# Patient Record
Sex: MALE | Race: BLACK OR AFRICAN AMERICAN | Employment: OTHER | URBAN - METROPOLITAN AREA
[De-identification: names, ages, dates, MRNs, and addresses within clinical notes are randomized per-mention and may not be internally consistent; named-entity substitution may affect disease eponyms.]

---

## 2017-01-10 ENCOUNTER — OFFICE VISIT (OUTPATIENT)
Dept: INTERNAL MEDICINE CLINIC | Age: 67
End: 2017-01-10

## 2017-01-10 VITALS
WEIGHT: 246.2 LBS | HEART RATE: 58 BPM | OXYGEN SATURATION: 98 % | BODY MASS INDEX: 33.39 KG/M2 | SYSTOLIC BLOOD PRESSURE: 112 MMHG | DIASTOLIC BLOOD PRESSURE: 86 MMHG

## 2017-01-10 DIAGNOSIS — Z23 NEED FOR DIPHTHERIA-TETANUS-PERTUSSIS (TDAP) VACCINE, ADULT/ADOLESCENT: ICD-10-CM

## 2017-01-10 DIAGNOSIS — I10 ESSENTIAL HYPERTENSION: Primary | ICD-10-CM

## 2017-01-10 DIAGNOSIS — E78.00 PURE HYPERCHOLESTEROLEMIA: ICD-10-CM

## 2017-01-10 PROCEDURE — G8484 FLU IMMUNIZE NO ADMIN: HCPCS | Performed by: INTERNAL MEDICINE

## 2017-01-10 PROCEDURE — G8427 DOCREV CUR MEDS BY ELIG CLIN: HCPCS | Performed by: INTERNAL MEDICINE

## 2017-01-10 PROCEDURE — 1036F TOBACCO NON-USER: CPT | Performed by: INTERNAL MEDICINE

## 2017-01-10 PROCEDURE — G8419 CALC BMI OUT NRM PARAM NOF/U: HCPCS | Performed by: INTERNAL MEDICINE

## 2017-01-10 PROCEDURE — 4040F PNEUMOC VAC/ADMIN/RCVD: CPT | Performed by: INTERNAL MEDICINE

## 2017-01-10 PROCEDURE — 1123F ACP DISCUSS/DSCN MKR DOCD: CPT | Performed by: INTERNAL MEDICINE

## 2017-01-10 PROCEDURE — 3017F COLORECTAL CA SCREEN DOC REV: CPT | Performed by: INTERNAL MEDICINE

## 2017-01-10 PROCEDURE — 99214 OFFICE O/P EST MOD 30 MIN: CPT | Performed by: INTERNAL MEDICINE

## 2017-01-15 ASSESSMENT — ENCOUNTER SYMPTOMS
EYES NEGATIVE: 1
GASTROINTESTINAL NEGATIVE: 1
RESPIRATORY NEGATIVE: 1

## 2017-04-19 ENCOUNTER — OFFICE VISIT (OUTPATIENT)
Dept: INTERNAL MEDICINE CLINIC | Age: 67
End: 2017-04-19

## 2017-04-19 VITALS
DIASTOLIC BLOOD PRESSURE: 82 MMHG | WEIGHT: 250 LBS | BODY MASS INDEX: 33.91 KG/M2 | HEART RATE: 56 BPM | OXYGEN SATURATION: 97 % | SYSTOLIC BLOOD PRESSURE: 130 MMHG

## 2017-04-19 DIAGNOSIS — I10 ESSENTIAL HYPERTENSION: Primary | ICD-10-CM

## 2017-04-19 DIAGNOSIS — E78.2 MIXED HYPERLIPIDEMIA: ICD-10-CM

## 2017-04-19 DIAGNOSIS — Z13.9 SCREENING: ICD-10-CM

## 2017-04-19 LAB
A/G RATIO: 1.2 (ref 1.1–2.2)
ALBUMIN SERPL-MCNC: 3.7 G/DL (ref 3.4–5)
ALP BLD-CCNC: 58 U/L (ref 40–129)
ALT SERPL-CCNC: 25 U/L (ref 10–40)
ANION GAP SERPL CALCULATED.3IONS-SCNC: 13 MMOL/L (ref 3–16)
AST SERPL-CCNC: 25 U/L (ref 15–37)
BILIRUB SERPL-MCNC: 0.4 MG/DL (ref 0–1)
BUN BLDV-MCNC: 19 MG/DL (ref 7–20)
CALCIUM SERPL-MCNC: 9 MG/DL (ref 8.3–10.6)
CHLORIDE BLD-SCNC: 100 MMOL/L (ref 99–110)
CHOLESTEROL, TOTAL: 158 MG/DL (ref 0–199)
CO2: 27 MMOL/L (ref 21–32)
CREAT SERPL-MCNC: 0.9 MG/DL (ref 0.8–1.3)
GFR AFRICAN AMERICAN: >60
GFR NON-AFRICAN AMERICAN: >60
GLOBULIN: 3.1 G/DL
GLUCOSE BLD-MCNC: 110 MG/DL (ref 70–99)
HCT VFR BLD CALC: 40.8 % (ref 40.5–52.5)
HDLC SERPL-MCNC: 67 MG/DL (ref 40–60)
HEMOGLOBIN: 13.3 G/DL (ref 13.5–17.5)
HEPATITIS C ANTIBODY INTERPRETATION: NORMAL
LDL CHOLESTEROL CALCULATED: 81 MG/DL
MCH RBC QN AUTO: 27.5 PG (ref 26–34)
MCHC RBC AUTO-ENTMCNC: 32.5 G/DL (ref 31–36)
MCV RBC AUTO: 84.9 FL (ref 80–100)
PDW BLD-RTO: 15.1 % (ref 12.4–15.4)
PLATELET # BLD: 194 K/UL (ref 135–450)
PMV BLD AUTO: 8 FL (ref 5–10.5)
POTASSIUM SERPL-SCNC: 3.6 MMOL/L (ref 3.5–5.1)
PROSTATE SPECIFIC ANTIGEN: 0.47 NG/ML (ref 0–4)
RBC # BLD: 4.81 M/UL (ref 4.2–5.9)
SODIUM BLD-SCNC: 140 MMOL/L (ref 136–145)
TOTAL PROTEIN: 6.8 G/DL (ref 6.4–8.2)
TRIGL SERPL-MCNC: 51 MG/DL (ref 0–150)
TSH REFLEX: 1.7 UIU/ML (ref 0.27–4.2)
VLDLC SERPL CALC-MCNC: 10 MG/DL
WBC # BLD: 4.4 K/UL (ref 4–11)

## 2017-04-19 PROCEDURE — 1036F TOBACCO NON-USER: CPT | Performed by: INTERNAL MEDICINE

## 2017-04-19 PROCEDURE — G8417 CALC BMI ABV UP PARAM F/U: HCPCS | Performed by: INTERNAL MEDICINE

## 2017-04-19 PROCEDURE — 99214 OFFICE O/P EST MOD 30 MIN: CPT | Performed by: INTERNAL MEDICINE

## 2017-04-19 PROCEDURE — 4040F PNEUMOC VAC/ADMIN/RCVD: CPT | Performed by: INTERNAL MEDICINE

## 2017-04-19 PROCEDURE — 3017F COLORECTAL CA SCREEN DOC REV: CPT | Performed by: INTERNAL MEDICINE

## 2017-04-19 PROCEDURE — 1123F ACP DISCUSS/DSCN MKR DOCD: CPT | Performed by: INTERNAL MEDICINE

## 2017-04-19 PROCEDURE — G8427 DOCREV CUR MEDS BY ELIG CLIN: HCPCS | Performed by: INTERNAL MEDICINE

## 2017-04-19 ASSESSMENT — PATIENT HEALTH QUESTIONNAIRE - PHQ9
2. FEELING DOWN, DEPRESSED OR HOPELESS: 0
SUM OF ALL RESPONSES TO PHQ9 QUESTIONS 1 & 2: 0
1. LITTLE INTEREST OR PLEASURE IN DOING THINGS: 0
SUM OF ALL RESPONSES TO PHQ QUESTIONS 1-9: 0

## 2017-04-20 ASSESSMENT — ENCOUNTER SYMPTOMS
RESPIRATORY NEGATIVE: 1
GASTROINTESTINAL NEGATIVE: 1
EYES NEGATIVE: 1

## 2017-05-01 RX ORDER — AMLODIPINE BESYLATE AND ATORVASTATIN CALCIUM 10; 10 MG/1; MG/1
TABLET, FILM COATED ORAL
Qty: 90 TABLET | Refills: 0 | Status: SHIPPED | OUTPATIENT
Start: 2017-05-01 | End: 2017-09-25 | Stop reason: SDUPTHER

## 2017-05-25 RX ORDER — POTASSIUM CHLORIDE 1500 MG/1
TABLET, FILM COATED, EXTENDED RELEASE ORAL
Qty: 60 TABLET | Refills: 0 | Status: SHIPPED | OUTPATIENT
Start: 2017-05-25 | End: 2017-06-27 | Stop reason: SDUPTHER

## 2017-05-25 RX ORDER — CANDESARTAN 32 MG/1
TABLET ORAL
Qty: 30 TABLET | Refills: 0 | Status: SHIPPED | OUTPATIENT
Start: 2017-05-25 | End: 2017-06-27 | Stop reason: SDUPTHER

## 2017-08-17 ENCOUNTER — OFFICE VISIT (OUTPATIENT)
Dept: INTERNAL MEDICINE CLINIC | Age: 67
End: 2017-08-17

## 2017-08-17 VITALS
HEART RATE: 58 BPM | HEIGHT: 72 IN | OXYGEN SATURATION: 97 % | DIASTOLIC BLOOD PRESSURE: 82 MMHG | SYSTOLIC BLOOD PRESSURE: 124 MMHG | WEIGHT: 248 LBS | BODY MASS INDEX: 33.59 KG/M2

## 2017-08-17 DIAGNOSIS — Z13.1 DIABETES MELLITUS SCREENING: ICD-10-CM

## 2017-08-17 DIAGNOSIS — E78.2 MIXED HYPERLIPIDEMIA: ICD-10-CM

## 2017-08-17 DIAGNOSIS — I10 ESSENTIAL HYPERTENSION: Primary | ICD-10-CM

## 2017-08-17 DIAGNOSIS — Z23 NEED FOR PROPHYLACTIC VACCINATION AGAINST STREPTOCOCCUS PNEUMONIAE (PNEUMOCOCCUS): ICD-10-CM

## 2017-08-17 PROCEDURE — 1123F ACP DISCUSS/DSCN MKR DOCD: CPT | Performed by: INTERNAL MEDICINE

## 2017-08-17 PROCEDURE — 4040F PNEUMOC VAC/ADMIN/RCVD: CPT | Performed by: INTERNAL MEDICINE

## 2017-08-17 PROCEDURE — 90670 PCV13 VACCINE IM: CPT | Performed by: INTERNAL MEDICINE

## 2017-08-17 PROCEDURE — 99214 OFFICE O/P EST MOD 30 MIN: CPT | Performed by: INTERNAL MEDICINE

## 2017-08-17 PROCEDURE — G0009 ADMIN PNEUMOCOCCAL VACCINE: HCPCS | Performed by: INTERNAL MEDICINE

## 2017-08-17 PROCEDURE — G8417 CALC BMI ABV UP PARAM F/U: HCPCS | Performed by: INTERNAL MEDICINE

## 2017-08-17 PROCEDURE — 3017F COLORECTAL CA SCREEN DOC REV: CPT | Performed by: INTERNAL MEDICINE

## 2017-08-17 PROCEDURE — G8427 DOCREV CUR MEDS BY ELIG CLIN: HCPCS | Performed by: INTERNAL MEDICINE

## 2017-08-17 PROCEDURE — 1036F TOBACCO NON-USER: CPT | Performed by: INTERNAL MEDICINE

## 2017-08-18 ASSESSMENT — ENCOUNTER SYMPTOMS
EYES NEGATIVE: 1
GASTROINTESTINAL NEGATIVE: 1
RESPIRATORY NEGATIVE: 1

## 2018-01-02 RX ORDER — POTASSIUM CHLORIDE 1500 MG/1
TABLET, FILM COATED, EXTENDED RELEASE ORAL
Qty: 60 TABLET | Refills: 0 | Status: SHIPPED | OUTPATIENT
Start: 2018-01-02 | End: 2018-02-01 | Stop reason: SDUPTHER

## 2018-01-02 RX ORDER — CANDESARTAN 32 MG/1
TABLET ORAL
Qty: 30 TABLET | Refills: 0 | Status: SHIPPED | OUTPATIENT
Start: 2018-01-02 | End: 2018-02-01 | Stop reason: SDUPTHER

## 2018-01-03 ENCOUNTER — OFFICE VISIT (OUTPATIENT)
Dept: INTERNAL MEDICINE CLINIC | Age: 68
End: 2018-01-03

## 2018-01-03 VITALS
WEIGHT: 246.8 LBS | SYSTOLIC BLOOD PRESSURE: 130 MMHG | HEIGHT: 72 IN | HEART RATE: 57 BPM | BODY MASS INDEX: 33.43 KG/M2 | OXYGEN SATURATION: 97 % | DIASTOLIC BLOOD PRESSURE: 86 MMHG

## 2018-01-03 DIAGNOSIS — I10 ESSENTIAL HYPERTENSION: ICD-10-CM

## 2018-01-03 DIAGNOSIS — M19.90 ARTHRITIS: ICD-10-CM

## 2018-01-03 DIAGNOSIS — E78.2 MIXED HYPERLIPIDEMIA: ICD-10-CM

## 2018-01-03 DIAGNOSIS — Z23 NEED FOR INFLUENZA VACCINATION: ICD-10-CM

## 2018-01-03 PROCEDURE — G8417 CALC BMI ABV UP PARAM F/U: HCPCS | Performed by: INTERNAL MEDICINE

## 2018-01-03 PROCEDURE — 1036F TOBACCO NON-USER: CPT | Performed by: INTERNAL MEDICINE

## 2018-01-03 PROCEDURE — G8484 FLU IMMUNIZE NO ADMIN: HCPCS | Performed by: INTERNAL MEDICINE

## 2018-01-03 PROCEDURE — 99214 OFFICE O/P EST MOD 30 MIN: CPT | Performed by: INTERNAL MEDICINE

## 2018-01-03 PROCEDURE — 3017F COLORECTAL CA SCREEN DOC REV: CPT | Performed by: INTERNAL MEDICINE

## 2018-01-03 PROCEDURE — G0008 ADMIN INFLUENZA VIRUS VAC: HCPCS | Performed by: INTERNAL MEDICINE

## 2018-01-03 PROCEDURE — 1123F ACP DISCUSS/DSCN MKR DOCD: CPT | Performed by: INTERNAL MEDICINE

## 2018-01-03 PROCEDURE — 4040F PNEUMOC VAC/ADMIN/RCVD: CPT | Performed by: INTERNAL MEDICINE

## 2018-01-03 PROCEDURE — G8427 DOCREV CUR MEDS BY ELIG CLIN: HCPCS | Performed by: INTERNAL MEDICINE

## 2018-01-03 PROCEDURE — 90662 IIV NO PRSV INCREASED AG IM: CPT | Performed by: INTERNAL MEDICINE

## 2018-01-03 NOTE — PROGRESS NOTES
Vaccine Information Sheet, \"Influenza - Inactivated\"  given to Omayra Webb, or parent/legal guardian of  Omayra Webb and verbalized understanding. Patient responses:    Have you ever had a reaction to a flu vaccine? No  Are you able to eat eggs without adverse effects? Yes  Do you have any current illness? No  Have you ever had [place] Syndrome? No    Flu vaccine given per order. Please see immunization tab.

## 2018-01-04 ASSESSMENT — ENCOUNTER SYMPTOMS
GASTROINTESTINAL NEGATIVE: 1
EYES NEGATIVE: 1
RESPIRATORY NEGATIVE: 1

## 2018-03-07 RX ORDER — FOSINOPRIL SODIUM 40 MG/1
TABLET ORAL
Qty: 90 TABLET | Refills: 1 | Status: SHIPPED | OUTPATIENT
Start: 2018-03-07 | End: 2018-09-06 | Stop reason: SDUPTHER

## 2018-03-07 RX ORDER — HYDROCHLOROTHIAZIDE 25 MG/1
TABLET ORAL
Qty: 90 TABLET | Refills: 1 | Status: SHIPPED | OUTPATIENT
Start: 2018-03-07 | End: 2018-09-06 | Stop reason: SDUPTHER

## 2018-03-07 RX ORDER — METOPROLOL TARTRATE 50 MG/1
TABLET, FILM COATED ORAL
Qty: 180 TABLET | Refills: 1 | Status: SHIPPED | OUTPATIENT
Start: 2018-03-07 | End: 2018-09-06 | Stop reason: SDUPTHER

## 2018-04-04 ENCOUNTER — OFFICE VISIT (OUTPATIENT)
Dept: INTERNAL MEDICINE CLINIC | Age: 68
End: 2018-04-04

## 2018-04-04 VITALS
OXYGEN SATURATION: 95 % | HEIGHT: 72 IN | DIASTOLIC BLOOD PRESSURE: 90 MMHG | HEART RATE: 55 BPM | SYSTOLIC BLOOD PRESSURE: 116 MMHG | BODY MASS INDEX: 33.26 KG/M2 | WEIGHT: 245.6 LBS

## 2018-04-04 DIAGNOSIS — M54.2 NECK PAIN: ICD-10-CM

## 2018-04-04 DIAGNOSIS — R73.9 HYPERGLYCEMIA: ICD-10-CM

## 2018-04-04 DIAGNOSIS — I10 ESSENTIAL HYPERTENSION: Primary | ICD-10-CM

## 2018-04-04 DIAGNOSIS — E78.2 MIXED HYPERLIPIDEMIA: ICD-10-CM

## 2018-04-04 LAB
ANION GAP SERPL CALCULATED.3IONS-SCNC: 11 MMOL/L (ref 3–16)
BUN BLDV-MCNC: 18 MG/DL (ref 7–20)
CALCIUM SERPL-MCNC: 9 MG/DL (ref 8.3–10.6)
CHLORIDE BLD-SCNC: 96 MMOL/L (ref 99–110)
CHOLESTEROL, TOTAL: 140 MG/DL (ref 0–199)
CO2: 31 MMOL/L (ref 21–32)
CREAT SERPL-MCNC: 1 MG/DL (ref 0.8–1.3)
CREATININE URINE: 110.9 MG/DL (ref 39–259)
GFR AFRICAN AMERICAN: >60
GFR NON-AFRICAN AMERICAN: >60
GLUCOSE BLD-MCNC: 110 MG/DL (ref 70–99)
HDLC SERPL-MCNC: 49 MG/DL (ref 40–60)
LDL CHOLESTEROL CALCULATED: 76 MG/DL
MICROALBUMIN UR-MCNC: <1.2 MG/DL
MICROALBUMIN/CREAT UR-RTO: NORMAL MG/G (ref 0–30)
POTASSIUM SERPL-SCNC: 3.7 MMOL/L (ref 3.5–5.1)
SODIUM BLD-SCNC: 138 MMOL/L (ref 136–145)
TRIGL SERPL-MCNC: 77 MG/DL (ref 0–150)
VLDLC SERPL CALC-MCNC: 15 MG/DL

## 2018-04-04 PROCEDURE — 1123F ACP DISCUSS/DSCN MKR DOCD: CPT | Performed by: INTERNAL MEDICINE

## 2018-04-04 PROCEDURE — G8417 CALC BMI ABV UP PARAM F/U: HCPCS | Performed by: INTERNAL MEDICINE

## 2018-04-04 PROCEDURE — 4040F PNEUMOC VAC/ADMIN/RCVD: CPT | Performed by: INTERNAL MEDICINE

## 2018-04-04 PROCEDURE — 3017F COLORECTAL CA SCREEN DOC REV: CPT | Performed by: INTERNAL MEDICINE

## 2018-04-04 PROCEDURE — 1036F TOBACCO NON-USER: CPT | Performed by: INTERNAL MEDICINE

## 2018-04-04 PROCEDURE — G8427 DOCREV CUR MEDS BY ELIG CLIN: HCPCS | Performed by: INTERNAL MEDICINE

## 2018-04-04 PROCEDURE — 99214 OFFICE O/P EST MOD 30 MIN: CPT | Performed by: INTERNAL MEDICINE

## 2018-04-05 LAB
ESTIMATED AVERAGE GLUCOSE: 128.4 MG/DL
HBA1C MFR BLD: 6.1 %

## 2018-04-05 ASSESSMENT — ENCOUNTER SYMPTOMS
GASTROINTESTINAL NEGATIVE: 1
RESPIRATORY NEGATIVE: 1

## 2018-08-06 RX ORDER — POTASSIUM CHLORIDE 1500 MG/1
TABLET, FILM COATED, EXTENDED RELEASE ORAL
Qty: 60 TABLET | Refills: 0 | Status: SHIPPED | OUTPATIENT
Start: 2018-08-06 | End: 2018-08-28 | Stop reason: SDUPTHER

## 2018-08-06 RX ORDER — CANDESARTAN 32 MG/1
TABLET ORAL
Qty: 30 TABLET | Refills: 0 | Status: SHIPPED | OUTPATIENT
Start: 2018-08-06 | End: 2018-09-04 | Stop reason: SDUPTHER

## 2018-08-28 RX ORDER — POTASSIUM CHLORIDE 1500 MG/1
TABLET, FILM COATED, EXTENDED RELEASE ORAL
Qty: 60 TABLET | Refills: 2 | Status: SHIPPED | OUTPATIENT
Start: 2018-08-28 | End: 2019-01-31 | Stop reason: SDUPTHER

## 2018-09-11 ENCOUNTER — OFFICE VISIT (OUTPATIENT)
Dept: INTERNAL MEDICINE CLINIC | Age: 68
End: 2018-09-11

## 2018-09-11 VITALS
WEIGHT: 250 LBS | HEART RATE: 80 BPM | DIASTOLIC BLOOD PRESSURE: 88 MMHG | BODY MASS INDEX: 33.86 KG/M2 | SYSTOLIC BLOOD PRESSURE: 136 MMHG | HEIGHT: 72 IN | OXYGEN SATURATION: 95 %

## 2018-09-11 DIAGNOSIS — I10 ESSENTIAL HYPERTENSION: ICD-10-CM

## 2018-09-11 DIAGNOSIS — Z23 NEED FOR PROPHYLACTIC VACCINATION AGAINST DIPHTHERIA-TETANUS-PERTUSSIS (DTP): ICD-10-CM

## 2018-09-11 DIAGNOSIS — K40.90 RIGHT INGUINAL HERNIA: ICD-10-CM

## 2018-09-11 DIAGNOSIS — E78.2 MIXED HYPERLIPIDEMIA: ICD-10-CM

## 2018-09-11 DIAGNOSIS — Z23 NEED FOR PROPHYLACTIC VACCINATION AND INOCULATION AGAINST VARICELLA: ICD-10-CM

## 2018-09-11 DIAGNOSIS — Z23 FLU VACCINE NEED: ICD-10-CM

## 2018-09-11 PROCEDURE — 4040F PNEUMOC VAC/ADMIN/RCVD: CPT | Performed by: INTERNAL MEDICINE

## 2018-09-11 PROCEDURE — G8427 DOCREV CUR MEDS BY ELIG CLIN: HCPCS | Performed by: INTERNAL MEDICINE

## 2018-09-11 PROCEDURE — 1101F PT FALLS ASSESS-DOCD LE1/YR: CPT | Performed by: INTERNAL MEDICINE

## 2018-09-11 PROCEDURE — 90662 IIV NO PRSV INCREASED AG IM: CPT | Performed by: INTERNAL MEDICINE

## 2018-09-11 PROCEDURE — G8417 CALC BMI ABV UP PARAM F/U: HCPCS | Performed by: INTERNAL MEDICINE

## 2018-09-11 PROCEDURE — 1123F ACP DISCUSS/DSCN MKR DOCD: CPT | Performed by: INTERNAL MEDICINE

## 2018-09-11 PROCEDURE — 3017F COLORECTAL CA SCREEN DOC REV: CPT | Performed by: INTERNAL MEDICINE

## 2018-09-11 PROCEDURE — G0008 ADMIN INFLUENZA VIRUS VAC: HCPCS | Performed by: INTERNAL MEDICINE

## 2018-09-11 PROCEDURE — 1036F TOBACCO NON-USER: CPT | Performed by: INTERNAL MEDICINE

## 2018-09-11 PROCEDURE — 99214 OFFICE O/P EST MOD 30 MIN: CPT | Performed by: INTERNAL MEDICINE

## 2018-09-11 ASSESSMENT — PATIENT HEALTH QUESTIONNAIRE - PHQ9
SUM OF ALL RESPONSES TO PHQ QUESTIONS 1-9: 0
SUM OF ALL RESPONSES TO PHQ QUESTIONS 1-9: 0
1. LITTLE INTEREST OR PLEASURE IN DOING THINGS: 0
SUM OF ALL RESPONSES TO PHQ9 QUESTIONS 1 & 2: 0
2. FEELING DOWN, DEPRESSED OR HOPELESS: 0

## 2018-09-12 ASSESSMENT — ENCOUNTER SYMPTOMS
RESPIRATORY NEGATIVE: 1
GASTROINTESTINAL NEGATIVE: 1
EYES NEGATIVE: 1

## 2018-09-24 ENCOUNTER — OFFICE VISIT (OUTPATIENT)
Dept: SURGERY | Age: 68
End: 2018-09-24
Payer: MEDICARE

## 2018-09-24 VITALS
DIASTOLIC BLOOD PRESSURE: 87 MMHG | WEIGHT: 250 LBS | SYSTOLIC BLOOD PRESSURE: 133 MMHG | HEIGHT: 72 IN | TEMPERATURE: 98.2 F | BODY MASS INDEX: 33.86 KG/M2

## 2018-09-24 DIAGNOSIS — K40.91 RECURRENT RIGHT INGUINAL HERNIA: Primary | ICD-10-CM

## 2018-09-24 PROCEDURE — 99203 OFFICE O/P NEW LOW 30 MIN: CPT | Performed by: SURGERY

## 2018-09-24 PROCEDURE — G8417 CALC BMI ABV UP PARAM F/U: HCPCS | Performed by: SURGERY

## 2018-09-24 PROCEDURE — 4040F PNEUMOC VAC/ADMIN/RCVD: CPT | Performed by: SURGERY

## 2018-09-24 PROCEDURE — 1123F ACP DISCUSS/DSCN MKR DOCD: CPT | Performed by: SURGERY

## 2018-09-24 PROCEDURE — 3017F COLORECTAL CA SCREEN DOC REV: CPT | Performed by: SURGERY

## 2018-09-24 PROCEDURE — 1036F TOBACCO NON-USER: CPT | Performed by: SURGERY

## 2018-09-24 PROCEDURE — 1101F PT FALLS ASSESS-DOCD LE1/YR: CPT | Performed by: SURGERY

## 2018-09-24 PROCEDURE — G8427 DOCREV CUR MEDS BY ELIG CLIN: HCPCS | Performed by: SURGERY

## 2018-09-24 NOTE — LETTER
[facility] - [facility] of [address] [phone]   [phone]                                                                                                      Arely Jain MD FACS                                        SURGERY ORDER   -- Time of order -- 18    5:01 PM    Facility:   [facility] # _________________                                  Scheduled by: ____________    Surgery Date & Time:                                                                              Pt arrival:      Patient Name:  Myrna Eubanks     :  1950 PCP:  Saul Webster MD    Home Ph:    [phone] (home)                                                     PROCEDURE:  Laparoscopic repair of recurrent right inguinal hernia 98871    DIAGNOSIS:      ICD-10-CM ICD-9-CM    1. Recurrent right inguinal hernia K40.91 550.91      Anesthesia: _General  Time Needed:  1 hour   Pt Position:  supine         Outpatient __x__ Admit ______  Assist._____  Pre-Op H & P to be done by: PCP      Labs Needed:   CBC ___  PT/PTT___ INR ____  CMP ___ EKG ___   Urine Hcg ___    Cardiac Clearance ___  (if Xd  to be done by) __________________    Patient to meet with Anesthesiology prior to surgery ___no__     Medications to be stopped 5 days before surgery: _________  Additional / Special Orders:     **Ancef 2 gm IV OCTOR   (If patient weight is > 120 kg, give 3 gm IV OCTOR)                     Initiate Flomax Study                                                                                         Arely Jain MD [facility]   Fax   [phone]            Date Of Procedure:    PATIENT:       Myrna Eubanks                    :  1950      No Known Allergies    No.   PHYSICIAN ORDERS   HUC/  RN      ORDERS WITH CHECK BOXES MUST BE SELECTED. ALL OTHER ORDERS WILL BE AUTOMATICALLY INITIATED.

## 2018-09-26 ENCOUNTER — TELEPHONE (OUTPATIENT)
Dept: SURGERY | Age: 68
End: 2018-09-26

## 2018-10-04 NOTE — PROGRESS NOTES
[facility] / [facility] ([facility]) [address] [facility], [address]    Acknowledgment of Informed Consent for Surgical or Medical Procedure and Sedation  I agree to allow doctor(s) KD GREEN and his/her associates or assistants, including residents and/or other qualified medical practitioner to perform the following medical treatment or procedure and to administer or direct the administration of sedation as necessary:  Procedure(s): [address] [facility]  My doctor has explained the following regarding the proposed procedure:   the explanation of the procedure   the benefits of the procedure   the potential problems that might occur during recuperation   the risks and side effects of the procedure which could include but are not limited to severe blood loss, infection, stroke or death   the benefits, risks and side effect of alternative procedures including the consequences of declining this procedure or any alternative procedures   the likelihood of achieving satisfactory results. I acknowledge no guarantee or assurance has been made to me regarding the results. I understand that during the course of this treatment/procedure, unforeseen conditions can occur which require an additional or different procedure. I agree to allow my physician or assistants to perform such extension of the original procedure as they may find necessary. I understand that sedation will often result in temporary impairment of memory and fine motor skills and that sedation can occasionally progress to a state of deep sedation or general anesthesia. I understand the risks of anesthesia for surgery include, but are not limited to, sore throat, hoarseness, injury to face, mouth, or teeth; nausea; headache; injury to blood vessels or nerves; death, brain damage, or paralysis.     I understand that if I have a Limitation of Treatment order in effect during my hospitalization, the order may or may not be in effect during this procedure. I give my doctor permission to give me blood or blood products. I understand that there are risks with receiving blood such as hepatitis, AIDS, fever, or allergic reaction. I acknowledge that the risks, benefits, and alternatives of this treatment have been explained to me and that no express or implied warranty has been given by the hospital, any blood bank, or any person or entity as to the blood or blood components transfused. At the discretion of my doctor, I agree to allow observers, equipment/product representatives and allow photographing, and/or televising of the procedure, provided my name or identity is maintained confidentially. I agree the hospital may dispose of or use for scientific or educational purposes any tissue, fluid, or body parts which may be removed.     ________________________________Date________Time______ am/pm  ([employer])  Patient or Signature of Closest Relative or Legal Guardian    ________________________________Date________Time______am/pm      Page 1 of  1  Witness

## 2018-10-08 ENCOUNTER — ANESTHESIA EVENT (OUTPATIENT)
Dept: OPERATING ROOM | Age: 68
End: 2018-10-08
Payer: MEDICARE

## 2018-10-09 ENCOUNTER — HOSPITAL ENCOUNTER (OUTPATIENT)
Age: 68
Setting detail: OUTPATIENT SURGERY
Discharge: HOME | End: 2018-10-09
Attending: SURGERY | Admitting: SURGERY
Payer: MEDICARE

## 2018-10-09 ENCOUNTER — ANESTHESIA (OUTPATIENT)
Dept: OPERATING ROOM | Age: 68
End: 2018-10-09
Payer: MEDICARE

## 2018-10-09 VITALS
RESPIRATION RATE: 16 BRPM | BODY MASS INDEX: 33.59 KG/M2 | HEIGHT: 72 IN | HEART RATE: 53 BPM | SYSTOLIC BLOOD PRESSURE: 115 MMHG | WEIGHT: 248 LBS | TEMPERATURE: 97.6 F | DIASTOLIC BLOOD PRESSURE: 66 MMHG | OXYGEN SATURATION: 92 %

## 2018-10-09 VITALS
OXYGEN SATURATION: 99 % | DIASTOLIC BLOOD PRESSURE: 79 MMHG | RESPIRATION RATE: 19 BRPM | TEMPERATURE: 96.6 F | SYSTOLIC BLOOD PRESSURE: 129 MMHG

## 2018-10-09 DIAGNOSIS — K40.90 RIGHT GROIN HERNIA: Primary | ICD-10-CM

## 2018-10-09 PROCEDURE — 2709999900 HC NON-CHARGEABLE SUPPLY: Performed by: SURGERY

## 2018-10-09 PROCEDURE — 7100000010 HC PHASE II RECOVERY - FIRST 15 MIN: Performed by: SURGERY

## 2018-10-09 PROCEDURE — 7100000000 HC PACU RECOVERY - FIRST 15 MIN: Performed by: SURGERY

## 2018-10-09 PROCEDURE — 6370000000 HC RX 637 (ALT 250 FOR IP): Performed by: ANESTHESIOLOGY

## 2018-10-09 PROCEDURE — 7100000001 HC PACU RECOVERY - ADDTL 15 MIN: Performed by: SURGERY

## 2018-10-09 PROCEDURE — 3700000000 HC ANESTHESIA ATTENDED CARE: Performed by: SURGERY

## 2018-10-09 PROCEDURE — 3700000001 HC ADD 15 MINUTES (ANESTHESIA): Performed by: SURGERY

## 2018-10-09 PROCEDURE — 3600000014 HC SURGERY LEVEL 4 ADDTL 15MIN: Performed by: SURGERY

## 2018-10-09 PROCEDURE — 2500000003 HC RX 250 WO HCPCS: Performed by: NURSE ANESTHETIST, CERTIFIED REGISTERED

## 2018-10-09 PROCEDURE — 49651 LAP ING HERNIA REPAIR RECUR: CPT | Performed by: SURGERY

## 2018-10-09 PROCEDURE — 2500000003 HC RX 250 WO HCPCS: Performed by: SURGERY

## 2018-10-09 PROCEDURE — 2580000003 HC RX 258: Performed by: SURGERY

## 2018-10-09 PROCEDURE — 7100000011 HC PHASE II RECOVERY - ADDTL 15 MIN: Performed by: SURGERY

## 2018-10-09 PROCEDURE — 2580000003 HC RX 258: Performed by: ANESTHESIOLOGY

## 2018-10-09 PROCEDURE — C1713 ANCHOR/SCREW BN/BN,TIS/BN: HCPCS | Performed by: SURGERY

## 2018-10-09 PROCEDURE — C1781 MESH (IMPLANTABLE): HCPCS | Performed by: SURGERY

## 2018-10-09 PROCEDURE — 6360000002 HC RX W HCPCS: Performed by: NURSE ANESTHETIST, CERTIFIED REGISTERED

## 2018-10-09 PROCEDURE — 3600000004 HC SURGERY LEVEL 4 BASE: Performed by: SURGERY

## 2018-10-09 DEVICE — MESH HERN L W10.8XL16CM R INGUINAL WHT POLYPR MFIL: Type: IMPLANTABLE DEVICE | Site: GROIN | Status: FUNCTIONAL

## 2018-10-09 RX ORDER — NEOSTIGMINE METHYLSULFATE 5 MG/5 ML
SYRINGE (ML) INTRAVENOUS PRN
Status: DISCONTINUED | OUTPATIENT
Start: 2018-10-09 | End: 2018-10-09 | Stop reason: SDUPTHER

## 2018-10-09 RX ORDER — GLYCOPYRROLATE 0.2 MG/ML
INJECTION INTRAMUSCULAR; INTRAVENOUS PRN
Status: DISCONTINUED | OUTPATIENT
Start: 2018-10-09 | End: 2018-10-09 | Stop reason: SDUPTHER

## 2018-10-09 RX ORDER — DEXAMETHASONE SODIUM PHOSPHATE 4 MG/ML
INJECTION, SOLUTION INTRA-ARTICULAR; INTRALESIONAL; INTRAMUSCULAR; INTRAVENOUS; SOFT TISSUE PRN
Status: DISCONTINUED | OUTPATIENT
Start: 2018-10-09 | End: 2018-10-09 | Stop reason: SDUPTHER

## 2018-10-09 RX ORDER — SODIUM CHLORIDE, SODIUM LACTATE, POTASSIUM CHLORIDE, CALCIUM CHLORIDE 600; 310; 30; 20 MG/100ML; MG/100ML; MG/100ML; MG/100ML
INJECTION, SOLUTION INTRAVENOUS CONTINUOUS
Status: DISCONTINUED | OUTPATIENT
Start: 2018-10-09 | End: 2018-10-09 | Stop reason: HOSPADM

## 2018-10-09 RX ORDER — ONDANSETRON 2 MG/ML
INJECTION INTRAMUSCULAR; INTRAVENOUS PRN
Status: DISCONTINUED | OUTPATIENT
Start: 2018-10-09 | End: 2018-10-09 | Stop reason: SDUPTHER

## 2018-10-09 RX ORDER — FENTANYL CITRATE 50 UG/ML
25 INJECTION, SOLUTION INTRAMUSCULAR; INTRAVENOUS EVERY 5 MIN PRN
Status: DISCONTINUED | OUTPATIENT
Start: 2018-10-09 | End: 2018-10-09 | Stop reason: HOSPADM

## 2018-10-09 RX ORDER — FENTANYL CITRATE 50 UG/ML
50 INJECTION, SOLUTION INTRAMUSCULAR; INTRAVENOUS EVERY 5 MIN PRN
Status: DISCONTINUED | OUTPATIENT
Start: 2018-10-09 | End: 2018-10-09 | Stop reason: HOSPADM

## 2018-10-09 RX ORDER — SODIUM CHLORIDE 0.9 % (FLUSH) 0.9 %
10 SYRINGE (ML) INJECTION EVERY 12 HOURS SCHEDULED
Status: DISCONTINUED | OUTPATIENT
Start: 2018-10-09 | End: 2018-10-09 | Stop reason: HOSPADM

## 2018-10-09 RX ORDER — FENTANYL CITRATE 50 UG/ML
INJECTION, SOLUTION INTRAMUSCULAR; INTRAVENOUS PRN
Status: DISCONTINUED | OUTPATIENT
Start: 2018-10-09 | End: 2018-10-09 | Stop reason: SDUPTHER

## 2018-10-09 RX ORDER — BUPIVACAINE HYDROCHLORIDE 5 MG/ML
INJECTION, SOLUTION EPIDURAL; INTRACAUDAL PRN
Status: DISCONTINUED | OUTPATIENT
Start: 2018-10-09 | End: 2018-10-09 | Stop reason: HOSPADM

## 2018-10-09 RX ORDER — PROPOFOL 10 MG/ML
INJECTION, EMULSION INTRAVENOUS PRN
Status: DISCONTINUED | OUTPATIENT
Start: 2018-10-09 | End: 2018-10-09 | Stop reason: SDUPTHER

## 2018-10-09 RX ORDER — PROMETHAZINE HYDROCHLORIDE 25 MG/ML
6.25 INJECTION, SOLUTION INTRAMUSCULAR; INTRAVENOUS
Status: DISCONTINUED | OUTPATIENT
Start: 2018-10-09 | End: 2018-10-09 | Stop reason: HOSPADM

## 2018-10-09 RX ORDER — LIDOCAINE HYDROCHLORIDE 10 MG/ML
1 INJECTION, SOLUTION EPIDURAL; INFILTRATION; INTRACAUDAL; PERINEURAL
Status: DISCONTINUED | OUTPATIENT
Start: 2018-10-09 | End: 2018-10-09 | Stop reason: HOSPADM

## 2018-10-09 RX ORDER — MAGNESIUM HYDROXIDE 1200 MG/15ML
LIQUID ORAL CONTINUOUS PRN
Status: DISCONTINUED | OUTPATIENT
Start: 2018-10-09 | End: 2018-10-09 | Stop reason: HOSPADM

## 2018-10-09 RX ORDER — HYDROMORPHONE HCL 110MG/55ML
PATIENT CONTROLLED ANALGESIA SYRINGE INTRAVENOUS PRN
Status: DISCONTINUED | OUTPATIENT
Start: 2018-10-09 | End: 2018-10-09 | Stop reason: SDUPTHER

## 2018-10-09 RX ORDER — ONDANSETRON 2 MG/ML
4 INJECTION INTRAMUSCULAR; INTRAVENOUS
Status: DISCONTINUED | OUTPATIENT
Start: 2018-10-09 | End: 2018-10-09 | Stop reason: HOSPADM

## 2018-10-09 RX ORDER — OXYCODONE HYDROCHLORIDE 5 MG/1
5 TABLET ORAL EVERY 6 HOURS PRN
Qty: 30 TABLET | Refills: 0 | Status: SHIPPED | OUTPATIENT
Start: 2018-10-09 | End: 2018-10-16

## 2018-10-09 RX ORDER — LIDOCAINE HYDROCHLORIDE 20 MG/ML
INJECTION, SOLUTION EPIDURAL; INFILTRATION; INTRACAUDAL; PERINEURAL PRN
Status: DISCONTINUED | OUTPATIENT
Start: 2018-10-09 | End: 2018-10-09 | Stop reason: SDUPTHER

## 2018-10-09 RX ORDER — SODIUM CHLORIDE 0.9 % (FLUSH) 0.9 %
10 SYRINGE (ML) INJECTION PRN
Status: DISCONTINUED | OUTPATIENT
Start: 2018-10-09 | End: 2018-10-09 | Stop reason: HOSPADM

## 2018-10-09 RX ORDER — ROCURONIUM BROMIDE 10 MG/ML
INJECTION, SOLUTION INTRAVENOUS PRN
Status: DISCONTINUED | OUTPATIENT
Start: 2018-10-09 | End: 2018-10-09 | Stop reason: SDUPTHER

## 2018-10-09 RX ADMIN — DEXAMETHASONE SODIUM PHOSPHATE 4 MG: 4 INJECTION, SOLUTION INTRAMUSCULAR; INTRAVENOUS at 10:50

## 2018-10-09 RX ADMIN — ROCURONIUM BROMIDE 50 MG: 10 INJECTION, SOLUTION INTRAVENOUS at 10:42

## 2018-10-09 RX ADMIN — METOPROLOL TARTRATE 50 MG: 25 TABLET ORAL at 09:36

## 2018-10-09 RX ADMIN — Medication 4 MG: at 11:40

## 2018-10-09 RX ADMIN — HYDROMORPHONE HYDROCHLORIDE 0.5 MG: 2 INJECTION, SOLUTION INTRAMUSCULAR; INTRAVENOUS; SUBCUTANEOUS at 11:52

## 2018-10-09 RX ADMIN — GLYCOPYRROLATE 0.6 MG: 0.2 INJECTION INTRAMUSCULAR; INTRAVENOUS at 11:40

## 2018-10-09 RX ADMIN — DEXAMETHASONE SODIUM PHOSPHATE 4 MG: 4 INJECTION, SOLUTION INTRAMUSCULAR; INTRAVENOUS at 11:40

## 2018-10-09 RX ADMIN — HYDROMORPHONE HYDROCHLORIDE 0.5 MG: 2 INJECTION, SOLUTION INTRAMUSCULAR; INTRAVENOUS; SUBCUTANEOUS at 11:55

## 2018-10-09 RX ADMIN — GLYCOPYRROLATE 0.2 MG: 0.2 INJECTION INTRAMUSCULAR; INTRAVENOUS at 10:42

## 2018-10-09 RX ADMIN — PROPOFOL 200 MG: 10 INJECTION, EMULSION INTRAVENOUS at 10:42

## 2018-10-09 RX ADMIN — SODIUM CHLORIDE, POTASSIUM CHLORIDE, SODIUM LACTATE AND CALCIUM CHLORIDE: 600; 310; 30; 20 INJECTION, SOLUTION INTRAVENOUS at 09:13

## 2018-10-09 RX ADMIN — ONDANSETRON 4 MG: 2 INJECTION INTRAMUSCULAR; INTRAVENOUS at 11:40

## 2018-10-09 RX ADMIN — LIDOCAINE HYDROCHLORIDE 100 MG: 20 INJECTION, SOLUTION EPIDURAL; INFILTRATION; INTRACAUDAL; PERINEURAL at 10:42

## 2018-10-09 RX ADMIN — HYDROMORPHONE HYDROCHLORIDE 0.5 MG: 2 INJECTION, SOLUTION INTRAMUSCULAR; INTRAVENOUS; SUBCUTANEOUS at 10:50

## 2018-10-09 RX ADMIN — FENTANYL CITRATE 100 MCG: 50 INJECTION INTRAMUSCULAR; INTRAVENOUS at 10:42

## 2018-10-09 ASSESSMENT — PULMONARY FUNCTION TESTS
PIF_VALUE: 22
PIF_VALUE: 17
PIF_VALUE: 9
PIF_VALUE: 24
PIF_VALUE: 24
PIF_VALUE: 3
PIF_VALUE: 19
PIF_VALUE: 21
PIF_VALUE: 19
PIF_VALUE: 22
PIF_VALUE: 22
PIF_VALUE: 7
PIF_VALUE: 17
PIF_VALUE: 1
PIF_VALUE: 19
PIF_VALUE: 4
PIF_VALUE: 23
PIF_VALUE: 21
PIF_VALUE: 23
PIF_VALUE: 23
PIF_VALUE: 2
PIF_VALUE: 22
PIF_VALUE: 19
PIF_VALUE: 22
PIF_VALUE: 21
PIF_VALUE: 23
PIF_VALUE: 3
PIF_VALUE: 1
PIF_VALUE: 21
PIF_VALUE: 22
PIF_VALUE: 22
PIF_VALUE: 16
PIF_VALUE: 23
PIF_VALUE: 23
PIF_VALUE: 1
PIF_VALUE: 24
PIF_VALUE: 0
PIF_VALUE: 23
PIF_VALUE: 17
PIF_VALUE: 1
PIF_VALUE: 22
PIF_VALUE: 24
PIF_VALUE: 22
PIF_VALUE: 21
PIF_VALUE: 23
PIF_VALUE: 3
PIF_VALUE: 22
PIF_VALUE: 19
PIF_VALUE: 18
PIF_VALUE: 4
PIF_VALUE: 21
PIF_VALUE: 24
PIF_VALUE: 23
PIF_VALUE: 18
PIF_VALUE: 23
PIF_VALUE: 13
PIF_VALUE: 1
PIF_VALUE: 23
PIF_VALUE: 22
PIF_VALUE: 4
PIF_VALUE: 19
PIF_VALUE: 21
PIF_VALUE: 21
PIF_VALUE: 18
PIF_VALUE: 23
PIF_VALUE: 23
PIF_VALUE: 21
PIF_VALUE: 24
PIF_VALUE: 1
PIF_VALUE: 23
PIF_VALUE: 24
PIF_VALUE: 19
PIF_VALUE: 1

## 2018-10-09 ASSESSMENT — PAIN SCALES - GENERAL
PAINLEVEL_OUTOF10: 0
PAINLEVEL_OUTOF10: 0

## 2018-10-09 ASSESSMENT — LIFESTYLE VARIABLES: SMOKING_STATUS: 0

## 2018-10-09 ASSESSMENT — PAIN - FUNCTIONAL ASSESSMENT: PAIN_FUNCTIONAL_ASSESSMENT: 0-10

## 2018-10-09 NOTE — PROGRESS NOTES
Ambulated to  with assistance , gait steady. Did not void. Drinking juice, no c/o nausea. IV saline locked.

## 2018-10-09 NOTE — ANESTHESIA POSTPROCEDURE EVALUATION
Department of Anesthesiology  Postprocedure Note    Patient: Anam James  MRN: [id number]  YOB: 1950  Date of evaluation: 10/9/2018  Time:  1:32 PM     Procedure Summary     Date:  10/09/18 Room / Location:  [facility] [address] / [facility] OR    Anesthesia Start:  0377 Anesthesia Stop:  2688    Procedure:  LAPAROSCOPIC REPAIR OF RECURRENT RIGHT INGUINAL HERNIA (N/A ) Diagnosis:  (RECURRENT RIGHT  INGUINAL HERNIA )    Surgeon:  Zahida Leonardo MD Responsible Provider:  Wilfredo Tierney MD    Anesthesia Type:  general ASA Status:  3          Anesthesia Type: general    Jaskaran Phase I: Jaskaran Score: 8    Jaskaran Phase II: Jaskaran Score: 10    Last vitals: Reviewed and per EMR flowsheets.        Anesthesia Post Evaluation    Patient location during evaluation: PACU  Patient participation: complete - patient participated  Level of consciousness: awake and alert  Airway patency: patent  Nausea & Vomiting: no nausea and no vomiting  Complications: no  Cardiovascular status: blood pressure returned to baseline  Respiratory status: acceptable  Hydration status: stable

## 2018-10-09 NOTE — H&P
DAY 9/26/17  Yes Josette Luong MD   fosinopril (MONOPRIL) 40 MG tablet TAKE 1 TABLET BY MOUTH EVERY DAY FOR BLOOD PRESSURE 6/1/15  Yes Josette Luong MD   zoster recombinant adjuvanted vaccine [facility]) 50 MCG SUSR injection 50 MCG IM then repeat 2-6 months. 9/11/18 9/11/19  Josette Luong MD   fosinopril (MONOPRIL) 40 MG tablet TAKE 1 TABLET BY MOUTH DAILY FOR BLOOD PRESSURE 9/6/18   Josette Luong MD   tadalafil (CIALIS) 20 MG tablet Take 1 tablet by mouth as needed for Erectile Dysfunction 10/4/16 11/3/16  Josette Luong MD   tadalafil (CIALIS) 20 MG tablet Take 1 tablet by mouth as needed 9/22/15 10/22/15  Josette Luong MD   aspirin 81 MG EC tablet Take 81 mg by mouth every 48 hours as needed. 5/27/10   Historical Provider, MD         Allergies:  Patient has no known allergies. PHYSICAL EXAM:      BP (!) 165/93   Pulse 62   Temp 97.5 °F (36.4 °C) (Oral)   Resp 16   Ht 6' (1.829 m)   Wt 248 lb (112.5 kg)   SpO2 97%   BMI 33.63 kg/m²      Heart:  regular rate and rhythm, no murmur    Lungs:  No increased work of breathing, good air exchange, clear to auscultation bilaterally, no crackles or wheezing    Abdomen:  Obese, soft, non-distended, non-tender, no rebound tenderness or guarding, normal active bowel sounds and no masses palpated    ASSESSMENT AND PLAN:    1. Patient seen and focused exam done today- no new changes since last physical exam on 9-    2. Access to ancillary services are available per request of the provider.     Lilibeth Pena     10/9/2018

## 2018-10-09 NOTE — BRIEF OP NOTE
Brief Postoperative Note  ______________________________________________________________    Patient: Andrew Rivera  YOB: 1950  MRN: [id number]  Date of Procedure: 10/9/2018    Pre-Op Diagnosis: RECURRENT RIGHT  INGUINAL HERNIA     Post-Op Diagnosis: Same       Procedure(s):  LAPAROSCOPIC REPAIR OF RECURRENT RIGHT INGUINAL HERNIA    Anesthesia: General    Surgeon(s):  Denver December, MD    Staff:  Relief Scrub: Eddie Wilder RN  Scrub Person First: Shweta Rodriguez RN   Resident: [facility]. [place] PGY 3     Estimated Blood Loss: <22 cc    Complications: None    Specimens:  None     Implants:  Large Right Bard 3D Mesh         Drains:  None     Findings: Recurrent right inguinal hernia.   Direct hernia     Lexi Hu MD  Date: 10/9/2018  Time: 11:41 AM   [phone]

## 2018-10-09 NOTE — ANESTHESIA PRE PROCEDURE
10/09/18 248 lb (112.5 kg)   09/24/18 250 lb (113.4 kg)   09/11/18 250 lb (113.4 kg)     Body mass index is 33.63 kg/m². CBC:   Lab Results   Component Value Date    WBC 4.4 04/19/2017    RBC 4.81 04/19/2017    HGB 13.3 04/19/2017    HCT 40.8 04/19/2017    MCV 84.9 04/19/2017    RDW 15.1 04/19/2017     04/19/2017       CMP:   Lab Results   Component Value Date     04/04/2018    K 3.7 04/04/2018    CL 96 04/04/2018    CO2 31 04/04/2018    BUN 18 04/04/2018    CREATININE 1.0 04/04/2018    GFRAA >60 04/04/2018    GFRAA >60 03/13/2013    AGRATIO 1.2 04/19/2017    LABGLOM >60 04/04/2018    GLUCOSE 110 04/04/2018    PROT 6.8 04/19/2017    PROT 7.2 03/13/2013    CALCIUM 9.0 04/04/2018    BILITOT 0.4 04/19/2017    ALKPHOS 58 04/19/2017    AST 25 04/19/2017    ALT 25 04/19/2017       POC Tests: No results for input(s): POCGLU, POCNA, POCK, POCCL, POCBUN, POCHEMO, POCHCT in the last 72 hours. Coags: No results found for: PROTIME, INR, APTT    HCG (If Applicable): No results found for: PREGTESTUR, PREGSERUM, HCG, HCGQUANT     ABGs: No results found for: PHART, PO2ART, IEW9HSV, XMD5CCF, BEART, L7MNRARU     Type & Screen (If Applicable):  No results found for: LABABO, [address]    Anesthesia Evaluation  Patient summary reviewed and Nursing notes reviewed no history of anesthetic complications:   Airway: Mallampati: II  TM distance: >3 FB   Neck ROM: full  Mouth opening: > = 3 FB Dental: normal exam         Pulmonary:Negative Pulmonary ROS and normal exam  breath sounds clear to auscultation      (-) not a current smoker                           Cardiovascular:    (+) hypertension: moderate, hyperlipidemia        Rhythm: regular  Rate: normal           Beta Blocker:  Dose within 24 Hrs         Neuro/Psych:   Negative Neuro/Psych ROS              GI/Hepatic/Renal:        (-) hiatal hernia and GERD      ROS comment: Obese  .    Endo/Other: Negative Endo/Other ROS                     ROS comment: Vitamin D

## 2018-10-16 RX ORDER — AMLODIPINE BESYLATE AND ATORVASTATIN CALCIUM 10; 10 MG/1; MG/1
TABLET, FILM COATED ORAL
Qty: 90 TABLET | Refills: 0 | Status: SHIPPED | OUTPATIENT
Start: 2018-10-16 | End: 2019-01-14 | Stop reason: SDUPTHER

## 2018-10-18 ENCOUNTER — OFFICE VISIT (OUTPATIENT)
Dept: SURGERY | Age: 68
End: 2018-10-18

## 2018-10-18 VITALS
HEART RATE: 55 BPM | DIASTOLIC BLOOD PRESSURE: 77 MMHG | WEIGHT: 243 LBS | BODY MASS INDEX: 32.91 KG/M2 | SYSTOLIC BLOOD PRESSURE: 127 MMHG | HEIGHT: 72 IN

## 2018-10-18 DIAGNOSIS — Z87.19 S/P LAPAROSCOPIC HERNIA REPAIR: Primary | ICD-10-CM

## 2018-10-18 DIAGNOSIS — Z98.890 S/P LAPAROSCOPIC HERNIA REPAIR: Primary | ICD-10-CM

## 2018-10-18 PROCEDURE — 99024 POSTOP FOLLOW-UP VISIT: CPT | Performed by: SURGERY

## 2018-10-29 NOTE — PROGRESS NOTES
PATIENT NAME: Dejon eBnson     YOB: 1950     TODAY'S DATE: 10/29/2018    Reason for Visit:  Post-op check     Requesting Physician:  Dr. Deshpande Mu:              The patient is a 77 y.o. male who presents for follow up without complaints. Chief Complaint   Patient presents with    Post-Op Check     Lap RIH repair 10/9/18       REVIEW OF SYSTEMS:  CONSTITUTIONAL:  negative  HEENT:  negative  RESPIRATORY:  negative  CARDIOVASCULAR:  negative  GASTROINTESTINAL:  negative   GENITOURINARY:  negative  HEMATOLOGIC/LYMPHATIC:  negative  NEUROLOGICAL:  negative    PHYSICAL EXAM:  VITALS:  /77 (Site: Left Upper Arm, Position: Sitting, Cuff Size: Medium Adult)   Pulse 55   Ht 6' (1.829 m)   Wt 243 lb (110.2 kg)   BMI 32.96 kg/m²     CONSTITUTIONAL:  alert, no apparent distress and mildly obese  EYES:  sclera clear  ENT:  normocepalic, without obvious abnormality  NECK:  supple, symmetrical, trachea midline and no carotid bruits  LUNGS:  clear to auscultation  CARDIOVASCULAR:  regular rate and rhythm and no murmur noted  ABDOMEN:  Incisions are healing well, normal bowel sounds, soft, non-distended, non-tender, voluntary guarding absent, no masses palpated  MUSCULOSKELETAL:  0+ pitting edema lower extremities  NEUROLOGIC:  Mental Status Exam:  Level of Alertness:   awake  Orientation:   person, place, time  SKIN:  no bruising or bleeding and normal skin color, texture, turgor    IMPRESSION/RECOMMENDATIONS:    Patient is s/p laparoscopic recurrent right inguinal hernia repair. He is recovering well from surgery. His pain is minimal and he is returning to normal activities. We discussed his continued restrictions and I will see him back in the office on a prn basis.      Molina Shipley

## 2018-12-12 ENCOUNTER — OFFICE VISIT (OUTPATIENT)
Dept: INTERNAL MEDICINE CLINIC | Age: 68
End: 2018-12-12
Payer: MEDICARE

## 2018-12-12 VITALS
HEIGHT: 72 IN | DIASTOLIC BLOOD PRESSURE: 80 MMHG | HEART RATE: 52 BPM | SYSTOLIC BLOOD PRESSURE: 128 MMHG | OXYGEN SATURATION: 94 % | BODY MASS INDEX: 33.46 KG/M2 | WEIGHT: 247 LBS

## 2018-12-12 DIAGNOSIS — I10 ESSENTIAL HYPERTENSION: Primary | ICD-10-CM

## 2018-12-12 DIAGNOSIS — E78.2 MIXED HYPERLIPIDEMIA: ICD-10-CM

## 2018-12-12 PROCEDURE — G8427 DOCREV CUR MEDS BY ELIG CLIN: HCPCS | Performed by: INTERNAL MEDICINE

## 2018-12-12 PROCEDURE — 1123F ACP DISCUSS/DSCN MKR DOCD: CPT | Performed by: INTERNAL MEDICINE

## 2018-12-12 PROCEDURE — G8417 CALC BMI ABV UP PARAM F/U: HCPCS | Performed by: INTERNAL MEDICINE

## 2018-12-12 PROCEDURE — 3017F COLORECTAL CA SCREEN DOC REV: CPT | Performed by: INTERNAL MEDICINE

## 2018-12-12 PROCEDURE — 4040F PNEUMOC VAC/ADMIN/RCVD: CPT | Performed by: INTERNAL MEDICINE

## 2018-12-12 PROCEDURE — G8482 FLU IMMUNIZE ORDER/ADMIN: HCPCS | Performed by: INTERNAL MEDICINE

## 2018-12-12 PROCEDURE — 99214 OFFICE O/P EST MOD 30 MIN: CPT | Performed by: INTERNAL MEDICINE

## 2018-12-12 PROCEDURE — 1101F PT FALLS ASSESS-DOCD LE1/YR: CPT | Performed by: INTERNAL MEDICINE

## 2018-12-12 PROCEDURE — 4004F PT TOBACCO SCREEN RCVD TLK: CPT | Performed by: INTERNAL MEDICINE

## 2018-12-12 ASSESSMENT — PATIENT HEALTH QUESTIONNAIRE - PHQ9
SUM OF ALL RESPONSES TO PHQ QUESTIONS 1-9: 0
2. FEELING DOWN, DEPRESSED OR HOPELESS: 0
1. LITTLE INTEREST OR PLEASURE IN DOING THINGS: 0
SUM OF ALL RESPONSES TO PHQ9 QUESTIONS 1 & 2: 0
SUM OF ALL RESPONSES TO PHQ QUESTIONS 1-9: 0

## 2018-12-15 ASSESSMENT — ENCOUNTER SYMPTOMS
EYES NEGATIVE: 1
RESPIRATORY NEGATIVE: 1
GASTROINTESTINAL NEGATIVE: 1

## 2018-12-26 RX ORDER — POTASSIUM CHLORIDE 1500 MG/1
TABLET, FILM COATED, EXTENDED RELEASE ORAL
Qty: 60 TABLET | Refills: 0 | Status: SHIPPED | OUTPATIENT
Start: 2018-12-26 | End: 2019-01-27 | Stop reason: SDUPTHER

## 2019-01-14 RX ORDER — AMLODIPINE BESYLATE AND ATORVASTATIN CALCIUM 10; 10 MG/1; MG/1
TABLET, FILM COATED ORAL
Qty: 90 TABLET | Refills: 0 | Status: SHIPPED | OUTPATIENT
Start: 2019-01-14 | End: 2019-04-13 | Stop reason: SDUPTHER

## 2019-01-15 NOTE — PATIENT INSTRUCTIONS
CARDIOVASCULAR - ADULT     Maintains optimal cardiac output and hemodynamic stability Adequate for Discharge     Absence of cardiac dysrhythmias or at baseline rhythm Adequate for Discharge        DISCHARGE PLANNING     Discharge to home or other facility with appropriate resources Adequate for Discharge        DISCHARGE PLANNING - CARE MANAGEMENT     Discharge to post-acute care or home with appropriate resources Adequate for Discharge        INFECTION - ADULT     Absence or prevention of progression during hospitalization Adequate for Discharge     Absence of fever/infection during neutropenic period Adequate for Discharge        Knowledge Deficit     Patient/family/caregiver demonstrates understanding of disease process, treatment plan, medications, and discharge instructions Adequate for Discharge        PAIN - ADULT     Verbalizes/displays adequate comfort level or baseline comfort level Adequate for Discharge        Potential for Falls     Patient will remain free of falls Adequate for Discharge        SAFETY ADULT     Maintain or return to baseline ADL function Adequate for Discharge     Maintain or return mobility status to optimal level Adequate for Discharge     Patient will remain free of falls Adequate for Discharge Sera Dietirch has a pain level on 0/10 scale  0    Location right groin pain with walking, or daily activities

## 2019-01-31 RX ORDER — POTASSIUM CHLORIDE 20 MEQ/1
TABLET, EXTENDED RELEASE ORAL
Qty: 60 TABLET | Refills: 0 | Status: SHIPPED | OUTPATIENT
Start: 2019-01-31 | End: 2019-03-12

## 2019-01-31 RX ORDER — POTASSIUM CHLORIDE 1500 MG/1
TABLET, FILM COATED, EXTENDED RELEASE ORAL
Qty: 60 TABLET | Refills: 0 | Status: CANCELLED | OUTPATIENT
Start: 2019-01-31

## 2019-01-31 RX ORDER — POTASSIUM CHLORIDE 1500 MG/1
TABLET, FILM COATED, EXTENDED RELEASE ORAL
Qty: 60 TABLET | Refills: 2 | Status: SHIPPED | OUTPATIENT
Start: 2019-01-31 | End: 2021-06-02

## 2019-03-05 RX ORDER — HYDROCHLOROTHIAZIDE 25 MG/1
TABLET ORAL
Qty: 90 TABLET | Refills: 0 | Status: SHIPPED | OUTPATIENT
Start: 2019-03-05 | End: 2019-06-02 | Stop reason: SDUPTHER

## 2019-03-05 RX ORDER — FOSINOPRIL SODIUM 40 MG/1
TABLET ORAL
Qty: 90 TABLET | Refills: 0 | Status: SHIPPED | OUTPATIENT
Start: 2019-03-05 | End: 2019-06-02 | Stop reason: SDUPTHER

## 2019-03-05 RX ORDER — METOPROLOL TARTRATE 50 MG/1
TABLET, FILM COATED ORAL
Qty: 180 TABLET | Refills: 0 | Status: SHIPPED | OUTPATIENT
Start: 2019-03-05 | End: 2019-06-02 | Stop reason: SDUPTHER

## 2019-03-12 ENCOUNTER — OFFICE VISIT (OUTPATIENT)
Dept: INTERNAL MEDICINE CLINIC | Age: 69
End: 2019-03-12
Payer: MEDICARE

## 2019-03-12 VITALS
SYSTOLIC BLOOD PRESSURE: 136 MMHG | WEIGHT: 251 LBS | OXYGEN SATURATION: 96 % | BODY MASS INDEX: 34 KG/M2 | HEART RATE: 62 BPM | HEIGHT: 72 IN | DIASTOLIC BLOOD PRESSURE: 82 MMHG

## 2019-03-12 DIAGNOSIS — R73.02 IGT (IMPAIRED GLUCOSE TOLERANCE): ICD-10-CM

## 2019-03-12 DIAGNOSIS — E78.2 MIXED HYPERLIPIDEMIA: ICD-10-CM

## 2019-03-12 DIAGNOSIS — M67.442 GANGLION CYST OF FLEXOR TENDON SHEATH OF FINGER OF LEFT HAND: ICD-10-CM

## 2019-03-12 DIAGNOSIS — I10 ESSENTIAL HYPERTENSION: Primary | ICD-10-CM

## 2019-03-12 DIAGNOSIS — I10 ESSENTIAL HYPERTENSION: ICD-10-CM

## 2019-03-12 LAB
ANION GAP SERPL CALCULATED.3IONS-SCNC: 15 MMOL/L (ref 3–16)
BUN BLDV-MCNC: 24 MG/DL (ref 7–20)
CALCIUM SERPL-MCNC: 9.4 MG/DL (ref 8.3–10.6)
CHLORIDE BLD-SCNC: 104 MMOL/L (ref 99–110)
CHOLESTEROL, TOTAL: 142 MG/DL (ref 0–199)
CO2: 25 MMOL/L (ref 21–32)
CREAT SERPL-MCNC: 1.2 MG/DL (ref 0.8–1.3)
CREATININE URINE: 212.7 MG/DL (ref 39–259)
GFR AFRICAN AMERICAN: >60
GFR NON-AFRICAN AMERICAN: 60
GLUCOSE BLD-MCNC: 134 MG/DL (ref 70–99)
HDLC SERPL-MCNC: 62 MG/DL (ref 40–60)
LDL CHOLESTEROL CALCULATED: 65 MG/DL
MICROALBUMIN UR-MCNC: 4.3 MG/DL
MICROALBUMIN/CREAT UR-RTO: 20.2 MG/G (ref 0–30)
POTASSIUM SERPL-SCNC: 3.9 MMOL/L (ref 3.5–5.1)
SODIUM BLD-SCNC: 144 MMOL/L (ref 136–145)
TRIGL SERPL-MCNC: 76 MG/DL (ref 0–150)
VLDLC SERPL CALC-MCNC: 15 MG/DL

## 2019-03-12 PROCEDURE — G8482 FLU IMMUNIZE ORDER/ADMIN: HCPCS | Performed by: INTERNAL MEDICINE

## 2019-03-12 PROCEDURE — 1123F ACP DISCUSS/DSCN MKR DOCD: CPT | Performed by: INTERNAL MEDICINE

## 2019-03-12 PROCEDURE — 4004F PT TOBACCO SCREEN RCVD TLK: CPT | Performed by: INTERNAL MEDICINE

## 2019-03-12 PROCEDURE — 4040F PNEUMOC VAC/ADMIN/RCVD: CPT | Performed by: INTERNAL MEDICINE

## 2019-03-12 PROCEDURE — G8417 CALC BMI ABV UP PARAM F/U: HCPCS | Performed by: INTERNAL MEDICINE

## 2019-03-12 PROCEDURE — 99214 OFFICE O/P EST MOD 30 MIN: CPT | Performed by: INTERNAL MEDICINE

## 2019-03-12 PROCEDURE — G8427 DOCREV CUR MEDS BY ELIG CLIN: HCPCS | Performed by: INTERNAL MEDICINE

## 2019-03-12 PROCEDURE — 1101F PT FALLS ASSESS-DOCD LE1/YR: CPT | Performed by: INTERNAL MEDICINE

## 2019-03-12 PROCEDURE — 3017F COLORECTAL CA SCREEN DOC REV: CPT | Performed by: INTERNAL MEDICINE

## 2019-03-12 ASSESSMENT — PATIENT HEALTH QUESTIONNAIRE - PHQ9
SUM OF ALL RESPONSES TO PHQ QUESTIONS 1-9: 0
SUM OF ALL RESPONSES TO PHQ9 QUESTIONS 1 & 2: 0
1. LITTLE INTEREST OR PLEASURE IN DOING THINGS: 0
2. FEELING DOWN, DEPRESSED OR HOPELESS: 0
SUM OF ALL RESPONSES TO PHQ QUESTIONS 1-9: 0

## 2019-03-13 LAB
ESTIMATED AVERAGE GLUCOSE: 128.4 MG/DL
HBA1C MFR BLD: 6.1 %

## 2019-05-28 RX ORDER — POTASSIUM CHLORIDE 20 MEQ/1
TABLET, EXTENDED RELEASE ORAL
Qty: 60 TABLET | Refills: 0 | Status: SHIPPED | OUTPATIENT
Start: 2019-05-28 | End: 2019-05-28 | Stop reason: SDUPTHER

## 2019-06-12 ENCOUNTER — OFFICE VISIT (OUTPATIENT)
Dept: INTERNAL MEDICINE CLINIC | Age: 69
End: 2019-06-12
Payer: MEDICARE

## 2019-06-12 VITALS
OXYGEN SATURATION: 94 % | HEART RATE: 55 BPM | BODY MASS INDEX: 34.58 KG/M2 | HEIGHT: 71 IN | DIASTOLIC BLOOD PRESSURE: 86 MMHG | WEIGHT: 247 LBS | SYSTOLIC BLOOD PRESSURE: 132 MMHG

## 2019-06-12 DIAGNOSIS — I10 ESSENTIAL HYPERTENSION: Primary | ICD-10-CM

## 2019-06-12 DIAGNOSIS — E78.00 PURE HYPERCHOLESTEROLEMIA: ICD-10-CM

## 2019-06-12 PROCEDURE — 1123F ACP DISCUSS/DSCN MKR DOCD: CPT | Performed by: INTERNAL MEDICINE

## 2019-06-12 PROCEDURE — G8417 CALC BMI ABV UP PARAM F/U: HCPCS | Performed by: INTERNAL MEDICINE

## 2019-06-12 PROCEDURE — 99214 OFFICE O/P EST MOD 30 MIN: CPT | Performed by: INTERNAL MEDICINE

## 2019-06-12 PROCEDURE — 4040F PNEUMOC VAC/ADMIN/RCVD: CPT | Performed by: INTERNAL MEDICINE

## 2019-06-12 PROCEDURE — G8427 DOCREV CUR MEDS BY ELIG CLIN: HCPCS | Performed by: INTERNAL MEDICINE

## 2019-06-12 PROCEDURE — 3017F COLORECTAL CA SCREEN DOC REV: CPT | Performed by: INTERNAL MEDICINE

## 2019-06-12 PROCEDURE — 4004F PT TOBACCO SCREEN RCVD TLK: CPT | Performed by: INTERNAL MEDICINE

## 2019-06-23 NOTE — PROGRESS NOTES
Subjective:      Patient ID: Tata Stoll is a 77 y.o. male. HPI He is here for a check up and f/u on hypertension, hyperlipidemia. He is taking medication as prescribed and continues to work on a more balanced meal plan and more physical activity. Review of Systems   Constitutional:        Elevated BMI. 33.5. See HPI. HENT: Negative. Eyes: Negative. Respiratory: Negative. Cardiovascular:        HTN, on stable med regimen. See list.   Gastrointestinal: Negative. Endocrine:        Hyperlipidemia, treated with statin. Genitourinary: Negative. Musculoskeletal: Negative. Skin: Negative. Allergic/Immunologic:        Received shingles and Tdap [facility] at [facility]. Neurological: Negative. Psychiatric/Behavioral: Negative. Objective:   Physical Exam   Constitutional: He is oriented to person, place, and time. He appears well-developed and well-nourished. No distress. HENT:   Head: Normocephalic and atraumatic. Right Ear: External ear normal.   Left Ear: External ear normal.   Nose: Nose normal.   Mouth/Throat: Oropharynx is clear and moist.   Eyes: Pupils are equal, round, and reactive to light. Conjunctivae and EOM are normal. No scleral icterus. Neck: Normal range of motion. Neck supple. No thyromegaly present. Cardiovascular: Normal rate, regular rhythm, normal heart sounds and intact distal pulses. Pulmonary/Chest: Effort normal and breath sounds normal.   Abdominal: Soft. Bowel sounds are normal. He exhibits no mass. Lymphadenopathy:     He has no cervical adenopathy. POPPY: left flexor surface radial aspect ganglion. Neurological: He is alert and oriented to person, place, and time. He has normal reflexes. Skin: Skin is warm and dry. Psychiatric: He has a normal mood and affect. His behavior is normal. Judgment and thought content normal.       Assessment:        Diagnosis Orders   1. Essential hypertension  Continue same med regimen.    DASH diet discussed. 2. BMI 33.0-33.9,adult  Lower calories and regular physical activity discussed. 3. Mixed hyperlipidemia  Heart healthy diet and statin compliance discussed. 4.      Left wrist ganglion: discussed finding. Ortho f/u. Plan:    See plans above.

## 2019-06-26 ENCOUNTER — TELEPHONE (OUTPATIENT)
Dept: INTERNAL MEDICINE CLINIC | Age: 69
End: 2019-06-26

## 2019-06-26 NOTE — TELEPHONE ENCOUNTER
Pharmacy called due to need the strength on tadalafil (CIALIS) 20 MG tablet. Pharmacy stated that the prescription says 21 MG please call back [phone] the pt order number [id number].  Please Advise

## 2019-06-28 RX ORDER — TADALAFIL 20 MG/1
20 TABLET ORAL DAILY PRN
Qty: 30 TABLET | Refills: 1 | Status: SHIPPED | OUTPATIENT
Start: 2019-06-28 | End: 2019-07-31 | Stop reason: SDUPTHER

## 2019-07-02 ENCOUNTER — TELEPHONE (OUTPATIENT)
Dept: INTERNAL MEDICINE CLINIC | Age: 69
End: 2019-07-02

## 2019-07-02 NOTE — TELEPHONE ENCOUNTER
Pharmacy called due to need the strength on tadalafil (CIALIS) 20 MG tablet. Pharmacy stated that the prescription says 21 MG please call back [phone] the pt order number [id number].  Please Advise

## 2019-08-01 RX ORDER — TADALAFIL 20 MG/1
20 TABLET ORAL DAILY PRN
Qty: 30 TABLET | Refills: 1 | Status: SHIPPED | OUTPATIENT
Start: 2019-08-01 | End: 2020-01-23 | Stop reason: SDUPTHER

## 2019-09-23 ENCOUNTER — APPOINTMENT (OUTPATIENT)
Dept: GENERAL RADIOLOGY | Age: 69
End: 2019-09-23
Payer: MEDICARE

## 2019-09-23 ENCOUNTER — HOSPITAL ENCOUNTER (EMERGENCY)
Age: 69
Discharge: HOME | End: 2019-09-23
Attending: EMERGENCY MEDICINE
Payer: MEDICARE

## 2019-09-23 VITALS
DIASTOLIC BLOOD PRESSURE: 89 MMHG | BODY MASS INDEX: 33.18 KG/M2 | SYSTOLIC BLOOD PRESSURE: 158 MMHG | WEIGHT: 245 LBS | RESPIRATION RATE: 20 BRPM | HEIGHT: 72 IN | OXYGEN SATURATION: 97 % | HEART RATE: 53 BPM | TEMPERATURE: 98.3 F

## 2019-09-23 DIAGNOSIS — M25.551 RIGHT HIP PAIN: Primary | ICD-10-CM

## 2019-09-23 PROCEDURE — 6360000002 HC RX W HCPCS: Performed by: EMERGENCY MEDICINE

## 2019-09-23 PROCEDURE — 96372 THER/PROPH/DIAG INJ SC/IM: CPT

## 2019-09-23 PROCEDURE — 6370000000 HC RX 637 (ALT 250 FOR IP): Performed by: EMERGENCY MEDICINE

## 2019-09-23 PROCEDURE — 73502 X-RAY EXAM HIP UNI 2-3 VIEWS: CPT

## 2019-09-23 PROCEDURE — 99283 EMERGENCY DEPT VISIT LOW MDM: CPT

## 2019-09-23 RX ORDER — HYDROCODONE BITARTRATE AND ACETAMINOPHEN 5; 325 MG/1; MG/1
1 TABLET ORAL EVERY 6 HOURS PRN
Qty: 20 TABLET | Refills: 0 | Status: SHIPPED | OUTPATIENT
Start: 2019-09-23 | End: 2019-09-28

## 2019-09-23 RX ORDER — KETOROLAC TROMETHAMINE 30 MG/ML
30 INJECTION, SOLUTION INTRAMUSCULAR; INTRAVENOUS ONCE
Status: COMPLETED | OUTPATIENT
Start: 2019-09-23 | End: 2019-09-23

## 2019-09-23 RX ORDER — HYDROCODONE BITARTRATE AND ACETAMINOPHEN 5; 325 MG/1; MG/1
1 TABLET ORAL ONCE
Status: COMPLETED | OUTPATIENT
Start: 2019-09-23 | End: 2019-09-23

## 2019-09-23 RX ORDER — LIDOCAINE 4 G/G
1 PATCH TOPICAL ONCE
Status: DISCONTINUED | OUTPATIENT
Start: 2019-09-23 | End: 2019-09-23 | Stop reason: HOSPADM

## 2019-09-23 RX ADMIN — KETOROLAC TROMETHAMINE 30 MG: 30 INJECTION, SOLUTION INTRAMUSCULAR at 16:41

## 2019-09-23 RX ADMIN — HYDROCODONE BITARTRATE AND ACETAMINOPHEN 1 TABLET: 5; 325 TABLET ORAL at 16:41

## 2019-09-23 ASSESSMENT — PAIN SCALES - GENERAL
PAINLEVEL_OUTOF10: 8
PAINLEVEL_OUTOF10: 8

## 2019-09-23 ASSESSMENT — PAIN DESCRIPTION - PAIN TYPE: TYPE: ACUTE PAIN;CHRONIC PAIN

## 2019-09-23 ASSESSMENT — PAIN DESCRIPTION - ORIENTATION: ORIENTATION: RIGHT

## 2019-09-23 ASSESSMENT — PAIN DESCRIPTION - LOCATION: LOCATION: GROIN

## 2019-09-25 ENCOUNTER — TELEPHONE (OUTPATIENT)
Dept: INTERNAL MEDICINE CLINIC | Age: 69
End: 2019-09-25

## 2019-10-09 ENCOUNTER — OFFICE VISIT (OUTPATIENT)
Dept: INTERNAL MEDICINE CLINIC | Age: 69
End: 2019-10-09
Payer: MEDICARE

## 2019-10-09 VITALS
HEART RATE: 66 BPM | DIASTOLIC BLOOD PRESSURE: 86 MMHG | OXYGEN SATURATION: 95 % | WEIGHT: 242 LBS | SYSTOLIC BLOOD PRESSURE: 128 MMHG | BODY MASS INDEX: 34.65 KG/M2 | HEIGHT: 70 IN

## 2019-10-09 DIAGNOSIS — R73.02 IGT (IMPAIRED GLUCOSE TOLERANCE): Primary | ICD-10-CM

## 2019-10-09 DIAGNOSIS — M89.8X9 ABNORMAL BONE FORMATION: ICD-10-CM

## 2019-10-09 DIAGNOSIS — M47.816 ARTHRITIS OF LUMBAR SPINE: ICD-10-CM

## 2019-10-09 DIAGNOSIS — I10 ESSENTIAL HYPERTENSION: ICD-10-CM

## 2019-10-09 LAB
CHP ED QC CHECK: NORMAL
GLUCOSE BLD-MCNC: 138 MG/DL
HBA1C MFR BLD: 6.1 %

## 2019-10-09 PROCEDURE — 3017F COLORECTAL CA SCREEN DOC REV: CPT | Performed by: INTERNAL MEDICINE

## 2019-10-09 PROCEDURE — 4004F PT TOBACCO SCREEN RCVD TLK: CPT | Performed by: INTERNAL MEDICINE

## 2019-10-09 PROCEDURE — 4040F PNEUMOC VAC/ADMIN/RCVD: CPT | Performed by: INTERNAL MEDICINE

## 2019-10-09 PROCEDURE — G0008 ADMIN INFLUENZA VIRUS VAC: HCPCS | Performed by: INTERNAL MEDICINE

## 2019-10-09 PROCEDURE — G8482 FLU IMMUNIZE ORDER/ADMIN: HCPCS | Performed by: INTERNAL MEDICINE

## 2019-10-09 PROCEDURE — G8417 CALC BMI ABV UP PARAM F/U: HCPCS | Performed by: INTERNAL MEDICINE

## 2019-10-09 PROCEDURE — 99214 OFFICE O/P EST MOD 30 MIN: CPT | Performed by: INTERNAL MEDICINE

## 2019-10-09 PROCEDURE — 90653 IIV ADJUVANT VACCINE IM: CPT | Performed by: INTERNAL MEDICINE

## 2019-10-09 PROCEDURE — 1123F ACP DISCUSS/DSCN MKR DOCD: CPT | Performed by: INTERNAL MEDICINE

## 2019-10-09 PROCEDURE — 96372 THER/PROPH/DIAG INJ SC/IM: CPT | Performed by: INTERNAL MEDICINE

## 2019-10-09 PROCEDURE — G8427 DOCREV CUR MEDS BY ELIG CLIN: HCPCS | Performed by: INTERNAL MEDICINE

## 2019-10-09 PROCEDURE — 83036 HEMOGLOBIN GLYCOSYLATED A1C: CPT | Performed by: INTERNAL MEDICINE

## 2019-10-09 PROCEDURE — 82962 GLUCOSE BLOOD TEST: CPT | Performed by: INTERNAL MEDICINE

## 2019-10-09 RX ORDER — TRIAMCINOLONE ACETONIDE 40 MG/ML
40 INJECTION, SUSPENSION INTRA-ARTICULAR; INTRAMUSCULAR ONCE
Status: COMPLETED | OUTPATIENT
Start: 2019-10-09 | End: 2019-10-09

## 2019-10-09 RX ORDER — MELOXICAM 7.5 MG/1
7.5 TABLET ORAL DAILY PRN
Qty: 30 TABLET | Refills: 2 | Status: SHIPPED | OUTPATIENT
Start: 2019-10-09 | End: 2021-06-02

## 2019-10-09 RX ORDER — KETOROLAC TROMETHAMINE 30 MG/ML
30 INJECTION, SOLUTION INTRAMUSCULAR; INTRAVENOUS ONCE
Status: COMPLETED | OUTPATIENT
Start: 2019-10-09 | End: 2019-10-09

## 2019-10-09 RX ADMIN — TRIAMCINOLONE ACETONIDE 40 MG: 40 INJECTION, SUSPENSION INTRA-ARTICULAR; INTRAMUSCULAR at 15:32

## 2019-10-09 RX ADMIN — KETOROLAC TROMETHAMINE 30 MG: 30 INJECTION, SOLUTION INTRAMUSCULAR; INTRAVENOUS at 15:30

## 2019-10-14 ENCOUNTER — HOSPITAL ENCOUNTER (OUTPATIENT)
Dept: GENERAL RADIOLOGY | Age: 69
Discharge: HOME | End: 2019-10-14
Payer: MEDICARE

## 2019-10-14 DIAGNOSIS — M85.851 OSTEOPENIA OF RIGHT HIP: ICD-10-CM

## 2019-10-14 DIAGNOSIS — M16.11 PRIMARY OSTEOARTHRITIS OF RIGHT HIP: ICD-10-CM

## 2019-10-14 DIAGNOSIS — M89.8X9 ABNORMAL BONE FORMATION: ICD-10-CM

## 2019-10-14 PROCEDURE — 77080 DXA BONE DENSITY AXIAL: CPT

## 2019-11-04 ENCOUNTER — HOSPITAL ENCOUNTER (OUTPATIENT)
Dept: NUCLEAR MEDICINE | Age: 69
Discharge: HOME | End: 2019-11-04
Payer: MEDICARE

## 2019-11-04 DIAGNOSIS — M89.8X9 ABNORMAL BONE FORMATION: ICD-10-CM

## 2019-11-04 PROCEDURE — 78315 BONE IMAGING 3 PHASE: CPT

## 2019-11-04 PROCEDURE — A9503 TC99M MEDRONATE: HCPCS | Performed by: INTERNAL MEDICINE

## 2019-11-04 PROCEDURE — 3430000000 HC RX DIAGNOSTIC RADIOPHARMACEUTICAL: Performed by: INTERNAL MEDICINE

## 2019-11-04 RX ORDER — TC 99M MEDRONATE 20 MG/10ML
24.3 INJECTION, POWDER, LYOPHILIZED, FOR SOLUTION INTRAVENOUS
Status: COMPLETED | OUTPATIENT
Start: 2019-11-04 | End: 2019-11-04

## 2019-11-04 RX ADMIN — TC 99M MEDRONATE 24.3 MILLICURIE: 20 INJECTION, POWDER, LYOPHILIZED, FOR SOLUTION INTRAVENOUS at 11:43

## 2020-01-23 ENCOUNTER — OFFICE VISIT (OUTPATIENT)
Dept: INTERNAL MEDICINE CLINIC | Age: 70
End: 2020-01-23
Payer: MEDICARE

## 2020-01-23 VITALS
DIASTOLIC BLOOD PRESSURE: 86 MMHG | HEIGHT: 69 IN | HEART RATE: 64 BPM | OXYGEN SATURATION: 98 % | SYSTOLIC BLOOD PRESSURE: 136 MMHG | WEIGHT: 247 LBS | BODY MASS INDEX: 36.58 KG/M2

## 2020-01-23 LAB
CHP ED QC CHECK: NORMAL
GLUCOSE BLD-MCNC: 134 MG/DL
HBA1C MFR BLD: 5.8 %

## 2020-01-23 PROCEDURE — 83036 HEMOGLOBIN GLYCOSYLATED A1C: CPT | Performed by: INTERNAL MEDICINE

## 2020-01-23 PROCEDURE — 99214 OFFICE O/P EST MOD 30 MIN: CPT | Performed by: INTERNAL MEDICINE

## 2020-01-23 PROCEDURE — G8427 DOCREV CUR MEDS BY ELIG CLIN: HCPCS | Performed by: INTERNAL MEDICINE

## 2020-01-23 PROCEDURE — 4040F PNEUMOC VAC/ADMIN/RCVD: CPT | Performed by: INTERNAL MEDICINE

## 2020-01-23 PROCEDURE — 4004F PT TOBACCO SCREEN RCVD TLK: CPT | Performed by: INTERNAL MEDICINE

## 2020-01-23 PROCEDURE — 3017F COLORECTAL CA SCREEN DOC REV: CPT | Performed by: INTERNAL MEDICINE

## 2020-01-23 PROCEDURE — 82962 GLUCOSE BLOOD TEST: CPT | Performed by: INTERNAL MEDICINE

## 2020-01-23 PROCEDURE — G8417 CALC BMI ABV UP PARAM F/U: HCPCS | Performed by: INTERNAL MEDICINE

## 2020-01-23 PROCEDURE — G8482 FLU IMMUNIZE ORDER/ADMIN: HCPCS | Performed by: INTERNAL MEDICINE

## 2020-01-23 PROCEDURE — 1123F ACP DISCUSS/DSCN MKR DOCD: CPT | Performed by: INTERNAL MEDICINE

## 2020-01-23 RX ORDER — TADALAFIL 20 MG/1
TABLET ORAL
Qty: 30 TABLET | Refills: 1 | Status: ON HOLD | OUTPATIENT
Start: 2020-01-23 | End: 2021-09-03 | Stop reason: HOSPADM

## 2020-01-23 RX ORDER — MELOXICAM 7.5 MG/1
15 TABLET ORAL DAILY PRN
Qty: 60 TABLET | Refills: 5 | Status: ON HOLD | OUTPATIENT
Start: 2020-01-23 | End: 2021-09-03

## 2020-01-23 RX ORDER — TADALAFIL 20 MG/1
20 TABLET ORAL DAILY PRN
Qty: 30 TABLET | Refills: 1 | Status: SHIPPED | OUTPATIENT
Start: 2020-01-23 | End: 2020-09-25 | Stop reason: SDUPTHER

## 2020-01-23 ASSESSMENT — PATIENT HEALTH QUESTIONNAIRE - PHQ9
SUM OF ALL RESPONSES TO PHQ QUESTIONS 1-9: 0
2. FEELING DOWN, DEPRESSED OR HOPELESS: 0
SUM OF ALL RESPONSES TO PHQ9 QUESTIONS 1 & 2: 0
1. LITTLE INTEREST OR PLEASURE IN DOING THINGS: 0
SUM OF ALL RESPONSES TO PHQ QUESTIONS 1-9: 0

## 2020-02-24 RX ORDER — CANDESARTAN 32 MG/1
TABLET ORAL
Qty: 30 TABLET | Refills: 5 | Status: SHIPPED | OUTPATIENT
Start: 2020-02-24 | End: 2020-08-22 | Stop reason: SDUPTHER

## 2020-03-23 RX ORDER — POTASSIUM CHLORIDE 20 MEQ/1
TABLET, EXTENDED RELEASE ORAL
Qty: 180 TABLET | Refills: 2 | Status: SHIPPED | OUTPATIENT
Start: 2020-03-23 | End: 2021-01-17 | Stop reason: SDUPTHER

## 2020-04-06 RX ORDER — AMLODIPINE BESYLATE AND ATORVASTATIN CALCIUM 10; 10 MG/1; MG/1
TABLET, FILM COATED ORAL
Qty: 90 TABLET | Refills: 3 | Status: SHIPPED | OUTPATIENT
Start: 2020-04-06 | End: 2021-04-05

## 2020-04-29 ENCOUNTER — OFFICE VISIT (OUTPATIENT)
Dept: INTERNAL MEDICINE CLINIC | Age: 70
End: 2020-04-29
Payer: MEDICARE

## 2020-04-29 PROCEDURE — 1123F ACP DISCUSS/DSCN MKR DOCD: CPT | Performed by: INTERNAL MEDICINE

## 2020-04-29 PROCEDURE — 4040F PNEUMOC VAC/ADMIN/RCVD: CPT | Performed by: INTERNAL MEDICINE

## 2020-04-29 PROCEDURE — 99214 OFFICE O/P EST MOD 30 MIN: CPT | Performed by: INTERNAL MEDICINE

## 2020-04-29 PROCEDURE — 3017F COLORECTAL CA SCREEN DOC REV: CPT | Performed by: INTERNAL MEDICINE

## 2020-04-29 PROCEDURE — 4004F PT TOBACCO SCREEN RCVD TLK: CPT | Performed by: INTERNAL MEDICINE

## 2020-04-29 PROCEDURE — G8417 CALC BMI ABV UP PARAM F/U: HCPCS | Performed by: INTERNAL MEDICINE

## 2020-04-29 PROCEDURE — G8427 DOCREV CUR MEDS BY ELIG CLIN: HCPCS | Performed by: INTERNAL MEDICINE

## 2020-04-29 ASSESSMENT — ENCOUNTER SYMPTOMS
EYES NEGATIVE: 1
GASTROINTESTINAL NEGATIVE: 1
RESPIRATORY NEGATIVE: 1

## 2020-04-29 NOTE — PROGRESS NOTES
GASTROENTEROLOGY CONSULTATION      Donald Raza  [id number] [facility] [id number]  69 year old male     Admission Date/Time: 7/26/2019  Primary Care Provider: Sukh Owen  Referring / Attending Physician: Dr. Fletcher     We were asked to see the patient in consultation by Dr. Fletcher for evaluation of anemia.        HPI:  Donald Raza is a 69 year old male with history of end-stage renal disease on hemodialysis, HIV, COPD, DM 2, coronary artery disease on Plavix, chronic anemia on iron who presented to the emergency room with chest pain.    Gastroenterology was consulted as hemoglobin is drifting downward and patient reports black stool on iron.  Patient is being followed by gastroenterology for anemia work-up.  There are tentative plans for a small bowel capsule endoscopy if he passes the patency capsule study.  However patient reports he has had dark black stool ever since starting iron weeks ago.  He had been having some loose stool over the past 2 days.  He denies any blood in his stool.  He has no abdominal pain.  No nausea no vomiting.  He denies any fever or chills.  Hemoglobin was 6.6 when he came to the emergency room.  Baseline hemoglobin per outside records appears to be around 7.    He is followed by gastroenterology at [facility].  He reports having an EGD and colonoscopy somewhat recently.  He had a patency (agile) Placed on 7/24.  He is uncertain if it had passed.  An abdominal x-ray was completed 7/25.  There was an ill-defined density in the right lower quadrant of uncertain clinical significance.  There is no definite radiopaque foreign body.    Surgical history includes Partial sigmoid resection for diverticulitis in 1999.  He does also have a history of colon polyps.  Last colonoscopy was in May 2017 where 2 small polyps were removed from his rectum.     PAST MEDICAL HISTORY:  Patient Active Problem List    Diagnosis Date Noted     Bilateral pneumonia 01/07/2017     Priority:  High     Hypertensive emergency 05/13/2019     Priority: Medium     Cardiac pacemaker in situ 05/03/2019     Priority: Medium     Angina at rest (H) 05/02/2019     Priority: Medium     Chest pain 05/02/2019     Priority: Medium     Dialysis patient (H) 01/29/2019     Priority: Medium     COPD exacerbation (H) 01/06/2019     Priority: Medium     COPD (chronic obstructive pulmonary disease) (H) 01/03/2019     Priority: Medium     Sepsis (H) 12/06/2018     Priority: Medium     Cerebral hypoperfusion, transient and thought primarily to low volume/BP assoc with dialysis 09/08/2018     Priority: Medium     Expressive aphasia 09/08/2018     Priority: Medium     Generalized muscle weakness 09/08/2018     Priority: Medium     TIA (transient ischemic attack) 09/01/2018     Priority: Medium     Generalized weakness 07/12/2018     Priority: Medium     Acute pulmonary edema (H) 03/08/2018     Priority: Medium     Respiratory failure (H) 03/08/2018     Priority: Medium     Pneumonia 02/01/2018     Priority: Medium     Acute respiratory failure with hypoxia (H) 02/01/2018     Priority: Medium     Bronchitis 01/19/2018     Priority: Medium     Unstable angina pectoris (H) 12/25/2017     Priority: Medium     Dialysis AV fistula malfunction (H) 09/19/2017     Priority: Medium     Diarrhea 01/25/2017     Priority: Medium     Anemia 01/05/2017     Priority: Medium     AIDS (acquired immune deficiency syndrome) (H) 06/20/2016     Priority: Medium     Bradycardia 05/28/2016     Priority: Medium     ESRD (end stage renal disease) on dialysis (H) 05/06/2016     Priority: Medium     Pulmonary hypertension (H) 05/06/2016     Priority: Medium     Dilated aortic root (H) 05/06/2016     Priority: Medium     Unstable angina (H) 03/04/2016     Priority: Medium     Coronary artery disease      Priority: Medium     12/2015 - Successful angioplasty and stenting (2.25 x 8 mm Promus premier drug-eluting stent) of the ostium of the first major diagonal  Mouth/Throat: Mucous membranes are moist.     External Ears [x] Normal  [] Abnormal-     Neck: [x] No visualized mass     Pulmonary/Chest: [x] Respiratory effort normal.  [x] No visualized signs of difficulty breathing or respiratory distress        [] Abnormal-      Musculoskeletal:   [x] Normal gait with no signs of ataxia         [x] Normal range of motion of neck and hip. [] Abnormal-       Neurological:        [x] No Facial Asymmetry (Cranial nerve 7 motor function) (limited exam to video visit)          [x] No gaze palsy        [] Abnormal-         Skin:        [x] No significant exanthematous lesions or discoloration noted on facial skin         [] Abnormal-            Psychiatric:       [x] Normal Affect [] No Hallucinations        [] Abnormal-     Other pertinent observable physical exam findings-     ASSESSMENT/PLAN:   Diagnosis Orders   1. Essential hypertension  Continue same med regimen. DASH and low sodium diet discussed. 2. Mixed hyperlipidemia  Heart healthy diet and statin compliance discussed. 3. IGT (impaired glucose tolerance)  Weight loss, diet, and regular physical activity discussed. 4. Right hip pain  DJD clinically. Exercises, OTC NSAIDs. Due to this being a TeleHealth encounter ([facility]), evaluation of the following organ systems was limited: Vitals/Constitutional/EENT/Resp/CV/GI//MS/Neuro/Skin/Heme-Lymph-Imm. Pursuant to the emergency declaration under the [facility], [address] [place] [address] authority and the [employer] General Act, this Virtual Visit was conducted with patient's (and/or legal guardian's) consent, to reduce the patient's risk of exposure to COVID-19 and provide necessary medical care.   The patient (and/or legal guardian) has also been advised to contact this office for worsening conditions or problems, and seek emergency medical which is adjacent to the previously placed proximal LAD stent  2005 - VANITA x2 to LAD,         Acute chest pain 06/17/2015     Priority: Medium     Organ transplant candidate 12/05/2012     Priority: Medium     Type 2 diabetes mellitus (H)      Priority: Medium     Human immunodeficiency virus (HIV) disease (HCC)      Priority: Medium     Allergic rhinitis      Priority: Medium     Problem list name updated by automated process. 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       MEDICATIONS:   Prior to Admission medications    Medication Sig Start Date End Date Taking? Authorizing Provider   abacavir (ZIAGEN) 300 MG tablet Take 600 mg by mouth every evening  5/27/16  Yes Abstract, Provider   Acetaminophen (TYLENOL PO) Take 325 mg by mouth every 6 hours as needed for mild pain or fever    Yes Unknown, Entered By History   albuterol (PROAIR HFA/PROVENTIL HFA/VENTOLIN HFA) 108 (90 BASE) MCG/ACT Inhaler Inhale 2 puffs into the lungs every 6 hours as needed for shortness of breath / dyspnea or wheezing 4/11/17  Yes Nelson Worthy MD   amLODIPine (NORVASC) 5 MG tablet Take 1 tablet (5 mg) by mouth 2 times daily 5/15/19  Yes Danny Azar MD   aspirin EC 81 MG EC tablet Take 1 tablet (81 mg) by mouth daily 3/17/18  Yes Clemencia Pal MD   atorvastatin (LIPITOR) 40 MG tablet Take 40 mg by mouth At Bedtime   Yes Unknown, Entered By History   B COMPLEX-C-FOLIC ACID PO Take 1 tablet by mouth At Bedtime    Yes Reported, Patient   carvedilol (COREG) 25 MG tablet Take 1 tablet (25 mg) by mouth 2  times daily (with meals) 5/15/19  Yes Danny Azar MD   chlorhexidine (CHLORHEXIDINE) 0.12 % solution Rinse and gargle 15 ml by mouth twice a day as directed. 5/27/16  Yes Abstract, Provider   CLONAZEPAM PO Take 0.5 mg by mouth nightly as needed for anxiety (restless legs)   Yes Unknown, Entered By History   CLOPIDOGREL BISULFATE PO Take 75 mg by mouth every evening    Yes Unknown, Entered By History   dapsone 100 MG tablet Take 100 mg by mouth At Bedtime    Yes Reported, Patient   Darunavir Ethanolate (PREZISTA PO) Take 800 mg by mouth At Bedtime.   Yes Reported, Patient   dolutegravir (TIVICAY) 50 MG tablet Take 50 mg by mouth At Bedtime   Yes Unknown, Entered By History   gabapentin (NEURONTIN) 300 MG capsule Take 300 mg by mouth as needed May take after HD   Yes Unknown, Entered By History   gabapentin (NEURONTIN) 300 MG capsule Take 300 mg by mouth 2 times daily   Yes Unknown, Entered By History   guaiFENesin (MUCINEX) 600 MG 12 hr tablet Take 1,200 mg by mouth 2 times daily as needed    Yes Unknown, Entered By History   imiquimod (ALDARA) 5 % cream Apply topically as needed   Yes Reported, Patient   insulin glargine (LANTUS SOLOSTAR PEN) 100 UNIT/ML pen Inject 15 Units Subcutaneous 2 times daily  1/30/19  Yes Dot Ansari MD   insulin lispro (HUMALOG PEN) 100 UNIT/ML pen Inject 12 Units Subcutaneous 3 times daily (before meals)   Yes Reported, Patient   ipratropium - albuterol 0.5 mg/2.5 mg/3 mL (DUONEB) 0.5-2.5 (3) MG/3ML neb solution Take 1 vial (3 mLs) by nebulization every 6 hours as needed for shortness of breath / dyspnea or wheezing 2/4/18  Yes Catrachito Rosado DO   irbesartan (AVAPRO) 300 MG tablet Take 1 tablet (300 mg) by mouth At Bedtime 3/16/18  Yes Clemencia Pal MD   Isosorbide Mononitrate  MG TB24 Take 1 tablet (120 mg) by mouth every evening 1/17/18  Yes Yuki Hinojosa APRN CNP   ketoconazole (NIZORAL) 2 % cream Apply topically daily    Yes Reported, Patient  "  ketorolac (ACULAR) 0.5 % ophthalmic solution INSTILL 1 GTT IN OPERATIVE EYE QID STARTING 3 DAYS PRIOR TO SURGERY. CONTINUE AFTER SURGERY FOR 2 WEEKS FOLLOWING 7/3/19  Yes Unknown, Entered By History   MAGNESIUM OXIDE PO Take 400 mg by mouth At Bedtime   Yes Unknown, Entered By History   melatonin 5 MG tablet Take 5 mg by mouth At Bedtime   Yes Reported, Patient   mirtazapine (REMERON) 15 MG tablet Take 15 mg by mouth At Bedtime   Yes Reported, Patient   moxifloxacin (VIGAMOX) 0.5 % ophthalmic solution 1 drop in operative eye 4x daily starting 3 days prior to surgery, then continue after surgery for 1 week following post op instructions 7/3/19  Yes Unknown, Entered By History   omega-3 acid ethyl esters (LOVAZA) 1 G capsule Take 2 g by mouth 2 times daily   Yes Unknown, Entered By History   pantoprazole (PROTONIX) 40 MG EC tablet Take 40 mg by mouth daily   Yes Unknown, Entered By History   polyethylene glycol (MIRALAX/GLYCOLAX) packet Take 1 packet by mouth daily as needed for constipation   Yes Reported, Patient   prednisoLONE acetate (PRED FORTE) 1 % ophthalmic suspension Start after surgery: 1 drop in operative eye 4x daily for 1 wk, then 3x daily for 1 wk, then 2x daily for 1 wk, then 1x daily for 1 wk 7/3/19  Yes Unknown, Entered By History   ritonavir (NORVIR) 100 MG capsule Take 1 capsule by mouth At Bedtime    Yes Reported, Patient   DOXERCALCIFEROL IV Inject 6 mcg into the vein three times a week (with dialysis)    Reported, Patient   epoetin brittni (EPOGEN,PROCRIT) 97361 UNIT/ML injection Inject 11,000 Units Subcutaneous three times a week WITH DIALYSIS    Unknown, Entered By History   iron sucrose (VENOFER) 20 MG/ML injection Inject 50 mg into the vein once a week WIth dialysis    Unknown, Entered By History   nitroglycerin (NITROSTAT) 0.4 MG SL tablet One tablet under the tongue every 5 minutes if needed for chest pain. May repeat every 5 minutes for a maximum of 3 doses in 15 minutes\" 6/15/15   Coumbe, " Lay PINA MD   Nutritional Supplements (NEPRO PO) 1-3 times daily    Unknown, Entered By History   sucroferric oxyhydroxide (VELPHORO) 500 MG CHEW chewable tablet Take 500 mg by mouth 3 times daily (with meals)    Unknown, Entered By History        ALLERGIES:   Allergies   Allergen Reactions     Calcium Acetate Other (See Comments)     Other reaction(s): Other, see comments  Pain in chest and back  Pain in chest area (sensitive skin)      Contrast Dye Other (See Comments)     Contrast nephropathy     Diagnostic X-Ray Materials Other (See Comments)     PN: renal failure     Lisinopril      Sulfa Drugs         SOCIAL HISTORY:  Social History     Tobacco Use     Smoking status: Former Smoker     Packs/day: 2.00     Years: 41.00     Pack years: 82.00     Types: Cigarettes     Last attempt to quit:      Years since quittin.5     Smokeless tobacco: Never Used   Substance Use Topics     Alcohol use: No     Alcohol/week: 0.0 oz     Drug use: No        FAMILY HISTORY:  Family History   Problem Relation Age of Onset     Heart Disease Brother 40        CABG     Kidney Disease Sister      Hypertension Sister      Heart Disease Brother         Dilated aorta        PHYSICAL EXAM:     /76 (BP Location: Right arm)   Pulse 76   Temp 98.4  F (36.9  C) (Oral)   Resp 22   Wt 91 kg (200 lb 9.6 oz)   SpO2 97%   BMI 27.98 kg/m       PHYSICAL EXAM:  GENERAL: NAD  SKIN: no suspicious lesions, rashes, jaundice  HEAD: Normocephalic. Atraumatic.  NECK: Neck supple. No adenopathy.   EYES: No scleral icterus  RESPIRATORY: Good transmission. CTA bilaterally.   CARDIOVASCULAR: RRR, normal S1, S2,  No murmur appreciated  GASTROINTESTINAL: +BS, soft, non tender, non distended, no guarding/rebound  JOINT/EXTREMITIES:  no gross deformities noted, normal muscle tone  NEURO: CN 2-12 grossly intact, no focal deficits  PSYCH: Normal affect        ADDITIONAL COMMENTS:   I reviewed the patient's new clinical lab test results.   Recent Labs    Lab Test 07/26/19 0622 07/26/19 0020 05/15/19  0738  05/13/19  0600  05/02/19  0427 12/05/18  2214   WBC 5.5 6.7 5.2   < > 8.4   < > 6.0   < >  --    HGB 6.3* 6.6* 8.4*   < > 9.0*   < > 8.1*   < >  --    * 101* 95   < > 94   < > 94   < >  --    PLT 89* 104* 119*   < > 157   < > 102*   < >  --    INR  --   --   --   --  1.10  --  1.06  --  1.03    < > = values in this interval not displayed.     Recent Labs   Lab Test 07/26/19  0622 07/26/19  0020 05/15/19  0738   POTASSIUM 4.5 4.5 4.4   CHLORIDE 98 96 96   CO2 22 22 27   BUN 83* 83* 64*   ANIONGAP 12 11 9     Recent Labs   Lab Test 07/26/19 0020 05/04/19  0605 05/03/19  0556 05/02/19 0427 02/11/19 2001 12/06/18  0920  09/02/18  0947 09/01/18  1146 07/12/18  1603  12/25/17  0855   ALBUMIN 3.7 3.4 3.4 4.0 4.0   < >  --    < >  --  4.1  --    < >  --    BILITOTAL 0.4  --   --  0.5 0.5   < >  --    < >  --  0.5  --    < >  --    ALT 18  --   --  17 16   < >  --    < >  --  19  --    < >  --    AST 18  --   --  11 23   < >  --    < >  --  19  --    < >  --    PROTEIN  --   --   --   --   --   --  >499*  --  >499*  --  >499*  --   --    LIPASE 168  --   --   --   --   --   --   --   --  205  --   --  187    < > = values in this interval not displayed.         CONSULTATION ASSESSMENT AND PLAN:    Donald Raza is a 69 year old male with history of end-stage renal disease on hemodialysis, HIV, COPD, DM 2, coronary artery disease on Plavix, chronic anemia on iron who presented to the emergency room with chest pain.  Cardiac work-up unrevealing.  Given dropping hemoglobin GI was consulted.    1.  Anemia, macrocytic: Hemoglobin on admission was 6.6.  In reviewing outpatient lab work his baseline appears to be in the 7.  He notes that his stool became dark once he started using iron.  No bowel movement since admission.  He is hemodynamically stable.  Suspect chronic kidney disease /chronic anemia.    -- Transfusions per medicine team.  -- No role for endoscopy  at this time.    -- Continue PPI.   -- No NSAIDs.  -- Follow up with Katarina IGLESIAS for ongoing anemia work-up.    I discussed the patient plan with Dr. Keyes. Thank you for asking us to participate in the care of this patient.    Gisela Anders PA-C  [facility] ( [facility])

## 2020-08-24 RX ORDER — CANDESARTAN 32 MG/1
32 TABLET ORAL DAILY
Qty: 30 TABLET | Refills: 5 | Status: SHIPPED | OUTPATIENT
Start: 2020-08-24 | End: 2021-03-02 | Stop reason: SDUPTHER

## 2020-09-18 ASSESSMENT — LIFESTYLE VARIABLES
AUDIT TOTAL SCORE: 0
HOW OFTEN DURING THE LAST YEAR HAVE YOU FOUND THAT YOU WERE NOT ABLE TO STOP DRINKING ONCE YOU HAD STARTED: NEVER
HOW OFTEN DURING THE LAST YEAR HAVE YOU FAILED TO DO WHAT WAS NORMALLY EXPECTED FROM YOU BECAUSE OF DRINKING: NEVER
HOW OFTEN DURING THE LAST YEAR HAVE YOU HAD A FEELING OF GUILT OR REMORSE AFTER DRINKING: 0
HOW OFTEN DO YOU HAVE A DRINK CONTAINING ALCOHOL: 3
HAS A RELATIVE, FRIEND, DOCTOR, OR ANOTHER HEALTH PROFESSIONAL EXPRESSED CONCERN ABOUT YOUR DRINKING OR SUGGESTED YOU CUT DOWN: 0
HAS A RELATIVE, FRIEND, DOCTOR, OR ANOTHER HEALTH PROFESSIONAL EXPRESSED CONCERN ABOUT YOUR DRINKING OR SUGGESTED YOU CUT DOWN: NO
AUDIT TOTAL SCORE: 4
HAVE YOU OR SOMEONE ELSE BEEN INJURED AS A RESULT OF YOUR DRINKING: 0
AUDIT-C TOTAL SCORE: 0
HOW OFTEN DURING THE LAST YEAR HAVE YOU BEEN UNABLE TO REMEMBER WHAT HAPPENED THE NIGHT BEFORE BECAUSE YOU HAD BEEN DRINKING: 0
HAVE YOU OR SOMEONE ELSE BEEN INJURED AS A RESULT OF YOUR DRINKING: NO
HOW OFTEN DURING THE LAST YEAR HAVE YOU NEEDED AN ALCOHOLIC DRINK FIRST THING IN THE MORNING TO GET YOURSELF GOING AFTER A NIGHT OF HEAVY DRINKING: NEVER
HOW OFTEN DO YOU HAVE SIX OR MORE DRINKS ON ONE OCCASION: 0
HOW MANY STANDARD DRINKS CONTAINING ALCOHOL DO YOU HAVE ON A TYPICAL DAY: THREE OR FOUR
HOW OFTEN DURING THE LAST YEAR HAVE YOU FAILED TO DO WHAT WAS NORMALLY EXPECTED FROM YOU BECAUSE OF DRINKING: 0
HOW MANY STANDARD DRINKS CONTAINING ALCOHOL DO YOU HAVE ON A TYPICAL DAY: 1
HOW OFTEN DURING THE LAST YEAR HAVE YOU NEEDED AN ALCOHOLIC DRINK FIRST THING IN THE MORNING TO GET YOURSELF GOING AFTER A NIGHT OF HEAVY DRINKING: 0
HOW OFTEN DO YOU HAVE SIX OR MORE DRINKS ON ONE OCCASION: NEVER
HOW OFTEN DO YOU HAVE A DRINK CONTAINING ALCOHOL: TWO TO THREE TIMES A WEEK
AUDIT-C TOTAL SCORE: 4
HOW OFTEN DURING THE LAST YEAR HAVE YOU HAD A FEELING OF GUILT OR REMORSE AFTER DRINKING: NEVER
HOW OFTEN DURING THE LAST YEAR HAVE YOU BEEN UNABLE TO REMEMBER WHAT HAPPENED THE NIGHT BEFORE BECAUSE YOU HAD BEEN DRINKING: NEVER
HOW OFTEN DURING THE LAST YEAR HAVE YOU FOUND THAT YOU WERE NOT ABLE TO STOP DRINKING ONCE YOU HAD STARTED: 0

## 2020-09-18 ASSESSMENT — PATIENT HEALTH QUESTIONNAIRE - PHQ9
2. FEELING DOWN, DEPRESSED OR HOPELESS: 0
SUM OF ALL RESPONSES TO PHQ QUESTIONS 1-9: 0
SUM OF ALL RESPONSES TO PHQ QUESTIONS 1-9: 0
SUM OF ALL RESPONSES TO PHQ9 QUESTIONS 1 & 2: 0
1. LITTLE INTEREST OR PLEASURE IN DOING THINGS: 0

## 2020-09-25 ENCOUNTER — OFFICE VISIT (OUTPATIENT)
Dept: INTERNAL MEDICINE CLINIC | Age: 70
End: 2020-09-25
Payer: MEDICARE

## 2020-09-25 VITALS
OXYGEN SATURATION: 97 % | WEIGHT: 250.4 LBS | BODY MASS INDEX: 37.09 KG/M2 | HEART RATE: 87 BPM | HEIGHT: 69 IN | SYSTOLIC BLOOD PRESSURE: 136 MMHG | TEMPERATURE: 97.1 F | DIASTOLIC BLOOD PRESSURE: 86 MMHG

## 2020-09-25 DIAGNOSIS — I10 ESSENTIAL HYPERTENSION: ICD-10-CM

## 2020-09-25 DIAGNOSIS — R73.02 IGT (IMPAIRED GLUCOSE TOLERANCE): ICD-10-CM

## 2020-09-25 DIAGNOSIS — E78.2 MIXED HYPERLIPIDEMIA: ICD-10-CM

## 2020-09-25 LAB
A/G RATIO: 1.5 (ref 1.1–2.2)
ALBUMIN SERPL-MCNC: 4.3 G/DL (ref 3.4–5)
ALP BLD-CCNC: 58 U/L (ref 40–129)
ALT SERPL-CCNC: 36 U/L (ref 10–40)
ANION GAP SERPL CALCULATED.3IONS-SCNC: 16 MMOL/L (ref 3–16)
AST SERPL-CCNC: 30 U/L (ref 15–37)
BASOPHILS ABSOLUTE: 0.1 K/UL (ref 0–0.2)
BASOPHILS RELATIVE PERCENT: 1 %
BILIRUB SERPL-MCNC: 0.4 MG/DL (ref 0–1)
BUN BLDV-MCNC: 24 MG/DL (ref 7–20)
CALCIUM SERPL-MCNC: 9.8 MG/DL (ref 8.3–10.6)
CHLORIDE BLD-SCNC: 102 MMOL/L (ref 99–110)
CHOLESTEROL, TOTAL: 151 MG/DL (ref 0–199)
CO2: 24 MMOL/L (ref 21–32)
CREAT SERPL-MCNC: 1.4 MG/DL (ref 0.8–1.3)
CREATININE URINE: 268.3 MG/DL (ref 39–259)
EOSINOPHILS ABSOLUTE: 0.3 K/UL (ref 0–0.6)
EOSINOPHILS RELATIVE PERCENT: 5.5 %
ESTIMATED AVERAGE GLUCOSE: 122.6 MG/DL
GFR AFRICAN AMERICAN: >60
GFR NON-AFRICAN AMERICAN: 50
GLOBULIN: 2.9 G/DL
GLUCOSE BLD-MCNC: 125 MG/DL (ref 70–99)
HBA1C MFR BLD: 5.9 %
HCT VFR BLD CALC: 41.2 % (ref 40.5–52.5)
HDLC SERPL-MCNC: 52 MG/DL (ref 40–60)
HEMOGLOBIN: 13.6 G/DL (ref 13.5–17.5)
LDL CHOLESTEROL CALCULATED: 84 MG/DL
LYMPHOCYTES ABSOLUTE: 1.3 K/UL (ref 1–5.1)
LYMPHOCYTES RELATIVE PERCENT: 22.5 %
MCH RBC QN AUTO: 27.7 PG (ref 26–34)
MCHC RBC AUTO-ENTMCNC: 32.9 G/DL (ref 31–36)
MCV RBC AUTO: 84 FL (ref 80–100)
MICROALBUMIN UR-MCNC: 1.5 MG/DL
MICROALBUMIN/CREAT UR-RTO: 5.6 MG/G (ref 0–30)
MONOCYTES ABSOLUTE: 0.6 K/UL (ref 0–1.3)
MONOCYTES RELATIVE PERCENT: 10.7 %
NEUTROPHILS ABSOLUTE: 3.6 K/UL (ref 1.7–7.7)
NEUTROPHILS RELATIVE PERCENT: 60.3 %
PDW BLD-RTO: 15.9 % (ref 12.4–15.4)
PLATELET # BLD: 218 K/UL (ref 135–450)
PMV BLD AUTO: 8.2 FL (ref 5–10.5)
POTASSIUM SERPL-SCNC: 4.1 MMOL/L (ref 3.5–5.1)
RBC # BLD: 4.91 M/UL (ref 4.2–5.9)
SODIUM BLD-SCNC: 142 MMOL/L (ref 136–145)
TOTAL PROTEIN: 7.2 G/DL (ref 6.4–8.2)
TRIGL SERPL-MCNC: 73 MG/DL (ref 0–150)
VLDLC SERPL CALC-MCNC: 15 MG/DL
WBC # BLD: 5.9 K/UL (ref 4–11)

## 2020-09-25 PROCEDURE — 1123F ACP DISCUSS/DSCN MKR DOCD: CPT | Performed by: INTERNAL MEDICINE

## 2020-09-25 PROCEDURE — 3017F COLORECTAL CA SCREEN DOC REV: CPT | Performed by: INTERNAL MEDICINE

## 2020-09-25 PROCEDURE — G0438 PPPS, INITIAL VISIT: HCPCS | Performed by: INTERNAL MEDICINE

## 2020-09-25 PROCEDURE — 4040F PNEUMOC VAC/ADMIN/RCVD: CPT | Performed by: INTERNAL MEDICINE

## 2020-09-25 RX ORDER — ROSUVASTATIN CALCIUM 10 MG/1
10 TABLET, COATED ORAL NIGHTLY
Qty: 90 TABLET | Refills: 3 | Status: ON HOLD | OUTPATIENT
Start: 2020-09-25 | End: 2021-09-03

## 2020-09-25 RX ORDER — TADALAFIL 20 MG/1
20 TABLET ORAL DAILY PRN
Qty: 30 TABLET | Refills: 5 | Status: SHIPPED | OUTPATIENT
Start: 2020-09-25 | End: 2022-02-22 | Stop reason: SDUPTHER

## 2020-09-25 NOTE — PROGRESS NOTES
Subjective:      Patient ID: Ana Maria Valencia is a 65 y.o. male. HPIPhishMe negrete. He plans to work on these documents. He smokes a cigar every now and then. Review of Systems see below. Objective:   Physical Exam see below    Assessment:    see below      Plan:    see below. Supa Mcallister MD   Medicare Annual Wellness Visit  Name: Kaylee Hastings Date: 2020   MRN: <[id number]> Sex: Male   Age: 65 y.o. Ethnicity: Non-/Non    : 1950 Race: Black      Ana Maria Valencia is here for Medicare AWV    Screenings for behavioral, psychosocial and functional/safety risks, and cognitive dysfunction are all negative except as indicated below. These results, as well as other patient data from the [address] form, are documented in Flowsheets linked to this Encounter. No Known Allergies    Prior to Visit Medications    Medication Sig Taking?  Authorizing Provider   candesartan (ATACAND) 32 MG tablet Take 1 tablet by mouth daily  Supa Mcallister MD   fosinopril (MONOPRIL) 40 MG tablet TAKE 1 TABLET BY MOUTH DAILY FOR BLOOD PRESSURE  Supa Mcallister MD   hydroCHLOROthiazide (HYDRODIURIL) 25 MG tablet TAKE 1 TABLET BY MOUTH DAILY FOR BLOOD PRESSURE  Supa Mcallister MD   metoprolol tartrate (LOPRESSOR) 50 MG tablet TAKE 1 TABLET BY MOUTH TWICE DAILY  Supa Mcallister MD   amLODIPine-atorvastatatin (CADUET) 10-10 MG per tablet TAKE 1 TABLET BY MOUTH EVERY DAY  Supa Mcallister MD   potassium chloride (KLOR-CON M) 20 MEQ extended release tablet TAKE 1 TABLET BY MOUTH TWICE DAILY  Supa Mcallister MD   tadalafil (CIALIS) 20 MG tablet Take 1 capsule by mouth daily as needed for Erectile Dysfunction  Supa Mcallister MD   meloxicam (MOBIC) 7.5 MG tablet Take 2 tablets by mouth daily as needed for Pain  Supa Mcallister MD   tadalafil (CIALIS) 20 MG tablet Take 1 tablet by mouth daily as needed for Erectile Dysfunction  MD June Roy MISC by Does not apply route Diagnosis: arthritis. Expires: 1/23/22. [place] Naylor MD   meloxicam (MOBIC) 7.5 MG tablet Take 1 tablet by mouth daily as needed for Pain  C[place] Naylor MD   potassium chloride (KLOR-CON M) 20 MEQ extended release tablet TAKE 1 TABLET BY MOUTH TWICE DAILY  Cleveland Naylor MD   potassium chloride (KLOR-CON M) 20 MEQ TBCR extended release tablet TAKE 1 TABLET BY MOUTH TWICE DAILY  Cleveland Naylor MD   tadalafil (CIALIS) 20 MG tablet Take 1 tablet by mouth as needed for Erectile Dysfunction  Cleveland Naylor MD   fosinopril (MONOPRIL) 40 MG tablet TAKE 1 TABLET BY MOUTH EVERY DAY FOR BLOOD PRESSURE  Cleveland Naylor MD   aspirin 81 MG EC tablet Take 81 mg by mouth every 48 hours as needed.   Historical Provider, MD       Past Medical History:   Diagnosis Date    Back pain     Dyslipidemia     ED (erectile dysfunction)     HTN (hypertension)     Obesities     Vitamin D deficiency        Past Surgical History:   Procedure Laterality Date    CYSTOSCOPY      [facility]PAIR Right [facility]    [facility]      KNEE SURGERY      [place] LAP,INGUINAL HERNIA REPR,RECUR N/A 10/9/2018    LAPAROSCOPIC REPAIR OF RECURRENT RIGHT INGUINAL HERNIA performed by Ai Azul MD at [address] [facility]         Family History   Problem Relation Age of Onset    High Blood Pressure Mother     Heart Disease Father     High Blood Pressure Father        CareTeam (Including outside providers/suppliers regularly involved in providing care):   Patient Care Team:  C[place] Naylor MD as PCP - General (Internal Medicine)  [place] Naylor MD as [facility]CP - [facility] Empaneled Provider  Ai Azul MD as Surgeon (General Surgery)    Wt Readings from Last 3 Encounters:   09/25/20 250 lb 6.4 oz (113.6 kg)   01/23/20 247 lb (112 kg)   10/09/19 242 lb (109.8 kg)     Vitals:    09/25/20 0917   BP: 136/86   Site: Right Upper Arm   Position: Sitting   Cuff Size: Medium Adult   Pulse: 87   Temp: 97.1 °F (36.2 °C)   SpO2: 97%   Weight: 250 lb 6.4 oz (113.6 kg)   Height: 5' 9\" (1.753 m)     Body mass index is 36.98 kg/m². Based upon direct observation of the patient, evaluation of cognition reveals memory and cognition are intact. Created clock with correct time without problem. Patient's complete Health Risk Assessment and screening values have been reviewed and are found in Flowsheets. The following problems were reviewed today and where indicated follow up appointments were made and/or referrals ordered. Positive Risk Factor Screenings with Interventions:     Substance History:  Social History     Tobacco History     Smoking Status  Heavy Tobacco Smoker Last attempt to quit  1/1/2001 Smoking Frequency  0.5 packs/day for 5 years (2.5 pk yrs) Smoking Tobacco Type  Cigars    Smokeless Tobacco Use  Never Used          Alcohol History     Alcohol Use Status  Yes          Drug Use     Drug Use Status  No          Sexual Activity     Sexually Active  Not Asked               Alcohol Screening: Audit-C Score: 4  Total Score: 4    A score of 8 or more is associated with harmful or hazardous drinking. A score of 13 or more in women, and 15 or more in men, is likely to indicate alcohol dependence. Substance Abuse Interventions:  · Cigar smoking. Infrequent. Counseled. General Health:  General  In general, how would you say your health is?: Fair  In the past 7 days, have you experienced any of the following? New or Increased Pain, New or Increased Fatigue, Loneliness, Social Isolation, Stress or Anger?: None of These  Do you get the social and emotional support that you need?: Yes  Do you have a Living Will?: (!) No  General Health Risk Interventions:  · References given  · Plans to work on documents.      Health Habits/Nutrition:  Health Habits/Nutrition  Do you exercise for at least 20 minutes 2-3 times per week?: Yes  Have you lost any weight without trying in the past 3 months?: No  Do you eat fewer than 2 meals per day?: No  Have you seen a Services Due: see orders and patient instructions/AVS.  . Recommended screening schedule for the next 5-10 years is provided to the patient in written form: see Patient Instructions/AVS.    HTN: continue same med regimen. DASH diet discussed. HLD: heart healthy diet and statin compliance discussed. IGT: exercises, diet and weight reduction discussed.

## 2020-09-25 NOTE — PATIENT INSTRUCTIONS
Learning About Medical Power of   What is a medical power of ? A medical power of , also called a durable power of  for health care, is one type of the legal forms called advance directives. It lets you name the person you want to make treatment decisions for you if you can't speak or decide for yourself. The person you choose is called your health care agent. This person is also called a health care proxy or health care surrogate. A medical power of  may be called something else in your state. How do you choose a health care agent? Choose your health care agent carefully. This person may or may not be a family member. Talk to the person before you make your final decision. Make sure he or she is comfortable with this responsibility. It's a good idea to choose someone who:  · Is at least 25years old. · Knows you well and understands what makes life meaningful for you. · Understands your Scientology and moral values. · Will do what you want, not what he or she wants. · Will be able to make difficult choices at a stressful time. · Will be able to refuse or stop treatment, if that is what you would want, even if you could die. · Will be firm and confident with health professionals if needed. · Will ask questions to get needed information. · Lives near you or agrees to travel to you if needed. Your family may help you make medical decisions while you can still be part of that process. But it's important to choose one person to be your health care agent in case you aren't able to make decisions for yourself. If you don't fill out the legal form and name a health care agent, the decisions your family can make may be limited. A health care agent may be called something else in your state. Who will make decisions for you if you don't have a health care agent? If you don't have a health care agent or a living will, you may not get the care you want. Decisions may be made by family members who disagree about your medical care. Or decisions may be made by a medical professional who doesn't know you well. In some cases, a  makes the decisions. When you name a health care agent, it is very clear who has the power to make health decisions for you. How do you name a health care agent? You name your health care agent on a legal form. This form is usually called a medical power of . Ask your hospital, state bar association, or office on aging where to find these forms. You must sign the form to make it legal. Some states require you to get the form notarized. This means that a person called a  watches you sign the form and then he or she signs the form. Some states also require that two or more witnesses sign the form. Be sure to tell your family members and doctors who your health care agent is. Where can you learn more? Go to https://PrimeStonepepiceweb.Oris4. org and sign in to your Wercker account. Enter [id number] in the Dlyte.[employer] box to learn more about \"Learning About [address]. \"     If you do not have an account, please click on the \"Sign Up Now\" link. Current as of: December 9, 2019               Content Version: 12.5  © 6746-0608 Healthwise, Incorporated. Care instructions adapted under license by [facility] ([facility]). If you have questions about a medical condition or this instruction, always ask your healthcare professional. [place] [address] any warranty or liability for your use of this information. Learning About Living Raji Kang  What is a living will? A living will, also called a declaration, is a legal form. It tells your family and your doctor your wishes when you can't speak for yourself. It's used by the health professionals who will treat you as you near the end of your life or if you get seriously hurt or ill.   If you put your wishes in writing, your loved ones and others will know what kind of care you want. They won't need to guess. This can ease your mind and be helpful to others. And you can change or cancel your living will at any time. A living will is not the same as an estate or property will. An estate will explains what you want to happen with your money and property after you die. How do you use it? A living will is used to describe the kinds of treatment or life support you want as you near the end of your life or if you get seriously hurt or ill. Keep these facts in mind about living negrete. · Your living will is used only if you can't speak or make decisions for yourself. Most often, one or more doctors must certify that you can't speak or decide for yourself before your living will takes effect. · If you get better and can speak for yourself again, you can accept or refuse any treatment. It doesn't matter what you said in your living will. · Some states may limit your right to refuse treatment in certain cases. For example, you may need to clearly state in your living will that you don't want artificial hydration and nutrition, such as being fed through a tube. Is a living will a legal document? A living will is a legal document. Each state has its own laws about living negrete. And a living will may be called something else in your state. Here are some things to know about living negrete. · You don't need an  to complete a living will. But legal advice can be helpful if your state's laws are unclear. It can also help if your health history is complicated or your family can't agree on what should be in your living will. · You can change your living will at any time. Some people find that their wishes about end-of-life care change as their health changes. If you make big changes to your living will, complete a new form. · If you move to another state, make sure that your living will is legal in the state where you now live.  In most cases, doctors will respect your wishes even if you have a form from a different state. · You might use a universal form that has been approved by many states. This kind of form can sometimes be filled out and stored online. Your digital copy will then be available wherever you have a connection to the internet. The doctors and nurses who need to treat you can find it right away. · Your state may offer an online registry. This is another place where you can store your living will online. · It's a good idea to get your living will notarized. This means using a person called a [employer] to watch two people sign, or witness, your living will. What should you know when you create a living will? Here are some questions to ask yourself as you make your living will:  · Do you know enough about life support methods that might be used? If not, talk to your doctor so you know what might be done if you can't breathe on your own, your heart stops, or you can't swallow. · What things would you still want to be able to do after you receive life-support methods? Would you want to be able to walk? To speak? To eat on your own? To live without the help of machines? · Do you want certain Gnosticist practices performed if you become very ill? · If you have a choice, where do you want to be cared for? In your home? At a hospital or nursing home? · If you have a choice at the end of your life, where would you prefer to die? At home? In a hospital or nursing home? Somewhere else? · Would you prefer to be buried or cremated? · Do you want your organs to be donated after you die? What should you do with your living will? · Make sure that your family members and your health care agent have copies of your living will (also called a declaration). · Give your doctor a copy of your living will. Ask him or her to keep it as part of your medical record. If you have more than one doctor, make sure that each one has a copy.   · Put a copy of your living will where it can be easily found. For example, some people may put a copy on their refrigerator door. If you are using a digital copy, be sure your doctor, family members, and health care agent know how to find and access it. Where can you learn more? Go to https://chpepiceweb.healthAmpio [email or web address] org and sign in to your SimpleHoney account. Enter S521 in the K[employer] box to learn more about \"Learning About Living [place]. \"     If you do not have an account, please click on the \"Sign Up Now\" link. Current as of: December 9, 2019               Content Version: 12.5  © 0240-5973 Healthwise, [employer]. Care instructions adapted under license by [facility] [place] ([place] [facility]). If you have questions about a medical condition or this instruction, always ask your healthcare professional. [address] any warranty or liability for your use of this information. Learning About Healthy Weight  What is a healthy weight? A healthy weight is the weight at which you feel good about yourself and have energy for work and play. It's also one that lowers your risk for health problems. What can you do to stay at a healthy weight? It can be hard to stay at a healthy weight, especially when fast food, vending-machine snacks, and processed foods are so easy to find. And with your busy lifestyle, activity may be low on your list of things to do. But staying at a healthy weight may be easier than you think. Here are some dos and don'ts for staying at a healthy weight:  Do eat healthy foods  The kinds of foods you eat have a big impact on both your weight and your health. Reaching and staying at a healthy weight is not about going on a diet. It's about making healthier food choices every day and changing your diet for good. Healthy eating means eating a variety of foods so that you get all the nutrients you need. Your body needs protein, carbohydrate, and fats for energy.  They keep your heart beating, your brain active, and your muscles working. On most days, try to eat from each food group. This means eating a variety of:  · Whole grains, such as whole wheat breads and pastas. · Fruits and vegetables. · Dairy products, such as low-fat milk, yogurt, and cheese. · Lean proteins, such as all types of fish, chicken without the skin, and beans. Don't have too much or too little of one thing. All foods, if eaten in moderation, can be part of healthy eating. Even sweets can be okay. If your favorite foods are high in fat, salt, sugar, or calories, limit how often you eat them. Eat smaller servings, or look for healthy substitutes. Do watch what you eat  Many people eat more than their bodies need. Part of staying at a healthy weight means learning how much food you really need from day to day and not eating more than that. Even with healthy foods, eating too much can make you gain weight. Having a well-balanced diet means that you eat enough, but not too much, and that your food gives you the nutrients you need to stay healthy. So listen to your body. Eat when you're hungry. Stop when you feel satisfied. It's a good idea to have healthy snacks ready for when you get hungry. Keep healthy snacks with you at work, in your car, and at home. If you have a healthy snack easily available, you'll be less likely to pick a candy bar or bag of chips from a vending machine instead. Some healthy snacks you might want to keep on hand are fruit, low-fat yogurt, string cheese, low-fat microwave popcorn, raisins and other dried fruit, nuts, whole wheat crackers, pretzels, carrots, celery sticks, and broccoli. Do some physical activity   A big part of reaching and staying at a healthy weight is being active. When you're active, you burn calories. This makes it easier to reach and stay at a healthy weight.  When you're active on a regular basis, your body burns more calories, even when you're at rest. Being active helps you lose fat and build lean muscle. Try to be active for at least 1 hour every day. This may sound like a lot, but it's okay to be active in smaller blocks of time that add up to 1 hour a day. Any activity that makes your heart beat faster and keeps it there for a while counts. A brisk walk, run, or swim will get your heart beating faster. So will climbing stairs, shooting baskets, or cycling. Even some household chores like vacuuming and mowing the lawn will get your heart rate up. Pick activities that you enjoy--ones that make your heart beat faster, your muscles stronger, and your muscles and joints more flexible. If you find more than one thing you like doing, do them all. You don't have to do the same thing every day. Don't diet  Diets don't work. Diets are temporary. Because you give up so much when you diet, you may be hungry and think about food all the time. And after you stop dieting, you also may overeat to make up for what you missed. Most people who diet end up gaining back the pounds they lost--and more. Remember that healthy bodies come in lots of shapes and sizes. Everyone can get healthier by eating better and being more active. Where can you learn more? Go to https://Tyro Payments.Trak. org and sign in to your GiftMe account. Enter 295 [id number] in the [employer]) box to learn more about \"Learning About Healthy Weight. \"     If you do not have an account, please click on the \"Sign Up Now\" link. Current as of: December 11, 2019               Content Version: 12.5  © 3651-9501 Healthwise, Incorporated. Care instructions adapted under license by [facility] ([facility]). If you have questions about a medical condition or this instruction, always ask your healthcare professional. [place] [address] any warranty or liability for your use of this information.            Eating Healthy Foods: Care Instructions  Your Care Instructions     Eating healthy foods can help lower your risk for disease. Healthy food gives you energy and keeps your heart strong, your brain active, your muscles working, and your bones strong. A healthy diet includes a variety of foods from the basic food groups: grains, vegetables, fruits, milk and milk products, and meat and beans. Some people may eat more of their favorite foods from only one food group and, as a result, miss getting the nutrients they need. So, it is important to pay attention not only to what you eat but also to what you are missing from your diet. You can eat a healthy, balanced diet by making a few small changes. Follow-up care is a key part of your treatment and safety. Be sure to make and go to all appointments, and call your doctor if you are having problems. It's also a good idea to know your test results and keep a list of the medicines you take. How can you care for yourself at home? Look at what you eat  · Keep a food diary for a week or two and record everything you eat or drink. Track the number of servings you eat from each food group. · For a balanced diet every day, eat a variety of:  ? 6 or more ounce-equivalents of grains, such as cereals, breads, crackers, rice, or pasta, every day. An ounce-equivalent is 1 slice of bread, 1 cup of ready-to-eat cereal, or ½ cup of cooked rice, cooked pasta, or cooked cereal.  ? 2½ cups of vegetables, especially:  § Dark-green vegetables such as broccoli and spinach. § Orange vegetables such as carrots and sweet potatoes. § Dry beans (such as wells and kidney beans) and peas (such as lentils). ? 2 cups of fresh, frozen, or canned fruit. A small apple or 1 banana or orange equals 1 cup. ? 3 cups of nonfat or low-fat milk, yogurt, or other milk products. ? 5½ ounces of meat and beans, such as chicken, fish, lean meat, beans, nuts, and seeds. One egg, 1 tablespoon of peanut butter, ½ ounce nuts or seeds, or ¼ cup of cooked beans equals 1 ounce of meat.   · Learn how to read food labels for serving sizes and ingredients. Fast-food and convenience-food meals often contain few or no fruits or vegetables. Make sure you eat some fruits and vegetables to make the meal more nutritious. · Look at your food diary. For each food group, add up what you have eaten and then divide the total by the number of days. This will give you an idea of how much you are eating from each food group. See if you can find some ways to change your diet to make it more healthy. Start small  · Do not try to make dramatic changes to your diet all at once. You might feel that you are missing out on your favorite foods and then be more likely to fail. · Start slowly, and gradually change your habits. Try some of the following:  ? Use whole wheat bread instead of white bread. ? Use nonfat or low-fat milk instead of whole milk. ? Eat brown rice instead of white rice, and eat whole wheat pasta instead of white-flour pasta. ? Try low-fat cheeses and low-fat yogurt. ? Add more fruits and vegetables to meals and have them for snacks. ? Add lettuce, tomato, cucumber, and onion to sandwiches. ? Add fruit to yogurt and cereal.  Enjoy food  · You can still eat your favorite foods. You just may need to eat less of them. If your favorite foods are high in fat, salt, and sugar, limit how often you eat them, but do not cut them out entirely. · Eat a wide variety of foods. Make healthy choices when eating out  · The type of restaurant you choose can help you make healthy choices. Even fast-food chains are now offering more low-fat or healthier choices on the menu. · Choose smaller portions, or take half of your meal home. · When eating out, try:  ? A veggie pizza with a whole wheat crust or grilled chicken (instead of sausage or pepperoni). ? Pasta with roasted vegetables, grilled chicken, or marinara sauce instead of cream sauce. ? A vegetable wrap or grilled chicken wrap. ?  Broiled or poached food instead of fried or breaded items. Make healthy choices easy  · Buy packaged, prewashed, ready-to-eat fresh vegetables and fruits, such as baby carrots, salad mixes, and chopped or shredded broccoli and cauliflower. · Buy packaged, presliced fruits, such as melon or pineapple. · Choose 100% fruit or vegetable juice instead of soda. Limit juice intake to 4 to 6 oz (½ to ¾ cup) a day. · Blend low-fat yogurt, fruit juice, and canned or frozen fruit to make a smoothie for breakfast or a snack. Where can you learn more? Go to https://roomlinxpepiceweb.Carbon Salon. org and sign in to your Perkville account. Enter S978 in the [employer] box to learn more about \"Eating Healthy Foods: Care Instructions. \"     If you do not have an account, please click on the \"Sign Up Now\" link. Current as of: August 22, 2019               Content Version: 12.5  © 0064-8708 Healthwise, [employer]. Care instructions adapted under license by [facility] ([facility]). If you have questions about a medical condition or this instruction, always ask your healthcare professional. [place] [address] any warranty or liability for your use of this information. Personalized Preventive Plan for [place] - 9/25/2020  Medicare offers a range of preventive health benefits. Some of the tests and screenings are paid in full while other may be subject to a deductible, co-insurance, and/or copay. Some of these benefits include a comprehensive review of your medical history including lifestyle, illnesses that may run in your family, and various assessments and screenings as appropriate. After reviewing your medical record and screening and assessments performed today your provider may have ordered immunizations, labs, imaging, and/or referrals for you. A list of these orders (if applicable) as well as your Preventive Care list are included within your After Visit Summary for your review.     Other Preventive Recommendations:    · A preventive eye exam performed by an eye specialist is recommended every 1-2 years to screen for glaucoma; cataracts, macular degeneration, and other eye disorders. · A preventive dental visit is recommended every 6 months. · Try to get at least 150 minutes of exercise per week or 10,000 steps per day on a pedometer . · Order or download the FREE \"Exercise & Physical Activity: Your Everyday Guide\" from [employer]. Call 3-152.927.9307 or search [employer] online. · You need 1717-4544 mg of calcium and 8940-3063 IU of vitamin D per day. It is possible to meet your calcium requirement with diet alone, but a vitamin D supplement is usually necessary to meet this goal.  · When exposed to the sun, use a sunscreen that protects against both UVA and UVB radiation with an SPF of 30 or greater. Reapply every 2 to 3 hours or after sweating, drying off with a towel, or swimming. · Always wear a seat belt when traveling in a car. Always wear a helmet when riding a bicycle or motorcycle.

## 2020-10-07 PROCEDURE — G0008 ADMIN INFLUENZA VIRUS VAC: HCPCS | Performed by: INTERNAL MEDICINE

## 2020-10-07 PROCEDURE — 90653 IIV ADJUVANT VACCINE IM: CPT | Performed by: INTERNAL MEDICINE

## 2021-01-19 RX ORDER — POTASSIUM CHLORIDE 20 MEQ/1
20 TABLET, EXTENDED RELEASE ORAL DAILY
Qty: 90 TABLET | Refills: 2 | Status: SHIPPED | OUTPATIENT
Start: 2021-01-19 | End: 2021-01-20

## 2021-01-20 ENCOUNTER — TELEPHONE (OUTPATIENT)
Dept: INTERNAL MEDICINE CLINIC | Age: 71
End: 2021-01-20

## 2021-01-20 RX ORDER — POTASSIUM CHLORIDE 20 MEQ/1
20 TABLET, EXTENDED RELEASE ORAL 2 TIMES DAILY
Qty: 180 TABLET | Refills: 2 | Status: SHIPPED | OUTPATIENT
Start: 2021-01-20 | End: 2021-03-02 | Stop reason: SDUPTHER

## 2021-01-20 NOTE — TELEPHONE ENCOUNTER
Patient informed the office that the prescription for potassium was written incorrectly. The prescription should indicate take 2 daily. The prescription indicates to take 1 daily.

## 2021-03-02 RX ORDER — POTASSIUM CHLORIDE 20 MEQ/1
20 TABLET, EXTENDED RELEASE ORAL 2 TIMES DAILY
Qty: 180 TABLET | Refills: 2 | Status: SHIPPED | OUTPATIENT
Start: 2021-03-02 | End: 2021-12-06 | Stop reason: SDUPTHER

## 2021-03-02 RX ORDER — CANDESARTAN 32 MG/1
32 TABLET ORAL DAILY
Qty: 30 TABLET | Refills: 5 | Status: SHIPPED | OUTPATIENT
Start: 2021-03-02 | End: 2021-08-25

## 2021-04-05 RX ORDER — AMLODIPINE BESYLATE AND ATORVASTATIN CALCIUM 10; 10 MG/1; MG/1
TABLET, FILM COATED ORAL
Qty: 90 TABLET | Refills: 3 | Status: SHIPPED | OUTPATIENT
Start: 2021-04-05 | End: 2022-03-29 | Stop reason: SDUPTHER

## 2021-05-18 RX ORDER — METOPROLOL TARTRATE 50 MG/1
TABLET, FILM COATED ORAL
Qty: 180 TABLET | Refills: 3 | Status: SHIPPED | OUTPATIENT
Start: 2021-05-18 | End: 2022-05-09

## 2021-05-18 RX ORDER — FOSINOPRIL SODIUM 40 MG/1
TABLET ORAL
Qty: 90 TABLET | Refills: 3 | Status: SHIPPED | OUTPATIENT
Start: 2021-05-18 | End: 2021-06-02

## 2021-05-18 RX ORDER — HYDROCHLOROTHIAZIDE 25 MG/1
TABLET ORAL
Qty: 90 TABLET | Refills: 3 | Status: SHIPPED | OUTPATIENT
Start: 2021-05-18 | End: 2022-05-09

## 2021-06-02 ENCOUNTER — OFFICE VISIT (OUTPATIENT)
Dept: INTERNAL MEDICINE CLINIC | Age: 71
End: 2021-06-02
Payer: MEDICARE

## 2021-06-02 VITALS
OXYGEN SATURATION: 97 % | SYSTOLIC BLOOD PRESSURE: 125 MMHG | WEIGHT: 244.2 LBS | DIASTOLIC BLOOD PRESSURE: 85 MMHG | HEART RATE: 76 BPM | BODY MASS INDEX: 34.19 KG/M2 | HEIGHT: 71 IN

## 2021-06-02 DIAGNOSIS — Z01.818 PRE-OP EXAMINATION: ICD-10-CM

## 2021-06-02 DIAGNOSIS — E78.2 MIXED HYPERLIPIDEMIA: ICD-10-CM

## 2021-06-02 DIAGNOSIS — Z01.818 PRE-OP EXAMINATION: Primary | ICD-10-CM

## 2021-06-02 DIAGNOSIS — I10 ESSENTIAL HYPERTENSION: ICD-10-CM

## 2021-06-02 DIAGNOSIS — Z86.69 HISTORY OF CATARACT: ICD-10-CM

## 2021-06-02 DIAGNOSIS — Z12.11 COLON CANCER SCREENING: ICD-10-CM

## 2021-06-02 LAB
ANION GAP SERPL CALCULATED.3IONS-SCNC: 15 MMOL/L (ref 3–16)
BUN BLDV-MCNC: 21 MG/DL (ref 7–20)
CALCIUM SERPL-MCNC: 9.2 MG/DL (ref 8.3–10.6)
CHLORIDE BLD-SCNC: 104 MMOL/L (ref 99–110)
CO2: 26 MMOL/L (ref 21–32)
CREAT SERPL-MCNC: 1.4 MG/DL (ref 0.8–1.3)
GFR AFRICAN AMERICAN: >60
GFR NON-AFRICAN AMERICAN: 50
GLUCOSE BLD-MCNC: 140 MG/DL (ref 70–99)
POTASSIUM SERPL-SCNC: 3.6 MMOL/L (ref 3.5–5.1)
SODIUM BLD-SCNC: 145 MMOL/L (ref 136–145)

## 2021-06-02 PROCEDURE — G8427 DOCREV CUR MEDS BY ELIG CLIN: HCPCS | Performed by: INTERNAL MEDICINE

## 2021-06-02 PROCEDURE — G8417 CALC BMI ABV UP PARAM F/U: HCPCS | Performed by: INTERNAL MEDICINE

## 2021-06-02 PROCEDURE — 3017F COLORECTAL CA SCREEN DOC REV: CPT | Performed by: INTERNAL MEDICINE

## 2021-06-02 PROCEDURE — 4004F PT TOBACCO SCREEN RCVD TLK: CPT | Performed by: INTERNAL MEDICINE

## 2021-06-02 PROCEDURE — 1123F ACP DISCUSS/DSCN MKR DOCD: CPT | Performed by: INTERNAL MEDICINE

## 2021-06-02 PROCEDURE — 4040F PNEUMOC VAC/ADMIN/RCVD: CPT | Performed by: INTERNAL MEDICINE

## 2021-06-02 PROCEDURE — 99214 OFFICE O/P EST MOD 30 MIN: CPT | Performed by: INTERNAL MEDICINE

## 2021-06-02 SDOH — ECONOMIC STABILITY: FOOD INSECURITY: WITHIN THE PAST 12 MONTHS, THE FOOD YOU BOUGHT JUST DIDN'T LAST AND YOU DIDN'T HAVE MONEY TO GET MORE.: NEVER TRUE

## 2021-06-02 SDOH — EDUCATIONAL SECURITY: EDUCATION ATTAINMENT: WHAT IS THE HIGHEST LEVEL OF SCHOOL YOU HAVE COMPLETED OR THE HIGHEST DEGREE YOU HAVE RECEIVED?: PATIENT REFUSED

## 2021-06-02 SDOH — ECONOMIC STABILITY: INCOME INSECURITY: HOW HARD IS IT FOR YOU TO PAY FOR THE VERY BASICS LIKE FOOD, HOUSING, MEDICAL CARE, AND HEATING?: NOT VERY HARD

## 2021-06-02 SDOH — ECONOMIC STABILITY: FOOD INSECURITY: WITHIN THE PAST 12 MONTHS, YOU WORRIED THAT YOUR FOOD WOULD RUN OUT BEFORE YOU GOT MONEY TO BUY MORE.: NEVER TRUE

## 2021-06-02 ASSESSMENT — SOCIAL DETERMINANTS OF HEALTH (SDOH): HOW HARD IS IT FOR YOU TO PAY FOR THE VERY BASICS LIKE FOOD, HOUSING, MEDICAL CARE, AND HEATING?: NOT HARD AT ALL

## 2021-06-02 ASSESSMENT — PATIENT HEALTH QUESTIONNAIRE - PHQ9
2. FEELING DOWN, DEPRESSED OR HOPELESS: 0
SUM OF ALL RESPONSES TO PHQ9 QUESTIONS 1 & 2: 0
SUM OF ALL RESPONSES TO PHQ QUESTIONS 1-9: 0
1. LITTLE INTEREST OR PLEASURE IN DOING THINGS: 0

## 2021-06-26 NOTE — PROGRESS NOTES
Patient: Linda Castellon is a 65 y.o. male who presents today with the following Chief Complaint(s):    Chief Complaint   Patient presents with    Pre-op Exam         HPIHe is here for pre op examination, scheduled for cataract eye surgery. He has had progressive decrease in visual acuity. Especially night vision. He denies exertional CP or SOB. No recent exposure to infectious agents. Current Outpatient Medications   Medication Sig Dispense Refill    metoprolol tartrate (LOPRESSOR) 50 MG tablet TAKE 1 TABLET BY MOUTH TWICE DAILY 180 tablet 3    hydroCHLOROthiazide (HYDRODIURIL) 25 MG tablet TAKE 1 TABLET BY MOUTH DAILY FOR BLOOD PRESSURE 90 tablet 3    amLODIPine-atorvastatatin (CADUET) 10-10 MG per tablet TAKE 1 TABLET BY MOUTH EVERY DAY 90 tablet 3    candesartan (ATACAND) 32 MG tablet Take 1 tablet by mouth daily 30 tablet 5    potassium chloride (KLOR-CON M) 20 MEQ extended release tablet Take 1 tablet by mouth 2 times daily 180 tablet 2    rosuvastatin (CRESTOR) 10 MG tablet Take 1 tablet by mouth nightly 90 tablet 3    tadalafil (CIALIS) 20 MG tablet Take 1 tablet by mouth daily as needed for Erectile Dysfunction 30 tablet 5    tadalafil (CIALIS) 20 MG tablet Take 1 capsule by mouth daily as needed for Erectile Dysfunction 30 tablet 1    meloxicam (MOBIC) 7.5 MG tablet Take 2 tablets by mouth daily as needed for Pain 60 tablet 5    Handicap Placard MISC by Does not apply route Diagnosis: arthritis. Expires: 1/23/22. 1 each 0    tadalafil (CIALIS) 20 MG tablet Take 1 tablet by mouth as needed for Erectile Dysfunction 30 tablet 5    aspirin 81 MG EC tablet Take 81 mg by mouth every 48 hours as needed. No current facility-administered medications for this visit. Patient's past medical history, surgical history, family history, medications,and allergies  were all reviewed and updated as appropriate today. Review of Systems see H and P in media.       Physical Exam see H and P in media. Vitals:    06/02/21 0952   BP: 125/85   Pulse: 76   SpO2: 97%       Assessment:   Diagnosis Orders   1. Pre-op examination  BASIC METABOLIC PANEL   2. History of cataract     3. Essential hypertension     4. Mixed hyperlipidemia     5. Colon cancer screening  JARAD - Rudy Loya MD, Gastroenterology, [facility]   6. Cardiopulmonary exam is stable. Cardiac risk is low. 7.      May proceed with planned eye surgery. Plan:  See plans above.

## 2021-08-25 RX ORDER — CANDESARTAN 32 MG/1
32 TABLET ORAL DAILY
Qty: 30 TABLET | Refills: 5 | Status: SHIPPED | OUTPATIENT
Start: 2021-08-25 | End: 2022-03-05 | Stop reason: SDUPTHER

## 2021-09-02 ENCOUNTER — ANESTHESIA EVENT (OUTPATIENT)
Dept: ENDOSCOPY | Age: 71
End: 2021-09-02
Payer: MEDICARE

## 2021-09-03 ENCOUNTER — HOSPITAL ENCOUNTER (OUTPATIENT)
Age: 71
Setting detail: OUTPATIENT SURGERY
Discharge: HOME | End: 2021-09-03
Attending: INTERNAL MEDICINE | Admitting: INTERNAL MEDICINE
Payer: MEDICARE

## 2021-09-03 ENCOUNTER — ANESTHESIA (OUTPATIENT)
Dept: ENDOSCOPY | Age: 71
End: 2021-09-03
Payer: MEDICARE

## 2021-09-03 VITALS
SYSTOLIC BLOOD PRESSURE: 99 MMHG | DIASTOLIC BLOOD PRESSURE: 69 MMHG | OXYGEN SATURATION: 96 % | RESPIRATION RATE: 18 BRPM

## 2021-09-03 VITALS
DIASTOLIC BLOOD PRESSURE: 94 MMHG | HEART RATE: 52 BPM | HEIGHT: 72 IN | BODY MASS INDEX: 33.86 KG/M2 | OXYGEN SATURATION: 100 % | TEMPERATURE: 97.1 F | SYSTOLIC BLOOD PRESSURE: 140 MMHG | WEIGHT: 250 LBS | RESPIRATION RATE: 18 BRPM

## 2021-09-03 DIAGNOSIS — Z12.11 SCREENING FOR COLON CANCER: ICD-10-CM

## 2021-09-03 PROCEDURE — 3700000000 HC ANESTHESIA ATTENDED CARE: Performed by: INTERNAL MEDICINE

## 2021-09-03 PROCEDURE — 7100000010 HC PHASE II RECOVERY - FIRST 15 MIN: Performed by: INTERNAL MEDICINE

## 2021-09-03 PROCEDURE — 2709999900 HC NON-CHARGEABLE SUPPLY: Performed by: INTERNAL MEDICINE

## 2021-09-03 PROCEDURE — 3609010300 HC COLONOSCOPY W/BIOPSY SINGLE/MULTIPLE: Performed by: INTERNAL MEDICINE

## 2021-09-03 PROCEDURE — 2500000003 HC RX 250 WO HCPCS: Performed by: NURSE ANESTHETIST, CERTIFIED REGISTERED

## 2021-09-03 PROCEDURE — 2580000003 HC RX 258: Performed by: NURSE ANESTHETIST, CERTIFIED REGISTERED

## 2021-09-03 PROCEDURE — 2580000003 HC RX 258: Performed by: ANESTHESIOLOGY

## 2021-09-03 PROCEDURE — 6360000002 HC RX W HCPCS: Performed by: NURSE ANESTHETIST, CERTIFIED REGISTERED

## 2021-09-03 PROCEDURE — 88305 TISSUE EXAM BY PATHOLOGIST: CPT

## 2021-09-03 PROCEDURE — 7100000011 HC PHASE II RECOVERY - ADDTL 15 MIN: Performed by: INTERNAL MEDICINE

## 2021-09-03 PROCEDURE — 3700000001 HC ADD 15 MINUTES (ANESTHESIA): Performed by: INTERNAL MEDICINE

## 2021-09-03 RX ORDER — SODIUM CHLORIDE, SODIUM LACTATE, POTASSIUM CHLORIDE, CALCIUM CHLORIDE 600; 310; 30; 20 MG/100ML; MG/100ML; MG/100ML; MG/100ML
INJECTION, SOLUTION INTRAVENOUS CONTINUOUS
Status: DISCONTINUED | OUTPATIENT
Start: 2021-09-03 | End: 2021-09-03 | Stop reason: HOSPADM

## 2021-09-03 RX ORDER — SODIUM CHLORIDE 0.9 % (FLUSH) 0.9 %
10 SYRINGE (ML) INJECTION EVERY 12 HOURS SCHEDULED
Status: DISCONTINUED | OUTPATIENT
Start: 2021-09-03 | End: 2021-09-03 | Stop reason: HOSPADM

## 2021-09-03 RX ORDER — LIDOCAINE HYDROCHLORIDE 20 MG/ML
INJECTION, SOLUTION EPIDURAL; INFILTRATION; INTRACAUDAL; PERINEURAL PRN
Status: DISCONTINUED | OUTPATIENT
Start: 2021-09-03 | End: 2021-09-03 | Stop reason: SDUPTHER

## 2021-09-03 RX ORDER — SODIUM CHLORIDE 9 MG/ML
INJECTION, SOLUTION INTRAVENOUS CONTINUOUS
Status: DISCONTINUED | OUTPATIENT
Start: 2021-09-03 | End: 2021-09-03 | Stop reason: HOSPADM

## 2021-09-03 RX ORDER — SODIUM CHLORIDE, SODIUM LACTATE, POTASSIUM CHLORIDE, CALCIUM CHLORIDE 600; 310; 30; 20 MG/100ML; MG/100ML; MG/100ML; MG/100ML
INJECTION, SOLUTION INTRAVENOUS CONTINUOUS PRN
Status: DISCONTINUED | OUTPATIENT
Start: 2021-09-03 | End: 2021-09-03 | Stop reason: SDUPTHER

## 2021-09-03 RX ORDER — SODIUM CHLORIDE 9 MG/ML
25 INJECTION, SOLUTION INTRAVENOUS PRN
Status: DISCONTINUED | OUTPATIENT
Start: 2021-09-03 | End: 2021-09-03 | Stop reason: HOSPADM

## 2021-09-03 RX ORDER — FOSINOPRIL SODIUM 40 MG/1
TABLET ORAL
COMMUNITY
Start: 2021-08-17 | End: 2022-04-25

## 2021-09-03 RX ORDER — POTASSIUM CHLORIDE 1500 MG/1
20 TABLET, FILM COATED, EXTENDED RELEASE ORAL 2 TIMES DAILY
COMMUNITY

## 2021-09-03 RX ORDER — SODIUM CHLORIDE 0.9 % (FLUSH) 0.9 %
10 SYRINGE (ML) INJECTION PRN
Status: DISCONTINUED | OUTPATIENT
Start: 2021-09-03 | End: 2021-09-03 | Stop reason: HOSPADM

## 2021-09-03 RX ORDER — PROPOFOL 10 MG/ML
INJECTION, EMULSION INTRAVENOUS PRN
Status: DISCONTINUED | OUTPATIENT
Start: 2021-09-03 | End: 2021-09-03 | Stop reason: SDUPTHER

## 2021-09-03 RX ADMIN — PROPOFOL 50 MG: 10 INJECTION, EMULSION INTRAVENOUS at 13:37

## 2021-09-03 RX ADMIN — PROPOFOL 50 MG: 10 INJECTION, EMULSION INTRAVENOUS at 13:25

## 2021-09-03 RX ADMIN — PROPOFOL 50 MG: 10 INJECTION, EMULSION INTRAVENOUS at 13:29

## 2021-09-03 RX ADMIN — PROPOFOL 50 MG: 10 INJECTION, EMULSION INTRAVENOUS at 13:33

## 2021-09-03 RX ADMIN — SODIUM CHLORIDE, SODIUM LACTATE, POTASSIUM CHLORIDE, AND CALCIUM CHLORIDE: .6; .31; .03; .02 INJECTION, SOLUTION INTRAVENOUS at 13:17

## 2021-09-03 RX ADMIN — SODIUM CHLORIDE, POTASSIUM CHLORIDE, SODIUM LACTATE AND CALCIUM CHLORIDE: 600; 310; 30; 20 INJECTION, SOLUTION INTRAVENOUS at 11:22

## 2021-09-03 RX ADMIN — PROPOFOL 50 MG: 10 INJECTION, EMULSION INTRAVENOUS at 13:21

## 2021-09-03 RX ADMIN — PROPOFOL 30 MG: 10 INJECTION, EMULSION INTRAVENOUS at 13:22

## 2021-09-03 RX ADMIN — PROPOFOL 50 MG: 10 INJECTION, EMULSION INTRAVENOUS at 13:41

## 2021-09-03 RX ADMIN — LIDOCAINE HYDROCHLORIDE 80 MG: 20 INJECTION, SOLUTION EPIDURAL; INFILTRATION; INTRACAUDAL; PERINEURAL at 13:21

## 2021-09-03 ASSESSMENT — PULMONARY FUNCTION TESTS
PIF_VALUE: 1
PIF_VALUE: 0
PIF_VALUE: 1
PIF_VALUE: 0
PIF_VALUE: 1

## 2021-09-03 ASSESSMENT — PAIN - FUNCTIONAL ASSESSMENT
PAIN_FUNCTIONAL_ASSESSMENT: 0-10
PAIN_FUNCTIONAL_ASSESSMENT: 0-10

## 2021-09-03 NOTE — ANESTHESIA PRE PROCEDURE
Dose Route Frequency Provider Last Rate Last Admin    sodium chloride flush 0.9 % injection 10 mL  10 mL IntraVENous 2 times per day Kiran Goes, DO        sodium chloride flush 0.9 % injection 10 mL  10 mL IntraVENous PRN Kiran Goes, DO        0.9 % sodium chloride infusion  25 mL IntraVENous PRN Kiran Goes, DO        0.9 % sodium chloride infusion   IntraVENous Continuous Kiran Goes, DO        lactated ringers infusion   IntraVENous Continuous Kiran Goes,  mL/hr at 21 1122 New Bag at 21 1122       Allergies:  No Known Allergies    Problem List:    Patient Active Problem List   Diagnosis Code    Hypertension I10    Dyslipidemia E78.5    Erectile dysfunction N52.9    Knee pain M25.569    Back pain M54.9    Right groin hernia K40.90       Past Medical History:        Diagnosis Date    Arthritis     knees, R hip    Back pain     Dyslipidemia     ED (erectile dysfunction)     HTN (hypertension)     Obesities     Vitamin D deficiency        Past Surgical History:        Procedure Laterality Date    CYSTOSCOPY      HERNIA REPAIR Right     [facility]      KNEE SURGERY      SC LAP,INGUINAL HERNIA REPR,RECUR N/A 10/9/2018    LAPAROSCOPIC REPAIR OF RECURRENT RIGHT INGUINAL HERNIA performed by Jaxson Taveras MD at [facility] History:    Social History     Tobacco Use    Smoking status: Heavy Tobacco Smoker     Packs/day: 0.50     Years: 5.00     Pack years: 2.50     Types: Cigars     Last attempt to quit: 2001     Years since quittin.6    Smokeless tobacco: Never Used   Substance Use Topics    Alcohol use:  Yes     Alcohol/week: 1.0 standard drinks     Types: 1 Shots of liquor per week     Comment: 1-2 daily                                Ready to quit: Not Answered  Counseling given: Not Answered      Vital Signs (Current):   Vitals:    21 1049   BP: 128/83   Pulse: 58   Resp: 16   Temp: 97.6 °F (36.4 °C)   TempSrc: Oral SpO2: 98%   Weight: 250 lb (113.4 kg)   Height: 6' (1.829 m)                                              BP Readings from Last 3 Encounters:   09/03/21 128/83   06/02/21 125/85   09/25/20 136/86       NPO Status: Time of last liquid consumption: 2100                        Time of last solid consumption: 1700                        Date of last liquid consumption: 09/02/21                        Date of last solid food consumption: 09/01/21    BMI:   Wt Readings from Last 3 Encounters:   09/03/21 250 lb (113.4 kg)   06/02/21 244 lb 3.2 oz (110.8 kg)   09/25/20 250 lb 6.4 oz (113.6 kg)     Body mass index is 33.91 kg/m². CBC:   Lab Results   Component Value Date    WBC 5.9 09/25/2020    RBC 4.91 09/25/2020    HGB 13.6 09/25/2020    HCT 41.2 09/25/2020    MCV 84.0 09/25/2020    RDW 15.9 09/25/2020     09/25/2020       CMP:   Lab Results   Component Value Date     06/02/2021    K 3.6 06/02/2021     06/02/2021    CO2 26 06/02/2021    BUN 21 06/02/2021    CREATININE 1.4 06/02/2021    GFRAA >60 06/02/2021    GFRAA >60 03/13/2013    AGRATIO 1.5 09/25/2020    LABGLOM 50 06/02/2021    GLUCOSE 140 06/02/2021    PROT 7.2 09/25/2020    PROT 7.2 03/13/2013    CALCIUM 9.2 06/02/2021    BILITOT 0.4 09/25/2020    ALKPHOS 58 09/25/2020    AST 30 09/25/2020    ALT 36 09/25/2020       POC Tests: No results for input(s): POCGLU, POCNA, POCK, POCCL, POCBUN, POCHEMO, POCHCT in the last 72 hours.     Coags: No results found for: PROTIME, INR, APTT    HCG (If Applicable): No results found for: PREGTESTUR, PREGSERUM, HCG, HCGQUANT     ABGs: No results found for: PHART, PO2ART, SDM5SMB, JYG5YRF, BEART, S3ZHCEBG     Type & Screen (If Applicable):  No results found for: LABABO, LABRH    Drug/Infectious Status (If Applicable):  No results found for: HIV, HEPCAB    COVID-19 Screening (If Applicable): No results found for: COVID19        Anesthesia Evaluation  Patient summary reviewed no history of anesthetic complications:   Airway: Mallampati: I  TM distance: >3 FB   Neck ROM: full  Mouth opening: > = 3 FB Dental: normal exam         Pulmonary:                              Cardiovascular:    (+) hypertension:,                   Neuro/Psych:                ROS comment: LBP GI/Hepatic/Renal:             Endo/Other:    (+) : arthritis:., .                 Abdominal:             Vascular: Other Findings:             Anesthesia Plan      MAC     ASA 2       Induction: intravenous. Anesthetic plan and risks discussed with patient.                       David Goldberg MD   9/3/2021

## 2021-09-03 NOTE — H&P
History and Physical / Pre-Sedation Assessment    Patient:  Hero East   :   1950     Intended Procedure:  colonoscopy    HPI: h/o polyps    Past Medical History:   has a past medical history of Arthritis, Back pain, Dyslipidemia, ED (erectile dysfunction), HTN (hypertension), Obesities, and Vitamin D deficiency. Past Surgical History:   has a past surgical history that includes knee surgery; Vasectomy; Cystoscopy; hernia repair (Right, ); and pr lap,inguinal hernia repr,recur (N/A, 10/9/2018). Medications:  Prior to Admission medications    Medication Sig Start Date End Date Taking? Authorizing Provider   candesartan (ATACAND) 32 MG tablet TAKE 1 TABLET BY MOUTH DAILY 21  Yes Lory Dugan MD   metoprolol tartrate (LOPRESSOR) 50 MG tablet TAKE 1 TABLET BY MOUTH TWICE DAILY 21  Yes Lory Dugan MD   hydroCHLOROthiazide (HYDRODIURIL) 25 MG tablet TAKE 1 TABLET BY MOUTH DAILY FOR BLOOD PRESSURE 21  Yes Lory Dugan MD   amLODIPine-atorvastatatin (CADUET) 10-10 MG per tablet TAKE 1 TABLET BY MOUTH EVERY DAY 21  Yes Lory Dugan MD   tadalafil (CIALIS) 20 MG tablet Take 1 tablet by mouth daily as needed for Erectile Dysfunction 20  Yes Lory Dugan MD   tadalafil (CIALIS) 20 MG tablet Take 1 capsule by mouth daily as needed for Erectile Dysfunction 20  Yes Lory Dugan MD   potassium chloride (KLOR-CON M) 20 MEQ TBCR extended release tablet Take 20 mEq by mouth 2 times daily     Historical Provider, MD   fosinopril (MONOPRIL) 40 MG tablet TAKE 1 TABLET BY MOUTH DAILY FOR BLOOD PRESSURE 21   Historical Provider, MD   potassium chloride (KLOR-CON M) 20 MEQ extended release tablet Take 1 tablet by mouth 2 times daily 3/2/21 6/2/21  Lory Dugan MD   Handicap Placard MISC by Does not apply route Diagnosis: arthritis. Expires: 22. 20   Lory Dugan MD   aspirin 81 MG EC tablet Take 81 mg by mouth every 48 hours as needed.  5/27/10 Historical Provider, MD       Family History:  family history includes Cancer in his maternal grandfather and maternal grandmother; Heart Disease in his father; High Blood Pressure in his father and mother. Social History:   reports that he has been smoking cigars. He has a 2.50 pack-year smoking history. He has never used smokeless tobacco. He reports current alcohol use of about 1.0 standard drinks of alcohol per week. He reports that he does not use drugs. Allergies:  Patient has no known allergies. ROS:  twelve point system review was unremarkable except for above noted history. Nurses notes reviewed and agreed. Medications reviewed    Physical Exam:  Vital Signs: /83   Pulse 58   Temp 97.6 °F (36.4 °C) (Oral)   Resp 16   Ht 6' (1.829 m)   Wt 250 lb (113.4 kg)   SpO2 98%   BMI 33.91 kg/m²    Skin: normal  HEENT: normal  Neck: supple. No adenopathy. No thyromegaly. No JVD. Pulmonary:Normal  Cardiac:Normal  Abdomen:Normal  MS: normal  Neuro: normal  Ext: no edema. Pulses normal    Pre-Procedure Assessment / Plan:  ASA 2 - Patient with mild systemic disease with no functional limitations  Mallampati Airway Assessment:  Mallampati Class II - (soft palate, fauces & uvula are visible)  Level of Sedation Plan: Moderate sedation  Post Procedure plan: Return to same level of care    I assessed the patient and find that the patient is in satisfactory condition to proceed with the planned procedure and sedation plan. I have explained the risk, benefits, and alternatives to the procedure. The patient understands and agrees to proceed.   Yes    Shelton Mares MD  1:20 PM 9/3/2021

## 2021-09-03 NOTE — ANESTHESIA POSTPROCEDURE EVALUATION
Department of Anesthesiology  Postprocedure Note    Patient: Kathleen Gonzales  MRN: [id number]  YOB: 1950  Date of evaluation: 9/3/2021  Time:  2:24 PM     Procedure Summary     Date: 09/03/21 Room / Location: [place] [address] / [facility]    Anesthesia Start: 8471 Anesthesia Stop: 3050    Procedure: COLONOSCOPY WITH BIOPSY Diagnosis:       Screening for colon cancer      (Screening for colon cancer [Z12.11])    Surgeons: Sunitha Mark MD Responsible Provider: Suleiman Bauman MD    Anesthesia Type: MAC ASA Status: 2          Anesthesia Type: MAC    Jaskaran Phase I: Jaskaran Score: 10    Jaskaran Phase II: Jaskaran Score: 8    Last vitals: Reviewed and per EMR flowsheets.        Anesthesia Post Evaluation    Patient location during evaluation: bedside  Patient participation: complete - patient participated  Level of consciousness: awake  Airway patency: patent  Nausea & Vomiting: no nausea and no vomiting  Complications: no  Cardiovascular status: hemodynamically stable  Respiratory status: acceptable  Hydration status: stable

## 2021-09-03 NOTE — OP NOTE
[address]                [address]                                OPERATIVE REPORT    PATIENT NAME: Mirna Amato                    :        1950  MED REC NO:   [id number]                          ROOM:  ACCOUNT NO:   [de-identified]                           ADMIT DATE: 2021  PROVIDER:     Minnie Nguyen MD    DATE OF PROCEDURE:  2021    SURGEON:  Minnie Nguyen MD    INDICATION FOR PROCEDURE:  History of polyps. DESCRIPTION OF PROCEDURE:  With the patient in the left lateral position  and after IV Diprivan, the Olympus video colonoscope was inserted into  the rectum and carefully advanced to the cecum. 1 cm flat polyp noted  in the transverse colon. We removed it with a biopsy forceps. Careful  inspection revealed no other abnormality. Procedure was terminated  without complications. IMPRESSION:  Transverse colon polyp. EBL:  None. Thank you Dr. Selam Ireland. Trixie Santiago MD    D: 2021 14:00:38       T: 2021 17:20:15     [email or web address]  Job#: [id number]     Doc#: [id number]    CC:   MD Avelina Mullen MD

## 2021-09-03 NOTE — PROGRESS NOTES
Ambulatory Surgery/Procedure Discharge Note    Patient tolerated procedure well. Patient denies cramping, nausea or pain post procedure. Discharge instructions and education reviewed with patient and responsible adult. Written instructions and education provided at discharge. Patient discharged ambulatory in wheelchair to car, responsible adult to drive patient home. Vitals:    09/03/21 1411   BP: (!) 140/94   Pulse: 52   Resp: 18   Temp:    SpO2: 100%       In: 740 [P.O.:240; I.V.:500]  Out: -     Restroom use offered before discharge. Yes    Pain assessment:  none  Pain Level: 0        Patient discharged to home/self care.  Patient discharged via wheel chair by transporter to waiting family/S.O.       9/3/2021 2:26 PM

## 2022-02-22 DIAGNOSIS — N52.01 ERECTILE DYSFUNCTION DUE TO ARTERIAL INSUFFICIENCY: ICD-10-CM

## 2022-02-22 RX ORDER — TADALAFIL 20 MG/1
20 TABLET ORAL DAILY PRN
Qty: 30 TABLET | Refills: 5 | Status: SHIPPED | OUTPATIENT
Start: 2022-02-22

## 2022-03-08 RX ORDER — CANDESARTAN 32 MG/1
32 TABLET ORAL DAILY
Qty: 30 TABLET | Refills: 5 | Status: SHIPPED | OUTPATIENT
Start: 2022-03-08 | End: 2022-09-08 | Stop reason: SDUPTHER

## 2022-03-29 RX ORDER — AMLODIPINE BESYLATE AND ATORVASTATIN CALCIUM 10; 10 MG/1; MG/1
1 TABLET, FILM COATED ORAL DAILY
Qty: 90 TABLET | Refills: 3 | Status: SHIPPED | OUTPATIENT
Start: 2022-03-29 | End: 2022-04-03

## 2022-04-03 RX ORDER — AMLODIPINE BESYLATE AND ATORVASTATIN CALCIUM 10; 10 MG/1; MG/1
TABLET, FILM COATED ORAL
Qty: 90 TABLET | Refills: 3 | Status: SHIPPED | OUTPATIENT
Start: 2022-04-03

## 2022-04-14 ENCOUNTER — OFFICE VISIT (OUTPATIENT)
Dept: INTERNAL MEDICINE CLINIC | Age: 72
End: 2022-04-14
Payer: MEDICARE

## 2022-04-14 VITALS
TEMPERATURE: 97.3 F | SYSTOLIC BLOOD PRESSURE: 132 MMHG | HEART RATE: 65 BPM | OXYGEN SATURATION: 98 % | DIASTOLIC BLOOD PRESSURE: 86 MMHG | WEIGHT: 244 LBS | HEIGHT: 72 IN | BODY MASS INDEX: 33.05 KG/M2

## 2022-04-14 DIAGNOSIS — R73.03 PRE-DIABETES: Primary | ICD-10-CM

## 2022-04-14 DIAGNOSIS — Z13.6 ENCOUNTER FOR ABDOMINAL AORTIC ANEURYSM (AAA) SCREENING: ICD-10-CM

## 2022-04-14 DIAGNOSIS — E78.2 MIXED HYPERLIPIDEMIA: ICD-10-CM

## 2022-04-14 DIAGNOSIS — I10 PRIMARY HYPERTENSION: ICD-10-CM

## 2022-04-14 DIAGNOSIS — G56.01 CARPAL TUNNEL SYNDROME OF RIGHT WRIST: ICD-10-CM

## 2022-04-14 LAB
CHP ED QC CHECK: NORMAL
GLUCOSE BLD-MCNC: 142 MG/DL
HBA1C MFR BLD: 5.8 %

## 2022-04-14 PROCEDURE — 3017F COLORECTAL CA SCREEN DOC REV: CPT | Performed by: INTERNAL MEDICINE

## 2022-04-14 PROCEDURE — 4040F PNEUMOC VAC/ADMIN/RCVD: CPT | Performed by: INTERNAL MEDICINE

## 2022-04-14 PROCEDURE — 1123F ACP DISCUSS/DSCN MKR DOCD: CPT | Performed by: INTERNAL MEDICINE

## 2022-04-14 PROCEDURE — 99214 OFFICE O/P EST MOD 30 MIN: CPT | Performed by: INTERNAL MEDICINE

## 2022-04-14 PROCEDURE — 83036 HEMOGLOBIN GLYCOSYLATED A1C: CPT | Performed by: INTERNAL MEDICINE

## 2022-04-14 PROCEDURE — 4004F PT TOBACCO SCREEN RCVD TLK: CPT | Performed by: INTERNAL MEDICINE

## 2022-04-14 PROCEDURE — 82962 GLUCOSE BLOOD TEST: CPT | Performed by: INTERNAL MEDICINE

## 2022-04-14 PROCEDURE — G8427 DOCREV CUR MEDS BY ELIG CLIN: HCPCS | Performed by: INTERNAL MEDICINE

## 2022-04-14 PROCEDURE — G8417 CALC BMI ABV UP PARAM F/U: HCPCS | Performed by: INTERNAL MEDICINE

## 2022-04-14 PROCEDURE — 3288F FALL RISK ASSESSMENT DOCD: CPT | Performed by: INTERNAL MEDICINE

## 2022-04-14 ASSESSMENT — PATIENT HEALTH QUESTIONNAIRE - PHQ9
SUM OF ALL RESPONSES TO PHQ QUESTIONS 1-9: 0
2. FEELING DOWN, DEPRESSED OR HOPELESS: 0
SUM OF ALL RESPONSES TO PHQ QUESTIONS 1-9: 0
1. LITTLE INTEREST OR PLEASURE IN DOING THINGS: 0
SUM OF ALL RESPONSES TO PHQ9 QUESTIONS 1 & 2: 0
SUM OF ALL RESPONSES TO PHQ QUESTIONS 1-9: 0
SUM OF ALL RESPONSES TO PHQ QUESTIONS 1-9: 0

## 2022-04-14 NOTE — PROGRESS NOTES
Patient: Shirley Knox is a 66 y.o. male who presents today with the following Chief Complaint(s):    Chief Complaint   Patient presents with    Hypertension         HPIBACK AND KNEE ARTHRITIS. TO GET BACK IN THE GYM. Right hand numbness after typing for an hour. Has to rest it. Restart but stops < 1 hour. EMG. Cock up splints. Current Outpatient Medications   Medication Sig Dispense Refill    amLODIPine-atorvastatatin (CADUET) 10-10 MG per tablet TAKE 1 TABLET BY MOUTH EVERY DAY 90 tablet 3    candesartan (ATACAND) 32 MG tablet Take 1 tablet by mouth daily 30 tablet 5    tadalafil (CIALIS) 20 MG tablet Take 1 tablet by mouth daily as needed for Erectile Dysfunction 30 tablet 5    potassium chloride (KLOR-CON M) 20 MEQ TBCR extended release tablet Take 20 mEq by mouth 2 times daily       fosinopril (MONOPRIL) 40 MG tablet TAKE 1 TABLET BY MOUTH DAILY FOR BLOOD PRESSURE      metoprolol tartrate (LOPRESSOR) 50 MG tablet TAKE 1 TABLET BY MOUTH TWICE DAILY 180 tablet 3    hydroCHLOROthiazide (HYDRODIURIL) 25 MG tablet TAKE 1 TABLET BY MOUTH DAILY FOR BLOOD PRESSURE 90 tablet 3    Handicap Placard MISC by Does not apply route Diagnosis: arthritis. Expires: 1/23/22. 1 each 0    aspirin 81 MG EC tablet Take 81 mg by mouth every 48 hours as needed.  potassium chloride (KLOR-CON M) 20 MEQ extended release tablet Take 1 tablet by mouth 2 times daily 180 tablet 3     No current facility-administered medications for this visit. Patient's past medical history, surgical history, family history, medications,and allergies  were all reviewed and updated as appropriate today. Review of Systems      Physical Exam    Vitals:    04/14/22 1558   BP: 132/86   Pulse: 65   Temp: 97.3 °F (36.3 °C)   SpO2: 98%       Assessment:  Encounter Diagnosis   Name Primary?  Pre-diabetes Yes       Plan:  1.  Pre-diabetes  ***  - POCT Glucose  - POCT glycosylated hemoglobin (Hb A1C)

## 2022-04-14 NOTE — PATIENT INSTRUCTIONS
[facility] Laboratory Locations - No appointment necessary. @ indicates the location is open Saturdays in addition to Monday through Friday. Call your preferred location for test preparation, business hours and other information you need. [employer] accepts [employer]. [facility]    @ [place] [facility] S[facility]cs. 3 [facility] [address] [place] [address]. [address]   Ph: [phone] Located within [facility] Lab Svcs. [address]   Ph: [phone]  @ [facility] Lab Svcs. [address] [facility]   Ph: [phone]    [facility] Lab Svcs. [address]   Ph: [phone] @ [place] [facility] Svcs. [address]  Ph: [phone] @ [place] [facility] Lab Svcs. 8[address] [facility]. Reji Graham [facility], [facility]   Ph: [phone]    [place]   @ [facility] Lab Svcs. [address] [place], [place] [address]   Ph: [phone] [place] [facility] [facility] [address]  Ph: [address] Lab Svcs[address] [facility]   Ph: [phone] [facility]. Lab Svcs.   [address] [facility], [facility]   Ph: [phone]

## 2022-04-15 DIAGNOSIS — I10 PRIMARY HYPERTENSION: ICD-10-CM

## 2022-04-15 DIAGNOSIS — E78.2 MIXED HYPERLIPIDEMIA: ICD-10-CM

## 2022-04-15 LAB
BASOPHILS ABSOLUTE: 0 K/UL (ref 0–0.2)
BASOPHILS RELATIVE PERCENT: 0.9 %
BILIRUBIN URINE: NEGATIVE
BLOOD, URINE: ABNORMAL
CLARITY: ABNORMAL
COLOR: YELLOW
EOSINOPHILS ABSOLUTE: 0.2 K/UL (ref 0–0.6)
EOSINOPHILS RELATIVE PERCENT: 3.3 %
EPITHELIAL CELLS, UA: 2 /HPF (ref 0–5)
GLUCOSE URINE: NEGATIVE MG/DL
HCT VFR BLD CALC: 38.8 % (ref 40.5–52.5)
HEMOGLOBIN: 12.6 G/DL (ref 13.5–17.5)
HYALINE CASTS: 3 /LPF (ref 0–8)
KETONES, URINE: NEGATIVE MG/DL
LEUKOCYTE ESTERASE, URINE: ABNORMAL
LYMPHOCYTES ABSOLUTE: 1 K/UL (ref 1–5.1)
LYMPHOCYTES RELATIVE PERCENT: 21.2 %
MCH RBC QN AUTO: 27.5 PG (ref 26–34)
MCHC RBC AUTO-ENTMCNC: 32.5 G/DL (ref 31–36)
MCV RBC AUTO: 84.7 FL (ref 80–100)
MICROSCOPIC EXAMINATION: YES
MONOCYTES ABSOLUTE: 0.6 K/UL (ref 0–1.3)
MONOCYTES RELATIVE PERCENT: 12 %
NEUTROPHILS ABSOLUTE: 2.9 K/UL (ref 1.7–7.7)
NEUTROPHILS RELATIVE PERCENT: 62.6 %
NITRITE, URINE: NEGATIVE
PDW BLD-RTO: 15.9 % (ref 12.4–15.4)
PH UA: 6 (ref 5–8)
PLATELET # BLD: 232 K/UL (ref 135–450)
PMV BLD AUTO: 7.6 FL (ref 5–10.5)
PROTEIN UA: NEGATIVE MG/DL
RBC # BLD: 4.58 M/UL (ref 4.2–5.9)
RBC UA: 3 /HPF (ref 0–4)
SPECIFIC GRAVITY UA: 1.02 (ref 1–1.03)
URINE TYPE: ABNORMAL
UROBILINOGEN, URINE: 0.2 E.U./DL
WBC # BLD: 4.6 K/UL (ref 4–11)
WBC UA: 4 /HPF (ref 0–5)

## 2022-04-16 LAB
A/G RATIO: 1.2 (ref 1.1–2.2)
ALBUMIN SERPL-MCNC: 3.9 G/DL (ref 3.4–5)
ALP BLD-CCNC: 62 U/L (ref 40–129)
ALT SERPL-CCNC: 30 U/L (ref 10–40)
ANION GAP SERPL CALCULATED.3IONS-SCNC: 16 MMOL/L (ref 3–16)
AST SERPL-CCNC: 26 U/L (ref 15–37)
BILIRUB SERPL-MCNC: 0.4 MG/DL (ref 0–1)
BUN BLDV-MCNC: 20 MG/DL (ref 7–20)
CALCIUM SERPL-MCNC: 9 MG/DL (ref 8.3–10.6)
CHLORIDE BLD-SCNC: 102 MMOL/L (ref 99–110)
CHOLESTEROL, TOTAL: 134 MG/DL (ref 0–199)
CO2: 26 MMOL/L (ref 21–32)
CREAT SERPL-MCNC: 1.3 MG/DL (ref 0.8–1.3)
GFR AFRICAN AMERICAN: >60
GFR NON-AFRICAN AMERICAN: 54
GLUCOSE BLD-MCNC: 121 MG/DL (ref 70–99)
HDLC SERPL-MCNC: 43 MG/DL (ref 40–60)
LDL CHOLESTEROL CALCULATED: 77 MG/DL
POTASSIUM SERPL-SCNC: 3.7 MMOL/L (ref 3.5–5.1)
SODIUM BLD-SCNC: 144 MMOL/L (ref 136–145)
TOTAL PROTEIN: 7.1 G/DL (ref 6.4–8.2)
TRIGL SERPL-MCNC: 69 MG/DL (ref 0–150)
TSH REFLEX: 2.05 UIU/ML (ref 0.27–4.2)
VLDLC SERPL CALC-MCNC: 14 MG/DL

## 2022-04-19 ENCOUNTER — HOSPITAL ENCOUNTER (OUTPATIENT)
Dept: ULTRASOUND IMAGING | Age: 72
Discharge: HOME | End: 2022-04-19
Payer: MEDICARE

## 2022-04-19 DIAGNOSIS — Z13.6 ENCOUNTER FOR ABDOMINAL AORTIC ANEURYSM (AAA) SCREENING: ICD-10-CM

## 2022-04-19 LAB — LIPOPROTEIN (A): 73 MG/DL

## 2022-04-19 PROCEDURE — 76706 US ABDL AORTA SCREEN AAA: CPT

## 2022-04-25 NOTE — PROGRESS NOTES
Subjective:      Patient ID: Willa Hardin is a 66 y.o. male. HPI He is here for a check up and f/u on hypertension, hyperlipidemia, elevated BMI, IGT. He is taking medication as prescribed and continues to work on a more balanced meal plan and more physical activity. He has some pain and stiffness from knee and back arthritis. Plans to get back in the gym and work on these areas specifically. Complains of right hand numbness after typing for an hour. Stops typing with resolution of symptoms only to return if restarts typing. Review of Systems   Constitutional:        Elevated BMI. 33.1. See HPI. HENT: Negative. Eyes: Negative. Respiratory: Negative. Cardiovascular:        HTN, on stable med regimen including Ca, and B blker, ARB, and diuretic. Gastrointestinal: Negative. Endocrine:        Hyperlipidemia, treated with statin. LDL 77. Lpa 73. IGT: controlled with diet. A1c 5.8. Genitourinary: Negative. Musculoskeletal: Negative. Skin: Negative. Allergic/Immunologic:   Neurological: Negative. Psychiatric/Behavioral: Negative. Objective:   Physical Exam   Constitutional: He is oriented to person, place, and time. He appears well-developed and well-nourished. No distress. HENT:   Head: Normocephalic and atraumatic. Right Ear: External ear normal.   Left Ear: External ear normal.   Nose: Nose normal.   Mouth/Throat: Oropharynx is clear and moist.   Eyes: Pupils are equal, round, and reactive to light. Conjunctivae and EOM are normal. No scleral icterus. Neck: Normal range of motion. Neck supple. No thyromegaly present. Cardiovascular: Normal rate, regular rhythm, normal heart sounds and intact distal pulses. Pulmonary/Chest: Effort normal and breath sounds normal.   Abdominal: Soft. Bowel sounds are normal. He exhibits no mass. Lymphadenopathy:     He has no cervical adenopathy. POPPY: left flexor surface radial aspect ganglion. Neurological: He is alert and oriented to person, place, and time. He has normal reflexes. Skin: Skin is warm and dry. Psychiatric: He has a normal mood and affect. His behavior is normal. Judgment and thought content normal.       Assessment:        Diagnosis Orders   1. Essential hypertension  Continue same med regimen. DASH diet discussed. 2. BMI 33.0-33.9,adult  Lower calories and regular physical activity discussed. 3. Mixed hyperlipidemia  Heart healthy diet and statin compliance discussed. 4.      Right hand numbness with use. Suspect CTS. Schedule EMG. Cock up splints. 5.      IGT: diet controlled. Discussed diet, physical activity and weight control. Plan:    See plans above.

## 2022-05-09 RX ORDER — HYDROCHLOROTHIAZIDE 25 MG/1
TABLET ORAL
Qty: 90 TABLET | Refills: 3 | Status: SHIPPED | OUTPATIENT
Start: 2022-05-09

## 2022-05-09 RX ORDER — METOPROLOL TARTRATE 50 MG/1
TABLET, FILM COATED ORAL
Qty: 180 TABLET | Refills: 3 | Status: SHIPPED | OUTPATIENT
Start: 2022-05-09

## 2022-05-17 ENCOUNTER — HOSPITAL ENCOUNTER (OUTPATIENT)
Dept: NEUROLOGY | Age: 72
Discharge: HOME | End: 2022-05-17
Payer: MEDICARE

## 2022-05-17 DIAGNOSIS — G56.01 CARPAL TUNNEL SYNDROME OF RIGHT WRIST: ICD-10-CM

## 2022-05-17 PROCEDURE — 95886 MUSC TEST DONE W/N TEST COMP: CPT

## 2022-05-17 PROCEDURE — 95908 NRV CNDJ TST 3-4 STUDIES: CPT

## 2022-05-17 NOTE — PROCEDURES
Test Date:  2022    Patient: Laly Castillo : 1950 Physician: Matias Elder DO   Sex: Male ID#:  Ref Phys: John Gregory MD     Patient Complaints:  Patient is a 66year-old male who presents with numbness tingling pain in the right upper extremity onset over one year ago  Now with numbness of left hand     Patient History / Exam:  PMH: + htn, no neck or arm surgery PE reflexes absent, + thumb opposition weakness     NCV & EMG Findings:  Evaluation of the left median (APB) motor and the right median (APB) motor nerves showed prolonged distal onset latency (L4.7, R6.3 ms) and reduced amplitude (L4.6, R2.5 mV). The left median sensory nerve showed prolonged distal peak latency (5.0 ms), reduced amplitude (7 µV), and decreased conduction velocity (28 m/s). The right median sensory nerve showed no response. The right ulnar sensory nerve showed reduced amplitude (11 µV) and decreased conduction velocity (38 m/s). All remaining nerves (as indicated in the following tables) were within normal limits. All examined muscles (as indicated in the following table) showed no evidence of electrical instability. Impression:  Study is consistent with bilateral carpal tunnel syndrome. moderate severity.  no evidence of an acute radiculopathy ot other entrapment neuropathy in the areas tested today        Matias Elder DO        Nerve Conduction Studies  Motor Nerve Results      Latency Amplitude F-Lat Segment Distance CV Comment   Site (ms) Norm (mV) Norm (ms)  (cm) (m/s) Norm    Left Median (APB) Motor   Wrist 4.7  < 4.2 4.6  > 5.0         Right Median (APB) Motor   Wrist 6.3  < 4.2 2.5  > 5.0         Elbow 11.3 - 2.5 -  Elbow-Wrist 25 50  > 50    Right Ulnar (ADM) Motor   Wrist 3.2  < 4.2 7.6  > 3.0         Bel Elbow 8.0 - 4.0 -  Bel Elbow-Wrist 25 52  > 50    Abv Elbow 8.6 - 5.4 -  Abv Elbow-Bel Elbow 4 67  > 48      Sensory Nerve Results      Latency (Peak) Amplitude (P-P) Segment Distance CV Comment   Site (ms) Norm (µV) Norm  (cm) (m/s) Norm    Left Median Sensory   Wrist-Dig II 5.0  < 3.6 7  > 10 Wrist-Dig II 14 28  > 39    Right Median Sensory   Wrist-Dig II NR  < 3.6 NR  > 10 Wrist-Dig II 14 NR  > 39    Right Ulnar Sensory   Wrist-Dig V 3.7  < 3.7 11  > 15 Wrist-Dig V 14 38  > 38        Electromyography     Side Muscle Nerve Root Ins Act Fibs Psw Amp Dur Poly Recrt Int Lacinda Rink Comment   Right Deltoid Axillary C5-C6 Nml Nml Nml Nml Nml 0 Nml Nml    Right Biceps Musculocut C5-C6 Nml Nml Nml Nml Nml 0 Nml Nml    Right Triceps Radial C6-C8 Nml Nml Nml Nml Nml 0 Nml Nml    Right Brachiorad Radial C5-C6 Nml Nml Nml Nml Nml 0 Nml Nml    Right Pronator Teres Median C6-C7 Nml Nml Nml Nml Nml 0 Nml Nml    Right EIP Post Interosseous,  R... C7-C8 Nml Nml Nml Nml Nml 0 Nml Nml    Right APB Median C8-T1 Nml Nml Nml Nml Nml 0 Nml Nml    Right FDI Ulnar C8-T1 Nml Nml Nml Nml Nml 0 Nml Nml    Right Cervical Paraspinal (Uppe. .. Rami C1-C3 Nml Nml Nml         Right Cervical Paraspinal (Mid) Rami C4-C6 Nml Nml Nml         Right Cervical Paraspinal (Rambo Stark. ..  Rami C7-C8 Nml Nml Nml           Electronically signed by Beryle Pollack, DO on 5/17/2022 at 1:05 PM

## 2022-07-27 SDOH — HEALTH STABILITY: PHYSICAL HEALTH: ON AVERAGE, HOW MANY DAYS PER WEEK DO YOU ENGAGE IN MODERATE TO STRENUOUS EXERCISE (LIKE A BRISK WALK)?: 3 DAYS

## 2022-07-27 SDOH — HEALTH STABILITY: PHYSICAL HEALTH: ON AVERAGE, HOW MANY MINUTES DO YOU ENGAGE IN EXERCISE AT THIS LEVEL?: 60 MIN

## 2022-07-27 ASSESSMENT — LIFESTYLE VARIABLES
HOW OFTEN DURING THE LAST YEAR HAVE YOU FOUND THAT YOU WERE NOT ABLE TO STOP DRINKING ONCE YOU HAD STARTED: NEVER
HAS A RELATIVE, FRIEND, DOCTOR, OR ANOTHER HEALTH PROFESSIONAL EXPRESSED CONCERN ABOUT YOUR DRINKING OR SUGGESTED YOU CUT DOWN: NO
HOW OFTEN DURING THE LAST YEAR HAVE YOU HAD A FEELING OF GUILT OR REMORSE AFTER DRINKING: NEVER
HOW OFTEN DURING THE LAST YEAR HAVE YOU BEEN UNABLE TO REMEMBER WHAT HAPPENED THE NIGHT BEFORE BECAUSE YOU HAD BEEN DRINKING: 0
HOW OFTEN DO YOU HAVE SIX OR MORE DRINKS ON ONE OCCASION: 1
HOW OFTEN DO YOU HAVE A DRINK CONTAINING ALCOHOL: 4 OR MORE TIMES A WEEK
HAVE YOU OR SOMEONE ELSE BEEN INJURED AS A RESULT OF YOUR DRINKING: NO
HOW OFTEN DURING THE LAST YEAR HAVE YOU BEEN UNABLE TO REMEMBER WHAT HAPPENED THE NIGHT BEFORE BECAUSE YOU HAD BEEN DRINKING: NEVER
HOW OFTEN DURING THE LAST YEAR HAVE YOU HAD A FEELING OF GUILT OR REMORSE AFTER DRINKING: 0
HAVE YOU OR SOMEONE ELSE BEEN INJURED AS A RESULT OF YOUR DRINKING: 0
HOW MANY STANDARD DRINKS CONTAINING ALCOHOL DO YOU HAVE ON A TYPICAL DAY: 1 OR 2
HOW MANY STANDARD DRINKS CONTAINING ALCOHOL DO YOU HAVE ON A TYPICAL DAY: 1
HOW OFTEN DURING THE LAST YEAR HAVE YOU NEEDED AN ALCOHOLIC DRINK FIRST THING IN THE MORNING TO GET YOURSELF GOING AFTER A NIGHT OF HEAVY DRINKING: 0
HOW OFTEN DO YOU HAVE A DRINK CONTAINING ALCOHOL: 5
HOW OFTEN DURING THE LAST YEAR HAVE YOU FAILED TO DO WHAT WAS NORMALLY EXPECTED FROM YOU BECAUSE OF DRINKING: 0
HAS A RELATIVE, FRIEND, DOCTOR, OR ANOTHER HEALTH PROFESSIONAL EXPRESSED CONCERN ABOUT YOUR DRINKING OR SUGGESTED YOU CUT DOWN: 0
HOW OFTEN DURING THE LAST YEAR HAVE YOU FOUND THAT YOU WERE NOT ABLE TO STOP DRINKING ONCE YOU HAD STARTED: 0
HOW OFTEN DURING THE LAST YEAR HAVE YOU FAILED TO DO WHAT WAS NORMALLY EXPECTED FROM YOU BECAUSE OF DRINKING: NEVER
HOW OFTEN DURING THE LAST YEAR HAVE YOU NEEDED AN ALCOHOLIC DRINK FIRST THING IN THE MORNING TO GET YOURSELF GOING AFTER A NIGHT OF HEAVY DRINKING: NEVER

## 2022-07-27 ASSESSMENT — PATIENT HEALTH QUESTIONNAIRE - PHQ9
SUM OF ALL RESPONSES TO PHQ9 QUESTIONS 1 & 2: 0
SUM OF ALL RESPONSES TO PHQ QUESTIONS 1-9: 0
2. FEELING DOWN, DEPRESSED OR HOPELESS: 0
SUM OF ALL RESPONSES TO PHQ QUESTIONS 1-9: 0
1. LITTLE INTEREST OR PLEASURE IN DOING THINGS: 0

## 2022-08-03 ENCOUNTER — OFFICE VISIT (OUTPATIENT)
Dept: INTERNAL MEDICINE CLINIC | Age: 72
End: 2022-08-03
Payer: MEDICARE

## 2022-08-03 VITALS
WEIGHT: 238.2 LBS | TEMPERATURE: 97.5 F | HEART RATE: 57 BPM | HEIGHT: 72 IN | BODY MASS INDEX: 32.26 KG/M2 | DIASTOLIC BLOOD PRESSURE: 82 MMHG | OXYGEN SATURATION: 98 % | SYSTOLIC BLOOD PRESSURE: 124 MMHG

## 2022-08-03 DIAGNOSIS — M19.90 ARTHRITIS: ICD-10-CM

## 2022-08-03 DIAGNOSIS — E78.2 MIXED HYPERLIPIDEMIA: ICD-10-CM

## 2022-08-03 DIAGNOSIS — R73.02 IGT (IMPAIRED GLUCOSE TOLERANCE): ICD-10-CM

## 2022-08-03 DIAGNOSIS — I10 PRIMARY HYPERTENSION: ICD-10-CM

## 2022-08-03 DIAGNOSIS — G56.03 BILATERAL CARPAL TUNNEL SYNDROME: Primary | ICD-10-CM

## 2022-08-03 PROBLEM — N18.30 CHRONIC RENAL DISEASE, STAGE III (HCC): Status: ACTIVE | Noted: 2022-08-03

## 2022-08-03 PROCEDURE — 1123F ACP DISCUSS/DSCN MKR DOCD: CPT | Performed by: INTERNAL MEDICINE

## 2022-08-03 PROCEDURE — 99214 OFFICE O/P EST MOD 30 MIN: CPT | Performed by: INTERNAL MEDICINE

## 2022-08-03 SDOH — ECONOMIC STABILITY: FOOD INSECURITY: WITHIN THE PAST 12 MONTHS, THE FOOD YOU BOUGHT JUST DIDN'T LAST AND YOU DIDN'T HAVE MONEY TO GET MORE.: NEVER TRUE

## 2022-08-03 SDOH — ECONOMIC STABILITY: FOOD INSECURITY: WITHIN THE PAST 12 MONTHS, YOU WORRIED THAT YOUR FOOD WOULD RUN OUT BEFORE YOU GOT MONEY TO BUY MORE.: NEVER TRUE

## 2022-08-03 ASSESSMENT — SOCIAL DETERMINANTS OF HEALTH (SDOH): HOW HARD IS IT FOR YOU TO PAY FOR THE VERY BASICS LIKE FOOD, HOUSING, MEDICAL CARE, AND HEATING?: NOT HARD AT ALL

## 2022-08-03 NOTE — PROGRESS NOTES
Patient: Lendon Cabot is a 66 y.o. male who presents today with the following Chief Complaint(s):    Chief Complaint   Patient presents with    Follow-up     Blood pressure         HPIright hip arthritis. Back pain. Arthritis. Does some exercises. EMG   Current Outpatient Medications   Medication Sig Dispense Refill    metoprolol tartrate (LOPRESSOR) 50 MG tablet TAKE 1 TABLET BY MOUTH TWICE DAILY 180 tablet 3    hydroCHLOROthiazide (HYDRODIURIL) 25 MG tablet TAKE 1 TABLET BY MOUTH DAILY FOR BLOOD PRESSURE 90 tablet 3    amLODIPine-atorvastatatin (CADUET) 10-10 MG per tablet TAKE 1 TABLET BY MOUTH EVERY DAY 90 tablet 3    candesartan (ATACAND) 32 MG tablet Take 1 tablet by mouth daily 30 tablet 5    tadalafil (CIALIS) 20 MG tablet Take 1 tablet by mouth daily as needed for Erectile Dysfunction 30 tablet 5    potassium chloride (KLOR-CON M) 20 MEQ TBCR extended release tablet Take 20 mEq by mouth 2 times daily       Handicap Placard MISC by Does not apply route Diagnosis: arthritis. Expires: 1/23/22. 1 each 0    aspirin 81 MG EC tablet Take 81 mg by mouth every 48 hours as needed. potassium chloride (KLOR-CON M) 20 MEQ extended release tablet Take 1 tablet by mouth 2 times daily 180 tablet 3     No current facility-administered medications for this visit. Patient's past medical history, surgical history, family history, medications,and allergies  were all reviewed and updated as appropriate today. Review of Systems      Physical Exam    Vitals:    08/03/22 1452   BP: 124/82   Pulse:    Temp:    SpO2:        Assessment:  No diagnosis found. Plan:  There are no diagnoses linked to this encounter.

## 2022-08-03 NOTE — PATIENT INSTRUCTIONS
[facility] Laboratory Locations - No appointment necessary. @ indicates the location is open Saturdays in addition to Monday through Friday. Call your preferred location for test preparation, business hours and other information you need. [employer] accepts [employer]. [facility]    @ [place] Lab Svcs. 3 [facility] Adeola . [place], [address]   Ph: [phone] [place] [facility] Svcs. [address]   Ph: [phone]  @ [facility] Sv[facility]. [facility]   Ph: [phone]    [facility] Lab Svcs. 409 [facility] [place], [address]   Ph: [phone] @ [place] Lab Svcs. [address]  Ph: [phone] @ [place] Lab Svcs. 8[address] [facility]. Re[address]   Ph: [phone]     [place] [address] cs. [place] [address]  Ph: [phone]    [place]   @ [place] Lab Svcs. [address] [place], [place] [address]   Ph: [phone] [place] [facility] Bldg. [address] [facility], [address] [place] [address]  Ph: [phone] [place] [address]   [facility]:  [address] Lab Svcs. 54 [facility]   Ph: [phone] [facility]. Lab Svcs.   [address] [facility], [address] [facility]   Ph: [phone]

## 2022-08-06 SDOH — HEALTH STABILITY: PHYSICAL HEALTH: ON AVERAGE, HOW MANY MINUTES DO YOU ENGAGE IN EXERCISE AT THIS LEVEL?: 60 MIN

## 2022-08-06 SDOH — HEALTH STABILITY: PHYSICAL HEALTH: ON AVERAGE, HOW MANY DAYS PER WEEK DO YOU ENGAGE IN MODERATE TO STRENUOUS EXERCISE (LIKE A BRISK WALK)?: 3 DAYS

## 2022-08-06 ASSESSMENT — SOCIAL DETERMINANTS OF HEALTH (SDOH)
WITHIN THE LAST YEAR, HAVE YOU BEEN HUMILIATED OR EMOTIONALLY ABUSED IN OTHER WAYS BY YOUR PARTNER OR EX-PARTNER?: NO
WITHIN THE LAST YEAR, HAVE YOU BEEN KICKED, HIT, SLAPPED, OR OTHERWISE PHYSICALLY HURT BY YOUR PARTNER OR EX-PARTNER?: NO
WITHIN THE LAST YEAR, HAVE YOU BEEN AFRAID OF YOUR PARTNER OR EX-PARTNER?: NO
WITHIN THE LAST YEAR, HAVE TO BEEN RAPED OR FORCED TO HAVE ANY KIND OF SEXUAL ACTIVITY BY YOUR PARTNER OR EX-PARTNER?: NO

## 2022-08-08 ENCOUNTER — OFFICE VISIT (OUTPATIENT)
Dept: ORTHOPEDIC SURGERY | Age: 72
End: 2022-08-08
Payer: MEDICARE

## 2022-08-08 VITALS — RESPIRATION RATE: 16 BRPM | BODY MASS INDEX: 32.23 KG/M2 | HEIGHT: 72 IN | WEIGHT: 238 LBS

## 2022-08-08 DIAGNOSIS — G56.00 CARPAL TUNNEL SYNDROME, UNSPECIFIED LATERALITY: Primary | ICD-10-CM

## 2022-08-08 PROCEDURE — 99214 OFFICE O/P EST MOD 30 MIN: CPT | Performed by: ORTHOPAEDIC SURGERY

## 2022-08-08 NOTE — LETTER
CONSENT TO OPERATION  AND/OR OTHER PROCEDURE(S)          PATIENT : Bi Owen   YOB: 1950      DATE : 8/8/22          1. I request and consent that Dr. Poonam Villalpando,  and/or his associates or assistants perform an operation and/or procedures on the above patient at  [place] [address], to treat the condition(s) which appear indicated by the diagnostic studies already performed. I have been told that in general terms the nature, purpose and reasonable expectations of the operation and/or procedure(s) are:      right Carpal Tunnel Release   followed 3 weeks later by   left Carpal Tunnel Release       2. It has been explained to me by the informing physician that during the course of the operation and/or procedure(s) unforeseen conditions may be revealed that necessitate an extension of the original operation and/or procedure(s) or different operation and/or procedures than those set forth in Paragraph 1. I therefore authorize and request that my physician and/or his associates or assistants perform such operations and/or procedures as are necessary and desirable in the exercise of professional judgment. The authority granted under this Paragraph 2 shall extend to all conditions that require treatment and are known to my physician at the time the operation is commenced. 3. I have been made aware by the informing physician of certain risks and consequences that are associated with the operation and/or procedure(s) described in Paragraph 1, the reasonable alternative methods or treatment, the possible consequences, the possibility that the operation and/or procedure(s) may be unsuccessful and the possibility of complications.   I understand the reasonably known risks to be:      - Bleeding  - Infection  - Poor Healing  - Possible Damage to Nerve, Vessel, Tendon/Muscle or Bone  - Need for further Treatment/Surgery  - Stiffness  - Pain  - Residual or Recurrent Symptoms  - Anesthetic and/or Medical Risks  - We have discussed the specific limitations and risks of hospital and/or office based treatment at this time due to the COVID-19 pandemic                I have been counseled about the risks of aristeo Covid-19 in the elliott-operative and post-operative periods related to this procedure. I have been made aware that aristeo Covid-19 around the time of a surgical procedure may worsen my prognosis for recovering from the virus and lend to a higher morbidity and or mortality risk. With this knowledge, I have requested to proceed with the procedure as scheduled. 4. I have also been informed by the informing physician that there are other risks from both known and unknown causes that are attendant to the performance of any surgical procedure. I am aware that the practice of medicine and surgery is not an exact science, and that no guarantees have been made to me concerning the results of the operation and/or procedure(s). 5. I   CONSENT / REFUSE CONSENT  (strike the phrase that does not apply) to the taking of photographs before, during and/or after the operation or procedure for scientific/educational purposes. 6. I consent to the administration of anesthesia and to the use of such anesthetics as may be deemed advisable by the anesthesiologist who has been engaged by me or my physician. 7. I certify that I have read and understand the above consent to operation and/or other procedure(s); that the explanations therein referred to were made to me by the informing physician in advance of my signing this consent; that all blanks or statements requiring insertion or completion were filled in and inapplicable paragraphs, if any, were stricken before I signed; and that all questions asked by me about the operation and/or procedure(s) which I have consented to have been fully answered in a satisfactory manner. _______________________           8/8/22                              Witness     Signature Of Patient         Date        Afia Omalley                                                 Informing Physician                                           Signature of Informing Physician                              If patient is unable to sign or is a minor, complete one of the following:    (A)  Patient is a minor   years of age. (B)  Patient is unable to sign because: The undersigned represents that he or she is duly authorized to execute this consent for and on behalf of the above named patient. Witness               o  Parent  o  Guardian   o  Spouse       o  Other (specify)                                           Patient Name: Ishmael Najjar  Patient YOB: 1950  Dr. Janeth Pgae' Return To Work Policy  Regarding your ability to return to work after surgery or injury, Dr. Janeth Page will not state that any patient is off of work or cannot work at all. He will place you on restrictions after your surgical procedure or injury. Depending on the details of your particular situation, Dr. Janeth Page may state that you will have either light use or no use of your hand for a specific number of weeks. It is your obligation to communicate with your employer regarding your restrictions. It is your employer's decision as to whether they will accommodate your restrictions (i.e. allow you to come to work in your restricted capacity) or to not allow you to return to work under your restrictions. Dr. Janeth Paeg does not participate in making this decision and cannot influence your employer regarding their decision. If you do not communicate your restrictions to your employer, or if you do not present to work as you are scheduled to, Dr. Janeth Page will not provide an 'excuse' to explain your absence.   A doctors note, or official forms (BWC, FMLA, etc.) will be filled out, upon request, to indicate your date of surgery and your restrictions as stated above. Dr. Minnie Almaguer' Narcotic Policy  Patients will only be prescribed narcotics after surgical procedures or significant injury. Not all procedures cause pain great enough to require Narcotics and thus, not all patients will receive prescriptions after surgical procedures or injuries. Narcotics are never prescribed for chronic conditions. Narcotics are never prescribed for use longer than one week at a time. Refills are only granted in unusual circumstances and only at Dr. Michell Jones discretion. Patients who are receiving narcotic medication from another physician or who are under pain management contracts will not be given a prescription for narcotics for any reason. Surgery Arrival Time:  You have been advised of the START TIME for your surgery as well as the ARRIVAL TIME at which you need to arrive at the surgery facility. Please understand that there is a certain amount of preparation which takes place at the surgery facility prior to the start of your surgery. If you arrive later than your scheduled ARRIVAL TIME, it may be necessary to cancel your surgery for that day and reschedule your procedure due to lack of adequate time for pre-surgery preparations. Thank you for being on time for your arrival.    I have read the above policies and understand that by agreeing to proceed with treatment by Dr. Claire Pa and his team, that I am agreeing to abide by these policies.   Patient Name:  Izabela Morrison    Patient Signature:  _____________________________    Yaima Hunting Date:   8/8/22

## 2022-08-08 NOTE — Clinical Note
Dear  Hayley Garcia MD,  Thank you very much for your referral or Mr. Mark Barreto to me for evaluation and treatment of his Hand & Wrist condition. I appreciate your confidence in me and thank you for allowing me the opportunity to care for your patients. If I can be of any further assistance to you on this or any other patient, please do not hesitate to contact me. Sincerely,  Yg Moses.  Estrella Mcgowan MD Yes

## 2022-08-08 NOTE — LETTER
[facility] SURGERY  Surgery Scheduling Form:      DEMOGRAPHICS:                                                                                                              .    Patient Name:  Nadja Bella  Patient :  1950   Patient SS#:  [id number]    Patient Phone:  [phone] (home)  Alt. Patient Phone:    Patient Address:   [place] [address]    PCP:  Kailee Wynn MD  Insurance:    Payer/Plan Subscr  Sex Relation Sub. Ins. ID Effective Group Num   1. [address] A 1950 Male Self [id number]                                    [address] [id number]     Insurance ID Number:    DIAGNOSIS & PROCEDURE:                                                                                            .    Diagnosis:   Right Carpal Tunnel Syndrome (354.0/G56.01)  Operation:  right Carpal Tunnel Release [CPT: 09870]  Location:  [facility]  Surgeon:  Kelley Pedraza    SCHEDULING INFORMATION:                                                                                         .    Surgeon's Scheduling Instruction:  elective    RN Post-op Appt:  [x] Yes   [] No  Preferred Thursday:   [] Yes   [] No    Requested Date:  10/11/22 OR Time:  7:30am Patient Arrival Time: 6;15am  OR Time Required:  15  Minutes  Anesthesia:  MAC/TIVA  Equipment:  None  Mini C-Arm:  No   Standard C-Arm:  No  Status:  outpatient  PAT Required:  Yes  Comments:                      Eliot Benson. Clifford Avila MD  22 2:25 PM    BILLING INFORMATION:                                                                                                    .    Procedure:       CPT Code Modifier  right Carpal Tunnel Release   .                                        Pre Operative Physician Prophylaxis Orders - SCIP Protocols      Pre-Operative Antibiotic Order:    No Known Allergies       [x]  ----  No Antibiotic Ordered       []  ----  Give the following Antibiotic within 1 hour prior to start time:         Ancef 1 gram IV if patient is less than 200 pounds    or       Ancef 2 grams IV if patient is greater than 200 pounds    or      Vancomycin 1 gram IV (over 1 hour) if patient is allergic to           PENICILLINS or CEFALOSPORINS            Procedure: right Carpal Tunnel Release  10/11/22    Patient: Shaheen Palmer  :    1950    Physician Signature:     Date: 22  Time: 2:25 PM

## 2022-08-09 NOTE — PROGRESS NOTES
Mr. Rodrigo Van is a 66 y.o. right handed man  who is seen today in Hand Surgical Consultation at the request of Carl Michael MD.    He presents today regarding Right greater than Left symptoms which have been present for approximately 2 years. A history of antecedent trauma or injury is Absent. He reports symptoms to include moderate numbness & tingling in the Whole Hand. Hand symptoms do Frequently awaken him from sleep. He reports mild pain located in the palmar both wrists and both forearms. Symptoms are worsening over time. Previous treatment has included conservative measures and activity modification. He does not claim relation of his symptoms to his required work activities. He has undergone electrodiagnostic testing. I have today reviewed with Rodrigo Van the clinically relevant, past medical history, medications, allergies,  family history, social history, and Review Of Systems & I have documented any details relevant to today's presenting complaints in my history above. Mr. Ayla Kate's self-reported past medical history, medications, allergies,  family history, social history, and Review Of Systems have been scanned into the chart under the \"Media\" tab. Physical Exam:  Mr. Lisa Hurd most recent vitals:  Vitals  Resp: 16  Height: 6' (182.9 cm)  Weight: 238 lb (108 kg)    He is well nourished, oriented to person, place & time. He demonstrates appropriate mood and affect as well as normal gait and station. Skin: Normal in appearance, Normal Color, and Free of Lesions Bilaterally   Digital range of motion is without significant limitation bilaterally  Wrist range of motion is equal bilaterally   There is no evidence of gross joint instability bilaterally. Sensation is subjectively tingling in the Whole Hand bilaterally and objectively present in the same distribution bilaterally.   Vascular examination reveals normal and good capillary refill bilaterally  Swelling is mild in the distal volar forearm on the Right, greater than Left  Muscular strength is clinically appropriate bilaterally. Examination for Carpal Tunnel Syndrome shows Carpal Tunnel Compression Test to be Mildly Positive on the right & Mildly Positive on the left. The patient displays moderate baseline symptoms to potentially confound the exam.  The thenar musculature is not atrophied & weakened. Review of Electrodiagnostic Testing:  Electrodiagnostic Studies performed by another Physician outside of my practice were Personally Reviewed & Interpreted by myself today. Test performed on: 5/17/22    NERVE CONDUCTION STUDY:  RIGHT   Median Nerve: Sensory Latency: NR  Motor Latency: 6.3  Ulnar Nerve:  Conduction Velocity:  67    LEFT  Median Nerve: Sensory Latency: 5.0  Motor Latency: 4.7  Ulnar Nerve:  Conduction Velocity:  --    EMG:  RIGHT  Normal    LEFT  Not Performed    My Interpretation: This study is consistent with: Severe RIGHT Median Nerve Entrapment at the Carpal Tunnel and Moderate LEFT Median Nerve Entrapment at the Carpal Tunnel      Impression:  Mr. Erica Crump has developed Carpal Tunnel Syndrome, which is currently exacerbated, and presents requesting further treatment. Plan:  I have had a thorough discussion with Mr. Erica Crump regarding the treatment options available for his initially presenting carpal tunnel syndrome, which is causing him significant  limitations. I have outlined for Mr. Erica Crump the benefits and consequences of the various treatment modalities, including the fact that surgical treatment is the only modality which is reasonably expected to provide long lasting or permanent resolution of his symptoms. Based upon our current discussion and a reasonable understating of the options available to him, Mr. Erica Crump has selected to proceed with surgical Carpal Tunnel Release.   I have discussed the details of the surgical procedure, the pre, elliott and postoperative concerns and the appropriate expectations after surgery with Mr. Sylvester York today. He was given the opportunity to ask questions, voiced an understanding of the procedure, and he did wish to proceed with Staged Bilateral Carpal Tunnel Release. I had an extensive discussion with Mr. Sylvester York (and any family members present with him today) regarding the natural history, etiology, and long term consequences of this problem. We have mutually selected a surgical treatment plan  and, in my opinion, surgical intervention is indicated at this time. I have discussed with him the potential complications, limitations, expectations, alternatives, and risks of Carpal Tunnel Release. He has had full opportunity to ask his questions. I have answered them all to his satisfaction. I feel that Mr. Sylvester York (and any family members present with him today) do understand our discussion today and he has provided informed consent for Staged Bilateral Carpal Tunnel Release. I have also discussed with Mr. Sylvester York the other treatment options available to him for this condition. We have today selected to proceed with Surgical treatment. He has voiced and  understanding that there are other less aggressive treatment options which are available in this situation, albeit possibly less efficacious or durable, and he is comfortable with the plan that he has chosen. I have explained to Mr. Sylvester York that despite successful treatment (surgical decompression or otherwise) of his nerve entrapment, that due to his severe nerve damage, that he is likely to have some permanent residual symptoms that do not improve long term. I have also explained that maximal recovery of nerve function may take a full year or longer to realize. He voiced a clear understanding of this.     Mr. Sylvester York has been given a full verbal list of instructions and precautions related to his present condition. I have asked him to followup with me in the office at the prescribed time. He is also specifically requested to call or return to the office sooner if his symptoms change or worsen prior to the next scheduled appointment.

## 2022-08-12 RX ORDER — FOSINOPRIL SODIUM 40 MG/1
TABLET ORAL
Qty: 90 TABLET | Refills: 3 | OUTPATIENT
Start: 2022-08-12

## 2022-08-15 NOTE — PROGRESS NOTES
Subjective:      Patient ID: Raj Burroughs is a 66 y.o. male. HPI He is here for a check up and f/u on hypertension, hyperlipidemia, elevated BMI, IGT. He is taking medication as prescribed and continues to work on a more balanced meal plan and more physical activity. He has some pain and stiffness from knee, back and right hip arthritis. Plans to get back in the gym and work on these areas specifically. Complains of right hand numbness after typing for an hour. Stops typing with resolution of symptoms only to return if restarts typing. EMG demonstrates bilateral CTS of moderate severity. Review of Systems   Constitutional:        Elevated BMI. 32.3. See HPI. HENT: Negative. Eyes: Negative. Respiratory: Negative. Cardiovascular:        HTN, on stable med regimen including Ca, and B blker, ARB, and diuretic. Gastrointestinal: Negative. Endocrine:        Hyperlipidemia, treated with statin. LDL 77. Lpa 73. IGT: controlled with diet. Prior A1c 5.8. Genitourinary: Negative. Musculoskeletal: Negative. Skin: Negative. Allergic/Immunologic:   Neurological: Negative. Psychiatric/Behavioral: Negative. Objective:   Physical Exam   Constitutional: He is oriented to person, place, and time. He appears well-developed and well-nourished. No distress. HENT:   Head: Normocephalic and atraumatic. Right Ear: External ear normal.   Left Ear: External ear normal.   Nose: Nose normal.   Mouth/Throat: Oropharynx is clear and moist.   Eyes: Pupils are equal, round, and reactive to light. Conjunctivae and EOM are normal. No scleral icterus. Neck: Normal range of motion. Neck supple. No thyromegaly present. Cardiovascular: Normal rate, regular rhythm, normal heart sounds and intact distal pulses. Pulmonary/Chest: Effort normal and breath sounds normal.   Abdominal: Soft. Bowel sounds are normal. He exhibits no mass.    Lymphadenopathy:     He has no cervical adenopathy. POPPY: left flexor surface radial aspect ganglion. Neurological: He is alert and oriented to person, place, and time. He has normal reflexes. Skin: Skin is warm and dry. Psychiatric: He has a normal mood and affect. His behavior is normal. Judgment and thought content normal.       Assessment:        Diagnosis Orders   1. Essential hypertension  Continue same med regimen. DASH diet discussed. 2. BMI 33.0-33.9,adult  Lower calories and regular physical activity discussed. 3. Mixed hyperlipidemia  Heart healthy diet and statin compliance discussed. 4.  Bilateral CTS. Moderate severity. Discussed diagnosis. Hand consult. 5.  IGT: diet controlled. Discussed diet, physical activity and weight control. 6.  Osteoarthritis: diet, exercises, weight reduction discussed. Plan:    See plans above.

## 2022-09-06 RX ORDER — CANDESARTAN 32 MG/1
32 TABLET ORAL DAILY
Qty: 30 TABLET | Refills: 5 | OUTPATIENT
Start: 2022-09-06

## 2022-09-07 RX ORDER — CANDESARTAN 32 MG/1
32 TABLET ORAL DAILY
Qty: 30 TABLET | Refills: 5 | OUTPATIENT
Start: 2022-09-07

## 2022-09-08 ENCOUNTER — TELEPHONE (OUTPATIENT)
Dept: INTERNAL MEDICINE CLINIC | Age: 72
End: 2022-09-08

## 2022-09-08 RX ORDER — CANDESARTAN 32 MG/1
32 TABLET ORAL DAILY
Qty: 30 TABLET | Refills: 5 | Status: SHIPPED | OUTPATIENT
Start: 2022-09-08

## 2022-09-08 NOTE — TELEPHONE ENCOUNTER
[facility]  Progress Note    Jeremy Baires Patient Status:  No patient class for patient encounter    1966 MRN [id number]   Location [address] [place] [address] Attending No att. providers found   Hosp Day # 0 PCP Rosana Sifuentes Medication:   Requested Prescriptions     Pending Prescriptions Disp Refills    candesartan (ATACAND) 32 MG tablet 30 tablet 5     Sig: Take 1 tablet by mouth daily        Last Filled:      Patient Phone Number: [phone] (home)     Last appt: 8/3/2022   Next appt: 11/3/2022    Last OARRS: No flowsheet data found. Surgery  8/18/2017  4:46 PM

## 2022-09-20 ENCOUNTER — TELEMEDICINE (OUTPATIENT)
Dept: INTERNAL MEDICINE CLINIC | Age: 72
End: 2022-09-20
Payer: MEDICARE

## 2022-09-20 DIAGNOSIS — Z00.00 MEDICARE ANNUAL WELLNESS VISIT, SUBSEQUENT: Primary | ICD-10-CM

## 2022-09-20 PROCEDURE — 1123F ACP DISCUSS/DSCN MKR DOCD: CPT | Performed by: NURSE PRACTITIONER

## 2022-09-20 PROCEDURE — G0439 PPPS, SUBSEQ VISIT: HCPCS | Performed by: NURSE PRACTITIONER

## 2022-09-20 SDOH — HEALTH STABILITY: PHYSICAL HEALTH: ON AVERAGE, HOW MANY MINUTES DO YOU ENGAGE IN EXERCISE AT THIS LEVEL?: 60 MIN

## 2022-09-20 SDOH — HEALTH STABILITY: PHYSICAL HEALTH: ON AVERAGE, HOW MANY DAYS PER WEEK DO YOU ENGAGE IN MODERATE TO STRENUOUS EXERCISE (LIKE A BRISK WALK)?: 4 DAYS

## 2022-09-20 ASSESSMENT — LIFESTYLE VARIABLES
HOW OFTEN DURING THE LAST YEAR HAVE YOU HAD A FEELING OF GUILT OR REMORSE AFTER DRINKING: 0
HOW OFTEN DURING THE LAST YEAR HAVE YOU BEEN UNABLE TO REMEMBER WHAT HAPPENED THE NIGHT BEFORE BECAUSE YOU HAD BEEN DRINKING: 0
HOW OFTEN DO YOU HAVE SIX OR MORE DRINKS ON ONE OCCASION: 1
HAS A RELATIVE, FRIEND, DOCTOR, OR ANOTHER HEALTH PROFESSIONAL EXPRESSED CONCERN ABOUT YOUR DRINKING OR SUGGESTED YOU CUT DOWN: 0
HOW OFTEN DURING THE LAST YEAR HAVE YOU FAILED TO DO WHAT WAS NORMALLY EXPECTED FROM YOU BECAUSE OF DRINKING: 0
HAVE YOU OR SOMEONE ELSE BEEN INJURED AS A RESULT OF YOUR DRINKING: 0
HOW OFTEN DURING THE LAST YEAR HAVE YOU NEEDED AN ALCOHOLIC DRINK FIRST THING IN THE MORNING TO GET YOURSELF GOING AFTER A NIGHT OF HEAVY DRINKING: NEVER
HOW OFTEN DURING THE LAST YEAR HAVE YOU FOUND THAT YOU WERE NOT ABLE TO STOP DRINKING ONCE YOU HAD STARTED: NEVER
HAS A RELATIVE, FRIEND, DOCTOR, OR ANOTHER HEALTH PROFESSIONAL EXPRESSED CONCERN ABOUT YOUR DRINKING OR SUGGESTED YOU CUT DOWN: NO
HOW MANY STANDARD DRINKS CONTAINING ALCOHOL DO YOU HAVE ON A TYPICAL DAY: 1
HOW OFTEN DO YOU HAVE A DRINK CONTAINING ALCOHOL: 5
HOW OFTEN DURING THE LAST YEAR HAVE YOU FAILED TO DO WHAT WAS NORMALLY EXPECTED FROM YOU BECAUSE OF DRINKING: NEVER
HOW OFTEN DURING THE LAST YEAR HAVE YOU NEEDED AN ALCOHOLIC DRINK FIRST THING IN THE MORNING TO GET YOURSELF GOING AFTER A NIGHT OF HEAVY DRINKING: 0
HAVE YOU OR SOMEONE ELSE BEEN INJURED AS A RESULT OF YOUR DRINKING: NO
HOW OFTEN DURING THE LAST YEAR HAVE YOU BEEN UNABLE TO REMEMBER WHAT HAPPENED THE NIGHT BEFORE BECAUSE YOU HAD BEEN DRINKING: NEVER
HOW MANY STANDARD DRINKS CONTAINING ALCOHOL DO YOU HAVE ON A TYPICAL DAY: 1 OR 2
HOW OFTEN DURING THE LAST YEAR HAVE YOU FOUND THAT YOU WERE NOT ABLE TO STOP DRINKING ONCE YOU HAD STARTED: 0
HOW OFTEN DO YOU HAVE A DRINK CONTAINING ALCOHOL: 4 OR MORE TIMES A WEEK
HOW OFTEN DURING THE LAST YEAR HAVE YOU HAD A FEELING OF GUILT OR REMORSE AFTER DRINKING: NEVER

## 2022-09-20 ASSESSMENT — PATIENT HEALTH QUESTIONNAIRE - PHQ9
SUM OF ALL RESPONSES TO PHQ QUESTIONS 1-9: 0
2. FEELING DOWN, DEPRESSED OR HOPELESS: 0
1. LITTLE INTEREST OR PLEASURE IN DOING THINGS: 0
SUM OF ALL RESPONSES TO PHQ QUESTIONS 1-9: 0
SUM OF ALL RESPONSES TO PHQ QUESTIONS 1-9: 0
SUM OF ALL RESPONSES TO PHQ9 QUESTIONS 1 & 2: 0
SUM OF ALL RESPONSES TO PHQ QUESTIONS 1-9: 0

## 2022-09-20 NOTE — PATIENT INSTRUCTIONS
[facility] Laboratory Locations - No appointment necessary. @ indicates the location is open Saturdays in addition to Monday through Friday. Call your preferred location for test preparation, business hours and other information you need. [employer] accepts [employer]. [facility]    @ [place] [facility] [address]   Ph: [phone] [facility] S[facility]. [address]   Ph: [phone]  @ [facility] Lab Svcs. [address] [facility]   Ph: [phone]    [facility] Svcs. [address]   Ph: [phone] @ [place] [facility] [address]  Ph: [phone] @ [facility] [place] [facility] Svcs. [address] [place] [address]. [address]   Ph: [phone]     [facility]. [place] [address]  Ph: [phone]    [place]   @ [place] [facility] [address] [facility] [place] [address]   Ph: [phone] [place] [facility] [address] [facility] [address]  Ph: [address] Lab Svcs[address] [facility]   Ph: [phone] [facility]. Lab Svcs. [address] [facility]   [address] [facility] [facility]   Ph: [phone]        Personalized Preventive Plan for Matt Linares - 9/20/2022  Medicare offers a range of preventive health benefits. Some of the tests and screenings are paid in full while other may be subject to a deductible, co-insurance, and/or copay. Some of these benefits include a comprehensive review of your medical history including lifestyle, illnesses that may run in your family, and various assessments and screenings as appropriate.     After reviewing your medical record and screening and assessments performed today your provider may have ordered immunizations, labs, imaging, and/or referrals for you. A list of these orders (if applicable) as well as your Preventive Care list are included within your After Visit Summary for your review. Other Preventive Recommendations:    A preventive eye exam performed by an eye specialist is recommended every 1-2 years to screen for glaucoma; cataracts, macular degeneration, and other eye disorders. A preventive dental visit is recommended every 6 months. Try to get at least 150 minutes of exercise per week or 10,000 steps per day on a pedometer . Order or download the FREE \"Exercise & Physical Activity: Your Everyday Guide\" from [employer]. Call 7-450.263.9944 or search [employer] online. You need 3826-4573 mg of calcium and 4497-0377 IU of vitamin D per day. It is possible to meet your calcium requirement with diet alone, but a vitamin D supplement is usually necessary to meet this goal.  When exposed to the sun, use a sunscreen that protects against both UVA and UVB radiation with an SPF of 30 or greater. Reapply every 2 to 3 hours or after sweating, drying off with a towel, or swimming. Always wear a seat belt when traveling in a car. Always wear a helmet when riding a bicycle or motorcycle.

## 2022-09-20 NOTE — PROGRESS NOTES
Medicare Annual Wellness Visit    Vivian Lewis is here for Medicare AWV    Assessment & Plan   Medicare annual wellness visit, subsequent    Recommendations for Preventive Services Due: see orders and patient instructions/AVS.  Recommended screening schedule for the next 5-10 years is provided to the patient in written form: see Patient Instructions/AVS.     Return for Medicare Annual Wellness Visit in 1 year. Subjective       Patient's complete Health Risk Assessment and screening values have been reviewed and are found in Flowsheets. The following problems were reviewed today and where indicated follow up appointments were made and/or referrals ordered. Positive Risk Factor Screenings with Interventions:       Tobacco Use:  Tobacco Use: High Risk    Smoking Tobacco Use: Heavy Smoker    Smokeless Tobacco Use: Never     E-cigarette/Vaping       Questions Responses    E-cigarette/Vaping Use Never User    Start Date     Passive Exposure     Quit Date     Counseling Given     Comments           Substance Use - Tobacco Interventions:   Former         [address] and ACP:  General  In general, how would you say your health is?: Good  In the past 7 days, have you experienced any of the following: New or Increased Pain, New or Increased Fatigue, Loneliness, Social Isolation, Stress or Anger?: No  Do you get the social and emotional support that you need?: Yes  Do you have a Living Will?: (!) No    Advance Directives       Power of [address] [place] [address] Will ACP-Advance Directive ACP-Power of     Not on File Filed on 12/23/15 Filed Not on File          General Health Risk Interventions:  No Living Will: Patient declines ACP discussion/assistance    Health Habits/Nutrition:  Physical Activity: Sufficiently Active    Days of Exercise per Week: 4 days    Minutes of Exercise per Session: 60 min     Have you lost any weight without trying in the past 3 months?: No     Have you seen the dentist within the past year?: Appointment is scheduled  Health Habits/Nutrition Interventions:  Dental exam overdue:  Has appt scheduled             Objective      Patient-Reported Vitals  No data recorded          No Known Allergies  Prior to Visit Medications    Medication Sig Taking? Authorizing Provider   candesartan (ATACAND) 32 MG tablet Take 1 tablet by mouth daily  MD June Vargas by Does not apply route Diagnosis: arthritis. Expires: 8/3/25. Manny Robbins MD   metoprolol tartrate (LOPRESSOR) 50 MG tablet TAKE 1 TABLET BY MOUTH TWICE DAILY  Manny Robbins MD   hydroCHLOROthiazide (HYDRODIURIL) 25 MG tablet TAKE 1 TABLET BY MOUTH DAILY FOR BLOOD PRESSURE  Manny Robbins MD   amLODIPine-atorvastatatin (CADUET) 10-10 MG per tablet TAKE 1 TABLET BY MOUTH EVERY DAY  Manny Robbins MD   tadalafil (CIALIS) 20 MG tablet Take 1 tablet by mouth daily as needed for Erectile Dysfunction  Manny Robbins MD   potassium chloride (KLOR-CON M) 20 MEQ extended release tablet Take 1 tablet by mouth 2 times daily  Manny Robbins MD   potassium chloride (KLOR-CON M) 20 MEQ TBCR extended release tablet Take 20 mEq by mouth 2 times daily   Historical Provider, MD   Handicap Placard MISC by Does not apply route Diagnosis: arthritis. Expires: 1/23/22. Manny Robbins MD   aspirin 81 MG EC tablet Take 81 mg by mouth every 48 hours as needed. Historical Provider, MD Dean (Including outside providers/suppliers regularly involved in providing care):   Patient Care Team:  Manny Robbins MD as PCP - General (Internal Medicine)  Manny Robbins MD as PCP - [facility] Provider  Jessica Carbone MD as Surgeon (General Surgery)     Reviewed and updated this visit:  Allergies  Meds  Problems           Huang Arora, was evaluated through a synchronous (real-time) audio-video encounter. The patient (or guardian if applicable) is aware that this is a billable service, which includes applicable co-pays.  This Virtual

## 2022-10-03 NOTE — PROGRESS NOTES
Sitka Franco    Age 74 y.o.    male    1950    BRISEIDA [id number]    10/11/2022  Arrival Time_____________  OR Time____________25 48 Leonie Wilkins     Procedure(s):  RIGHT CARPAL TUNNEL RELEASE                      Monitor Anesthesia Care    Surgeon(s):  Zen Degroot, MD       Phone [phone] ([place])     [address]  Cell         Work  _____________________________________________________________________  _____________________________________________________________________  _____________________________________________________________________  _____________________________________________________________________  _____________________________________________________________________    PCP _____________________________ Phone_________________     H&P__________________Bringing      Chart            Epic   DOS      Called________  EKG__________________Bringing      Chart            Epic   DOS      Called________  LAB__________________ Bringing      Chart            Epic   DOS      Called________  Cardiac Clearance_______Bringing      Chart            Epic      DOS      Called________    Cardiologist________________________ Phone___________________________    ? Cheondoism concerns / Waiver on Chart            PAT Communications________________  ? Pre-op Instructions Given [place]          _________________________________  ? Directions to Surgery Center                          _________________________________  ? Transportation Home_______________      __________________________________  ?  Crutches/Walker__________________        __________________________________    ________Pre-op Orders   _______Transcribed    _______Wt.  ________Pharmacy          _______SCD  ______VTE     ______TED [place]  _______  Surgery Consent    _______  Anesthesia Consent         COVID DATE______________LOCATION________________ RESULT__________

## 2022-10-04 ENCOUNTER — TELEPHONE (OUTPATIENT)
Dept: ORTHOPEDIC SURGERY | Age: 72
End: 2022-10-04

## 2022-10-04 NOTE — TELEPHONE ENCOUNTER
CPT: [id number]  BODY PART: right wrist  STATUS: outpatient  LOCATION: [place] Doc: approved    Submitted online with [facility], approved # [id number]  10/11/22-12/31/22

## 2022-10-05 ENCOUNTER — OFFICE VISIT (OUTPATIENT)
Dept: INTERNAL MEDICINE CLINIC | Age: 72
End: 2022-10-05
Payer: MEDICARE

## 2022-10-05 VITALS
OXYGEN SATURATION: 96 % | DIASTOLIC BLOOD PRESSURE: 88 MMHG | WEIGHT: 232.4 LBS | BODY MASS INDEX: 31.48 KG/M2 | SYSTOLIC BLOOD PRESSURE: 124 MMHG | HEART RATE: 60 BPM | HEIGHT: 72 IN

## 2022-10-05 DIAGNOSIS — Z01.818 PRE-OP EXAM: ICD-10-CM

## 2022-10-05 DIAGNOSIS — I10 PRIMARY HYPERTENSION: ICD-10-CM

## 2022-10-05 DIAGNOSIS — Z01.818 PRE-OP EXAM: Primary | ICD-10-CM

## 2022-10-05 DIAGNOSIS — Z23 FLU VACCINE NEED: ICD-10-CM

## 2022-10-05 DIAGNOSIS — E78.2 MIXED HYPERLIPIDEMIA: ICD-10-CM

## 2022-10-05 DIAGNOSIS — G56.03 BILATERAL CARPAL TUNNEL SYNDROME: ICD-10-CM

## 2022-10-05 DIAGNOSIS — R73.02 IGT (IMPAIRED GLUCOSE TOLERANCE): ICD-10-CM

## 2022-10-05 PROCEDURE — 90694 VACC AIIV4 NO PRSRV 0.5ML IM: CPT | Performed by: INTERNAL MEDICINE

## 2022-10-05 PROCEDURE — 99214 OFFICE O/P EST MOD 30 MIN: CPT | Performed by: INTERNAL MEDICINE

## 2022-10-05 PROCEDURE — 1123F ACP DISCUSS/DSCN MKR DOCD: CPT | Performed by: INTERNAL MEDICINE

## 2022-10-05 PROCEDURE — G0008 ADMIN INFLUENZA VIRUS VAC: HCPCS | Performed by: INTERNAL MEDICINE

## 2022-10-05 PROCEDURE — 93000 ELECTROCARDIOGRAM COMPLETE: CPT | Performed by: INTERNAL MEDICINE

## 2022-10-05 RX ORDER — FOSINOPRIL SODIUM 40 MG/1
TABLET ORAL
COMMUNITY
End: 2022-10-05

## 2022-10-05 RX ORDER — PREDNISOLONE ACETATE 10 MG/ML
SUSPENSION/ DROPS OPHTHALMIC
COMMUNITY
Start: 2021-05-25 | End: 2022-10-05

## 2022-10-05 RX ORDER — MOXIFLOXACIN 5 MG/ML
SOLUTION/ DROPS OPHTHALMIC
COMMUNITY
Start: 2021-05-25 | End: 2022-10-05

## 2022-10-05 RX ORDER — ROSUVASTATIN CALCIUM 10 MG/1
TABLET, COATED ORAL
COMMUNITY
End: 2022-10-05

## 2022-10-05 RX ORDER — BROMFENAC SODIUM 0.7 MG/ML
SOLUTION/ DROPS OPHTHALMIC
COMMUNITY
Start: 2021-05-25 | End: 2022-10-05

## 2022-10-05 NOTE — PROGRESS NOTES

## 2022-10-05 NOTE — PROGRESS NOTES
Diann See    Age 74 y.o.    male    1950    MRN [id number]    10/11/2022  Arrival Time_____________  OR Time____________25 48 Leonie Rivera [address] [place]     Procedure(s):  RIGHT CARPAL TUNNEL RELEASE                      Monitor Anesthesia Care    Surgeon(s):  Rigo Livingston, MD       Phone [phone] ([place])     [address]  Cell         Work  _____________________________________________________________________  _____________________________________________________________________  _____________________________________________________________________  _____________________________________________________________________  _____________________________________________________________________    PCP _____________________________ Phone_________________     H&P__________________Bringing      Chart            Epic   DOS      Called________  EKG__________________Bringing      Chart            Epic   DOS      Called________  LAB__________________ Bringing      Chart            Epic   DOS      Called________  Cardiac Clearance_______Bringing      Chart            Epic      DOS      Called________    Cardiologist________________________ Phone___________________________    ? Rastafari concerns / Waiver on Chart            PAT Communications________________  ? Pre-op Instructions Given [place]          _________________________________  ? Directions to Surgery Center                          _________________________________  ? Transportation Home_______________      __________________________________  ?  Crutches/Walker__________________        __________________________________    ________Pre-op Orders   _______Transcribed    _______Wt.  ________Pharmacy          _______SCD  ______VTE     ______TED [place]  _______  Surgery Consent    _______  Anesthesia Consent         COVID DATE______________LOCATION________________ RESULT__________

## 2022-10-05 NOTE — PATIENT INSTRUCTIONS
[facility] Locations - No appointment necessary. @ indicates the location is open Saturdays in addition to Monday through Friday. Call your preferred location for test preparation, business hours and other information you need. [employer] accepts [employer]. [facility]    @ [place] [facility] Svcs. [address] [facility] [address] [place] [address]. [place], [address]   Ph: [phone] [facility] Lab Svcs. [address]   Ph: [phone]  @ [place] Lab Svcs. [address] [facility]   Ph: [phone]    [facility] Lab Svcs. [address]   Ph: [phone] @ [place] [facility] Svcs. [address]  Ph: [phone] @ [facility] Svcs. [address] [facility]. [address]   Ph: [phone]     [facility] Svcs. [place] [address]  Ph: [phone]    [place]   @ [facility] Lab Svcs. [address] [facility] [address]ina [address]., [address] [facility] [address]   Ph: [phone] [facility] Bldg. [address], [address] [facility]  Ph: [phone] [facility] [address], [address]   [facility] [address] Lab Svcs. [address] [facility]   Ph: [phone] [facility]. Lab Svcs.   [address] [facility], [address] [facility]   Ph: [phone]

## 2022-10-05 NOTE — PROGRESS NOTES
Obstructive Sleep Apnea (JOHN) Screening     Patient:  Kyle Varghese    YOB: 1950      Medical Record #:  [id number]                     Date:  10/5/2022     1. Are you a loud and/or regular snorer? []  Yes       [x] No    2. Have you been observed to gasp or stop breathing during sleep? []  Yes       [x] No    3. Do you feel tired or groggy upon awakening or do you awaken with a headache?           []  Yes       [x] No    4. Are you often tired or fatigued during the wake time hours? []  Yes       [x] No    5. Do you fall asleep sitting, reading, watching TV or driving? []  Yes       [] No    6. Do you often have problems with memory or concentration? []  Yes       [] No    **If patient's score is ? 3 they are considered high risk for JOHN. An Anesthesia provider will evaluate the patient and develop a plan of care the day of surgery. Note:  If the patient's BMI is more than 35 kg m¯² , has neck circumference > 40 cm, and/or high blood pressure the risk is greater (© American Sleep Apnea Association, 2006).

## 2022-10-06 LAB
ANION GAP SERPL CALCULATED.3IONS-SCNC: 13 MMOL/L (ref 3–16)
BUN BLDV-MCNC: 19 MG/DL (ref 7–20)
CALCIUM SERPL-MCNC: 9.4 MG/DL (ref 8.3–10.6)
CHLORIDE BLD-SCNC: 99 MMOL/L (ref 99–110)
CO2: 28 MMOL/L (ref 21–32)
CREAT SERPL-MCNC: 1.3 MG/DL (ref 0.8–1.3)
GFR AFRICAN AMERICAN: >60
GFR NON-AFRICAN AMERICAN: 54
GLUCOSE BLD-MCNC: 118 MG/DL (ref 70–99)
POTASSIUM SERPL-SCNC: 3.9 MMOL/L (ref 3.5–5.1)
SODIUM BLD-SCNC: 140 MMOL/L (ref 136–145)

## 2022-10-06 NOTE — PROGRESS NOTES
Patient: Karlene Zhao is a 74 y.o. male who presents today with the following Chief Complaint(s):    Chief Complaint   Patient presents with    Pre-op Exam         HPIHe is here for a pre op physical exam and is scheduled for CTR surgery, right and then left. He otherwise feels well denying exertional CP or SOB. No recent infections. Current Outpatient Medications   Medication Sig Dispense Refill    candesartan (ATACAND) 32 MG tablet Take 1 tablet by mouth daily 30 tablet 5    Handicap Placard MISC by Does not apply route Diagnosis: arthritis. Expires: 8/3/25. 1 each 0    metoprolol tartrate (LOPRESSOR) 50 MG tablet TAKE 1 TABLET BY MOUTH TWICE DAILY 180 tablet 3    hydroCHLOROthiazide (HYDRODIURIL) 25 MG tablet TAKE 1 TABLET BY MOUTH DAILY FOR BLOOD PRESSURE 90 tablet 3    amLODIPine-atorvastatatin (CADUET) 10-10 MG per tablet TAKE 1 TABLET BY MOUTH EVERY DAY 90 tablet 3    tadalafil (CIALIS) 20 MG tablet Take 1 tablet by mouth daily as needed for Erectile Dysfunction 30 tablet 5    potassium chloride (KLOR-CON M) 20 MEQ TBCR extended release tablet Take 20 mEq by mouth 2 times daily       Handicap Placard MISC by Does not apply route Diagnosis: arthritis. Expires: 1/23/22. 1 each 0    aspirin 81 MG EC tablet Take 81 mg by mouth every 48 hours as needed. potassium chloride (KLOR-CON M) 20 MEQ extended release tablet Take 1 tablet by mouth 2 times daily 180 tablet 3     No current facility-administered medications for this visit. Patient's past medical history, surgical history, family history, medications,and allergies  were all reviewed and updated as appropriate today. Review of Systems see H and P in media. Physical Exam see H and P in media. Vitals:    10/05/22 1247   BP: 124/88   Pulse: 60   SpO2: 96%       Assessment:   Diagnosis Orders   1. Pre-op exam  EKG 12 Lead - Clinic Performed    Basic Metabolic Panel      2.  Bilateral carpal tunnel syndrome  CTR scheduled. 3. Primary hypertension  Stable. 4. Mixed hyperlipidemia  Stable. 5. IGT (impaired glucose tolerance)  Stable. 6. Flu vaccine need  Influenza, FLUAD, (age 72 y+), IM, Preservative Free, 0.5 mL   7. Cardiopulmonary exam is stable. Cardiac risk is low. 8.    May proceed with planned CTR surgeries. Plan:  See plans above.

## 2022-10-11 ENCOUNTER — HOSPITAL ENCOUNTER (OUTPATIENT)
Age: 72
Setting detail: OUTPATIENT SURGERY
Discharge: HOME | End: 2022-10-11
Attending: ORTHOPAEDIC SURGERY | Admitting: ORTHOPAEDIC SURGERY
Payer: MEDICARE

## 2022-10-11 ENCOUNTER — ANESTHESIA (OUTPATIENT)
Dept: OPERATING ROOM | Age: 72
End: 2022-10-11
Payer: MEDICARE

## 2022-10-11 ENCOUNTER — ANESTHESIA EVENT (OUTPATIENT)
Dept: OPERATING ROOM | Age: 72
End: 2022-10-11
Payer: MEDICARE

## 2022-10-11 VITALS
HEART RATE: 60 BPM | TEMPERATURE: 96.9 F | SYSTOLIC BLOOD PRESSURE: 139 MMHG | OXYGEN SATURATION: 98 % | DIASTOLIC BLOOD PRESSURE: 85 MMHG | HEIGHT: 72 IN | WEIGHT: 248 LBS | BODY MASS INDEX: 33.59 KG/M2 | RESPIRATION RATE: 18 BRPM

## 2022-10-11 PROCEDURE — A4217 STERILE WATER/SALINE, 500 ML: HCPCS | Performed by: ORTHOPAEDIC SURGERY

## 2022-10-11 PROCEDURE — 3700000000 HC ANESTHESIA ATTENDED CARE: Performed by: ORTHOPAEDIC SURGERY

## 2022-10-11 PROCEDURE — 7100000011 HC PHASE II RECOVERY - ADDTL 15 MIN: Performed by: ORTHOPAEDIC SURGERY

## 2022-10-11 PROCEDURE — 64721 CARPAL TUNNEL SURGERY: CPT | Performed by: ORTHOPAEDIC SURGERY

## 2022-10-11 PROCEDURE — 2709999900 HC NON-CHARGEABLE SUPPLY: Performed by: ORTHOPAEDIC SURGERY

## 2022-10-11 PROCEDURE — 2580000003 HC RX 258: Performed by: ANESTHESIOLOGY

## 2022-10-11 PROCEDURE — 2500000003 HC RX 250 WO HCPCS: Performed by: NURSE ANESTHETIST, CERTIFIED REGISTERED

## 2022-10-11 PROCEDURE — 7100000010 HC PHASE II RECOVERY - FIRST 15 MIN: Performed by: ORTHOPAEDIC SURGERY

## 2022-10-11 PROCEDURE — 3700000001 HC ADD 15 MINUTES (ANESTHESIA): Performed by: ORTHOPAEDIC SURGERY

## 2022-10-11 PROCEDURE — 3600000003 HC SURGERY LEVEL 3 BASE: Performed by: ORTHOPAEDIC SURGERY

## 2022-10-11 PROCEDURE — 2500000003 HC RX 250 WO HCPCS: Performed by: ORTHOPAEDIC SURGERY

## 2022-10-11 PROCEDURE — 3600000013 HC SURGERY LEVEL 3 ADDTL 15MIN: Performed by: ORTHOPAEDIC SURGERY

## 2022-10-11 PROCEDURE — 6360000002 HC RX W HCPCS: Performed by: NURSE ANESTHETIST, CERTIFIED REGISTERED

## 2022-10-11 PROCEDURE — 2580000003 HC RX 258: Performed by: ORTHOPAEDIC SURGERY

## 2022-10-11 RX ORDER — MAGNESIUM HYDROXIDE 1200 MG/15ML
LIQUID ORAL CONTINUOUS PRN
Status: COMPLETED | OUTPATIENT
Start: 2022-10-11 | End: 2022-10-11

## 2022-10-11 RX ORDER — ONDANSETRON 2 MG/ML
4 INJECTION INTRAMUSCULAR; INTRAVENOUS
Status: DISCONTINUED | OUTPATIENT
Start: 2022-10-11 | End: 2022-10-11 | Stop reason: HOSPADM

## 2022-10-11 RX ORDER — LIDOCAINE HYDROCHLORIDE 20 MG/ML
INJECTION, SOLUTION INFILTRATION; PERINEURAL PRN
Status: DISCONTINUED | OUTPATIENT
Start: 2022-10-11 | End: 2022-10-11 | Stop reason: SDUPTHER

## 2022-10-11 RX ORDER — DIPHENHYDRAMINE HYDROCHLORIDE 50 MG/ML
12.5 INJECTION INTRAMUSCULAR; INTRAVENOUS
Status: DISCONTINUED | OUTPATIENT
Start: 2022-10-11 | End: 2022-10-11 | Stop reason: HOSPADM

## 2022-10-11 RX ORDER — SODIUM CHLORIDE 0.9 % (FLUSH) 0.9 %
5-40 SYRINGE (ML) INJECTION PRN
Status: DISCONTINUED | OUTPATIENT
Start: 2022-10-11 | End: 2022-10-11 | Stop reason: HOSPADM

## 2022-10-11 RX ORDER — LIDOCAINE HYDROCHLORIDE 10 MG/ML
2 INJECTION, SOLUTION INFILTRATION; PERINEURAL
Status: DISCONTINUED | OUTPATIENT
Start: 2022-10-11 | End: 2022-10-11 | Stop reason: HOSPADM

## 2022-10-11 RX ORDER — SODIUM CHLORIDE 9 MG/ML
INJECTION, SOLUTION INTRAVENOUS PRN
Status: DISCONTINUED | OUTPATIENT
Start: 2022-10-11 | End: 2022-10-11 | Stop reason: HOSPADM

## 2022-10-11 RX ORDER — PROPOFOL 10 MG/ML
INJECTION, EMULSION INTRAVENOUS PRN
Status: DISCONTINUED | OUTPATIENT
Start: 2022-10-11 | End: 2022-10-11 | Stop reason: SDUPTHER

## 2022-10-11 RX ORDER — SODIUM CHLORIDE, SODIUM LACTATE, POTASSIUM CHLORIDE, CALCIUM CHLORIDE 600; 310; 30; 20 MG/100ML; MG/100ML; MG/100ML; MG/100ML
INJECTION, SOLUTION INTRAVENOUS CONTINUOUS
Status: DISCONTINUED | OUTPATIENT
Start: 2022-10-11 | End: 2022-10-11 | Stop reason: HOSPADM

## 2022-10-11 RX ORDER — MEPERIDINE HYDROCHLORIDE 50 MG/ML
12.5 INJECTION INTRAMUSCULAR; INTRAVENOUS; SUBCUTANEOUS EVERY 5 MIN PRN
Status: DISCONTINUED | OUTPATIENT
Start: 2022-10-11 | End: 2022-10-11 | Stop reason: HOSPADM

## 2022-10-11 RX ORDER — OXYCODONE HYDROCHLORIDE 5 MG/1
10 TABLET ORAL PRN
Status: DISCONTINUED | OUTPATIENT
Start: 2022-10-11 | End: 2022-10-11 | Stop reason: HOSPADM

## 2022-10-11 RX ORDER — OXYCODONE HYDROCHLORIDE 5 MG/1
5 TABLET ORAL PRN
Status: DISCONTINUED | OUTPATIENT
Start: 2022-10-11 | End: 2022-10-11 | Stop reason: HOSPADM

## 2022-10-11 RX ORDER — LABETALOL HYDROCHLORIDE 5 MG/ML
5 INJECTION, SOLUTION INTRAVENOUS EVERY 10 MIN PRN
Status: DISCONTINUED | OUTPATIENT
Start: 2022-10-11 | End: 2022-10-11 | Stop reason: HOSPADM

## 2022-10-11 RX ORDER — SODIUM CHLORIDE 0.9 % (FLUSH) 0.9 %
5-40 SYRINGE (ML) INJECTION EVERY 12 HOURS SCHEDULED
Status: DISCONTINUED | OUTPATIENT
Start: 2022-10-11 | End: 2022-10-11 | Stop reason: HOSPADM

## 2022-10-11 RX ADMIN — LIDOCAINE HYDROCHLORIDE 50 MG: 20 INJECTION, SOLUTION INFILTRATION; PERINEURAL at 07:22

## 2022-10-11 RX ADMIN — PROPOFOL 20 MG: 10 INJECTION, EMULSION INTRAVENOUS at 07:30

## 2022-10-11 RX ADMIN — PROPOFOL 30 MG: 10 INJECTION, EMULSION INTRAVENOUS at 07:23

## 2022-10-11 RX ADMIN — PROPOFOL 20 MG: 10 INJECTION, EMULSION INTRAVENOUS at 07:32

## 2022-10-11 RX ADMIN — PROPOFOL 20 MG: 10 INJECTION, EMULSION INTRAVENOUS at 07:26

## 2022-10-11 RX ADMIN — SODIUM CHLORIDE, POTASSIUM CHLORIDE, SODIUM LACTATE AND CALCIUM CHLORIDE: 600; 310; 30; 20 INJECTION, SOLUTION INTRAVENOUS at 06:29

## 2022-10-11 RX ADMIN — PROPOFOL 20 MG: 10 INJECTION, EMULSION INTRAVENOUS at 07:34

## 2022-10-11 RX ADMIN — PROPOFOL 20 MG: 10 INJECTION, EMULSION INTRAVENOUS at 07:28

## 2022-10-11 RX ADMIN — PROPOFOL 70 MG: 10 INJECTION, EMULSION INTRAVENOUS at 07:22

## 2022-10-11 ASSESSMENT — PAIN SCALES - GENERAL
PAINLEVEL_OUTOF10: 0

## 2022-10-11 ASSESSMENT — PAIN - FUNCTIONAL ASSESSMENT: PAIN_FUNCTIONAL_ASSESSMENT: 0-10

## 2022-10-11 NOTE — ANESTHESIA PRE PROCEDURE
Department of Anesthesiology  Preprocedure Note       Name:  Doris Martinez   Age:  74 y.o.  :  1950                                          MRN:  [id number]         Date:  10/11/2022      Surgeon: Mike Hurtado):  Crystal Soto MD    Procedure: Procedure(s):  RIGHT CARPAL TUNNEL RELEASE    Medications prior to admission:   Prior to Admission medications    Medication Sig Start Date End Date Taking? Authorizing Provider   candesartan (ATACAND) 32 MG tablet Take 1 tablet by mouth daily 22   MD June Varghese by Does not apply route Diagnosis: arthritis. Expires: 8/3/25. 8/3/22   Jelena Mullen MD   metoprolol tartrate (LOPRESSOR) 50 MG tablet TAKE 1 TABLET BY MOUTH TWICE DAILY 22   Jelena Mullen MD   hydroCHLOROthiazide (HYDRODIURIL) 25 MG tablet TAKE 1 TABLET BY MOUTH DAILY FOR BLOOD PRESSURE 22   Jelena Mullen MD   amLODIPine-atorvastatatin (CADUET) 10-10 MG per tablet TAKE 1 TABLET BY MOUTH EVERY DAY 4/3/22   Jelena Mullen MD   tadalafil (CIALIS) 20 MG tablet Take 1 tablet by mouth daily as needed for Erectile Dysfunction 22   Jelena Mullen MD   potassium chloride (KLOR-CON M) 20 MEQ extended release tablet Take 1 tablet by mouth 2 times daily 12/6/21 3/6/22  Jelena Mullen MD   potassium chloride (KLOR-CON M) 20 MEQ TBCR extended release tablet Take 20 mEq by mouth 2 times daily     Historical Provider, MD   Handicap Placard MISC by Does not apply route Diagnosis: arthritis. Expires: 22. 20   Jelena Mullen MD   aspirin 81 MG EC tablet Take 81 mg by mouth every 48 hours as needed.  5/27/10   Historical Provider, MD       Current medications:    Current Facility-Administered Medications   Medication Dose Route Frequency Provider Last Rate Last Admin    lidocaine 1 % injection 2 mL  2 mL IntraDERmal Once PRN Jhony Harrison MD        lactated ringers infusion   IntraVENous Continuous Jhony Harrison  mL/hr at 10/11/22 1075 New Bag at 10/11/22 6487       Allergies:  No Known Allergies    Problem List:    Patient Active Problem List   Diagnosis Code    Hypertension I10    Dyslipidemia E78.5    Erectile dysfunction N52.9    Knee pain M25.569    Back pain M54.9    Right groin hernia K40.90    Chronic renal disease, stage III ([facility] [address] [[id number]] N18.30       Past Medical History:        Diagnosis Date    Arthritis     knees, R hip    Back pain     Dyslipidemia     ED (erectile dysfunction)     HTN (hypertension)     Obesities     Vitamin D deficiency        Past Surgical History:        Procedure Laterality Date    COLONOSCOPY  9/3/2021    COLONOSCOPY WITH BIOPSY performed by Teresa Bella MD at [address] [place] [address] REPR,RECUR N/A 10/9/2018    LAPAROSCOPIC REPAIR OF RECURRENT RIGHT INGUINAL HERNIA performed by Paula Gan MD at [facility] History:    Social History     Tobacco Use    Smoking status: Some Days     Types: Cigars    Smokeless tobacco: Never   Substance Use Topics    Alcohol use:  Yes     Alcohol/week: 5.0 standard drinks     Types: 5 Shots of liquor per week                                Ready to quit: Not Answered  Counseling given: Not Answered      Vital Signs (Current):   Vitals:    10/05/22 1016 10/11/22 0627   BP:  (!) 158/95   Pulse:  64   Resp:  16   Temp:  96.8 °F (36 °C)   SpO2:  98%   Weight: 248 lb (112.5 kg)    Height: 6' (1.829 m)                                               BP Readings from Last 3 Encounters:   10/11/22 (!) 158/95   10/05/22 124/88   08/03/22 124/82       NPO Status: Time of last liquid consumption: 2200                        Time of last solid consumption: 2200                        Date of last liquid consumption: 10/10/22                        Date of last solid food consumption: 10/10/22    BMI:   Wt Readings from Last 3 Encounters: 10/05/22 248 lb (112.5 kg)   10/05/22 232 lb 6.4 oz (105.4 kg)   08/08/22 238 lb (108 kg)     Body mass index is 33.63 kg/m². CBC:   Lab Results   Component Value Date/Time    WBC 4.6 04/15/2022 11:44 AM    RBC 4.58 04/15/2022 11:44 AM    HGB 12.6 04/15/2022 11:44 AM    HCT 38.8 04/15/2022 11:44 AM    MCV 84.7 04/15/2022 11:44 AM    RDW 15.9 04/15/2022 11:44 AM     04/15/2022 11:44 AM       CMP:   Lab Results   Component Value Date/Time     10/05/2022 02:01 PM    K 3.9 10/05/2022 02:01 PM    CL 99 10/05/2022 02:01 PM    CO2 28 10/05/2022 02:01 PM    BUN 19 10/05/2022 02:01 PM    CREATININE 1.3 10/05/2022 02:01 PM    GFRAA >60 10/05/2022 02:01 PM    GFRAA >60 03/13/2013 09:51 AM    AGRATIO 1.2 04/15/2022 11:44 AM    LABGLOM 54 10/05/2022 02:01 PM    GLUCOSE 118 10/05/2022 02:01 PM    PROT 7.1 04/15/2022 11:44 AM    PROT 7.2 03/13/2013 09:51 AM    CALCIUM 9.4 10/05/2022 02:01 PM    BILITOT 0.4 04/15/2022 11:44 AM    ALKPHOS 62 04/15/2022 11:44 AM    AST 26 04/15/2022 11:44 AM    ALT 30 04/15/2022 11:44 AM       POC Tests: No results for input(s): POCGLU, POCNA, POCK, POCCL, POCBUN, POCHEMO, POCHCT in the last 72 hours.     Coags: No results found for: PROTIME, INR, APTT    HCG (If Applicable): No results found for: PREGTESTUR, PREGSERUM, HCG, HCGQUANT     ABGs: No results found for: PHART, PO2ART, MLV9XOU, KET1XYB, BEART, L3VNOKRR     Type & Screen (If Applicable):  No results found for: LABABO, LABRH    Drug/Infectious Status (If Applicable):  No results found for: HIV, HEPCAB    COVID-19 Screening (If Applicable): No results found for: COVID19        Anesthesia Evaluation  Patient summary reviewed no history of anesthetic complications:   Airway: Mallampati: III  TM distance: >3 FB   Neck ROM: full  Mouth opening: > = 3 FB   Dental:          Pulmonary:normal exam  breath sounds clear to auscultation      (-) COPD, asthma and sleep apnea                           Cardiovascular:  Exercise tolerance: good (>4 METS),   (+) hypertension:,     (-) CAD,  angina and  MANNING      Rhythm: regular  Rate: normal                    Neuro/Psych:      (-) seizures, TIA and CVA           GI/Hepatic/Renal:   (+) renal disease: CRI,      (-) GERD and liver disease       Endo/Other:        (-) diabetes mellitus               Abdominal:             Vascular: negative vascular ROS. Other Findings:           Anesthesia Plan      MAC     ASA 2     (I discussed with the patient the risks and benefits of PIV, anesthesia, IV Narcotics, PACU. All questions were answered the patient agrees with the plan and wishes to proceed)  Induction: intravenous.                             Mikel Stock MD   10/11/2022

## 2022-10-11 NOTE — DISCHARGE INSTRUCTIONS
Post-Operative Instructions    Carpal Tunnel Release:    Keep hand elevated with fingers above eye-level to control swelling. Keep hand and bandage clean and dry. Do not change or unwrap bandage. Please leave bandage in place until your follow-up appointment. Maintain finger motion by fully straightening and fully bending fingers and thumb at least once an hour (while awake). This may cause some discomfort, but will not damage surgery. You may use your operated hand for lightweight tasks (e.g. writing, eating, dressing, etc.). NO LIFTING, CARRYING OR HEAVY USE. Most Patients do not have \"Serious Pain\" after this procedure and thus most patients do not require prescription pain medication. You may take over the counter medication (Tylenol, Advil, Aleve, etc.) as needed. If you are unable to tolerate the discomfort after your surgery and the OTC medications do not provide some relief, you may contact our office to discuss other options. .    Please call the office at [phone]  in 24 - 48 hours to schedule a follow up appointment for approximately 7 - 10 days after surgery. Please call the office at [phone]  if you have any questions or problems. Vianney Chong MD    ANESTHESIA DISCHARGE INSTRUCTIONS    You are under the influence of drugs- do not drink alcohol, drive a car, operate machinery(such as power tools, kitchen appliances, etc), sign legal documents, or make any important decisions for 24 hours (or while on pain medications). Children should not ride bikes or [place] or play on gym sets  for 24 hours after surgery. A responsible adult should be with you for 24 hours. Rest at home today- increase activity as tolerated. Progress slowly to a regular diet unless your physician has instructed you otherwise. Drink plenty of water. CALL YOUR DOCTOR IF YOU:  Have moderate to severe nausea or vomiting AND are unable to hold down fluids or prescribed medications.   Have bright red bloody drainage from your dressing that won't stop oozing. Do not get relief with your pain medication    NORMAL (POSSIBLE) SIDE EFFECTS FROM ANESTHESIA:     Confusion, temporary memory loss, delayed reaction times in the first 24 hours  Lightheadedness, dizziness, difficulty focusing, blurred vision  Nausea/vomiting can happen  Shivering, feeling cold, sore throat, cough and muscle aches should stop within 24-48 hours  Trouble urinating - call your surgeon if it has been more than 8 hrs  Bruising or soreness at the IV site - call if it remains red, firm or there is drainage               The following instructions are to be followed if you have a known history or diagnosis of sleep apnea: For all sleep apnea patients:  ? Sleep on your side or sitting up in a chair whenever possible, especially the first 24 hours after surgery. ? Use only medicines prescribed by your doctor. ? Do not drink alcohol. ? If you have a dental device to assist you while at rest, use it at all times for the first 24 hours. For patients using CPAP machines:  ? Use your CPAP machine during all periods of sleep as usual.  ? Use your CPAP machine during all periods of daytime rest while on pain medicines. ** Follow up with your primary care doctor for continued care. IF YOU DO NOT TAKE ALL OF YOUR NARCOTIC PAIN MEDICATION, please dispose of them responsibly. There are drop off boxes in the Emergency Departments 24/7 at both [facility] and [facility]. If these locations are not convenient, other options for discarding them can be found at:  [email or web address]    Hospital or office staff may NOT accept any medications to drop off in the cabinet for you. What is a Surgical Site Infection or  (SSI)? A surgical site infection (SSI) is an infection that occurs after surgery in the part of the body where the surgery took place. Most patients who have surgery do not develop an infection.  However, infections can develop in about 1-3 cases for every 100 patients who have had surgery. Our goal is for you to NOT experience any complications and be completely satisfied with your care! However, some signs or symptoms to look for and report immediately to your doctor are:   1. Fever above 101 degrees    2. Redness and increasing pain around the area  where you had surgery   3. Drainage of cloudy fluid or pus coming from the surgical area    Some of the things we/ you can do to prevent SSI's are:   1. Clean hands with soap and water or an alcohol-based hand rub before and after caring for the operative area. This occurs the day of surgery and for the next 2 weeks. 2.Sometimes you receive an appropriate antibiotic within 60 minutes before your surgery or take one for several days after surgery depending on your surgeon's instructions and/or the type of surgery you are having. 3. Family and/or friends who visit you should NOT touch the surgical wound or dressings until advised by your surgeon. 4. Be sure to elevate and decrease the swelling after your surgery to help prevent infection. 5. If you are a diabetic, you need to closely monitor your blood sugar levels and report any significant increases or changes to your surgeon to help promote the healing process.

## 2022-10-11 NOTE — ANESTHESIA POSTPROCEDURE EVALUATION
Department of Anesthesiology  Postprocedure Note    Patient: César Gamez  MRN: [id number]  YOB: 1950  Date of evaluation: 10/11/2022      Procedure Summary     Date: 10/11/22 Room / Location: [address] Emily Rizzo [address] [place] [address]    Anesthesia Start: 0715 Anesthesia Stop: 5949    Procedure: RIGHT CARPAL TUNNEL RELEASE (Right) Diagnosis:       Right carpal tunnel syndrome      (RIGHT CARPAL TUNNEL SYNDROME)    Surgeons: Zen Degroot MD Responsible Provider: Sofia Hartman MD    Anesthesia Type: MAC ASA Status: 2          Anesthesia Type: No value filed.     Jaskaran Phase I: Jaskaran Score: 10    Jaskaran Phase II: Jaskaran Score: 10      Anesthesia Post Evaluation    Comments: Postoperative Anesthesia Note    Name:    César Gamez  MRN:      [id number]    Patient Vitals in the past 12 hrs:  10/11/22 0805, BP:139/85, Pulse:60, Resp:18, SpO2:98 %  10/11/22 0755, BP:(!) 151/92, Temp:96.9 °F (36.1 °C), Temp src:Temporal, Pulse:59, Resp:18, SpO2:97 %  10/11/22 0745, BP:(!) 133/91, Pulse:57, Resp:18, SpO2:95 %  10/11/22 0737, BP:114/79, Temp:96.8 °F (36 °C), Temp src:Temporal, Pulse:58, Resp:18, SpO2:96 %  10/11/22 0627, BP:(!) 158/95, Temp:96.8 °F (36 °C), Pulse:64, Resp:16, SpO2:98 %     LABS:    CBC  Lab Results       Component                Value               Date/Time                  WBC                      4.6                 04/15/2022 11:44 AM        HGB                      12.6 (L)            04/15/2022 11:44 AM        HCT                      38.8 (L)            04/15/2022 11:44 AM        PLT                      232                 04/15/2022 11:44 AM   RENAL  Lab Results       Component                Value               Date/Time                  NA                       140                 10/05/2022 02:01 PM        K                        3.9                 10/05/2022 02:01 PM        CL                       99                  10/05/2022 02:01 PM        CO2                      28 10/05/2022 02:01 PM        BUN                      19                  10/05/2022 02:01 PM        CREATININE               1.3                 10/05/2022 02:01 PM        GLUCOSE                  118 (H)             10/05/2022 02:01 PM   COAGS  No results found for: PROTIME, INR, APTT    Intake & Output:  @07EAXT@    Nausea & Vomiting:  No    Level of Consciousness:  Awake    Pain Assessment:  Adequate analgesia    Anesthesia Complications:  No apparent anesthetic complications    SUMMARY      Vital signs stable  OK to discharge from Stage I post anesthesia care.   Care transferred from Anesthesiology department on discharge from perioperative area

## 2022-10-11 NOTE — OP NOTE
OPERATIVE REPORT          Patient:  Soni Ritter    YOB: 1950  Date of Service:  10/11/2022   Location:  [facility]    Preoperative Diagnosis:    Right carpal tunnel syndrome    Postoperative Diagnosis:    Same    Procedure:    Right carpal tunnel release      Surgeon:    Tony Lambert. Tab Villa MD    Surgical Assistant:    SPENCER Jones Assistant    Anesthesia:   Local with Sedation    Blood Loss:   Minimal    Complications:  None    Tourniquet Time: 3 minutes     Indications:  Mr. Soni Ritter  is a 74y.o. year-old male with Right carpal tunnel syndrome. I have discussed preoperatively with him  the complications, limitations, expectations, alternatives and risks of surgical care, which he has understood. All of his questions have been fully answered, and he has provided written informed consent to proceed. Procedure:   After written consent was obtained and the proper operative site was identified and marked, Mr. Soni Ritter was brought to the operating room, placed in the supine position on the operating room table with the Right arm extended upon a hand table. Under an appropriate level of sedation, local anesthetic (1% Lidocaine and 1/2% Marcaine both without Epinephrine) was instilled in the planned surgical field. His Right upper extremity was prepped and draped in the usual sterile fashion. After Esmarch exsanguination, the pneumotourniquet was inflated to 250 mm of mercury. A 2 cm longitudinal incision was fashioned at the base of the palm, paralleling the longitudinal thenar crease. Dissection was carried carefully through the subcutaneous tissue identifying and protecting the neurovascular structures. The palmar fascia was incised longitudinally, exposing the transverse carpal ligament. The transverse carpal ligament was incised from its proximal to distal most extent, under direct visualization.  The terminal 2 cm of antebrachial fascia was similarly incised under direct visualization. The contents of the carpal tunnel were inspected and found to be free of mass, lesion or other abnormality. Digital palpation revealed no further constriction about the median nerve. The wound was irrigated copiously with sterile saline for irrigation and the pneumotourniquet was deflated after a period of 3 minutes elevation. The fingers were immediately pink & well perfused. Hemostasis was easily obtained with direct pressure and electrocautery and the wound was closed with interrupted sutures. The wound was dressed with adaptic, dry sterile dressings and a bulky soft hand & wrist dressing was applied. Mr. Lisa Summers  was awakened from light sedation, having tolerated the procedure without apparent complication. He  was returned to the recovery room in stable condition. At the conclusion of the procedure all needle, instrument, and sponge counts were correct. Augustina Birmingham MD   10/11/2022, 7:18 AM

## 2022-10-11 NOTE — H&P
Pre-operative Update of H&P:    I  have seen & examined Mr. Adi Foley related solely to his hand and upper extremity conditions, prior to the scheduled procedure on the date of his surgery. The indications for the planned surgical procedure & and his upper-extremity condition are unchanged.

## 2022-10-14 PROBLEM — G56.00 CARPAL TUNNEL SYNDROME: Status: ACTIVE | Noted: 2022-10-14

## 2022-10-19 ENCOUNTER — TELEPHONE (OUTPATIENT)
Dept: ORTHOPEDIC SURGERY | Age: 72
End: 2022-10-19

## 2022-10-19 NOTE — TELEPHONE ENCOUNTER
CPT: [id number]  BODY PART: left wrist  STATUS: outpatient  LOCATION: [facility]: approved    Submitted online with [employer], approved # [id number]  11/1/22-12/31/22

## 2022-10-21 ENCOUNTER — OFFICE VISIT (OUTPATIENT)
Dept: ORTHOPEDIC SURGERY | Age: 72
End: 2022-10-21

## 2022-10-21 VITALS — WEIGHT: 232 LBS | HEIGHT: 72 IN | RESPIRATION RATE: 16 BRPM | BODY MASS INDEX: 31.42 KG/M2

## 2022-10-21 DIAGNOSIS — G56.00 CARPAL TUNNEL SYNDROME, UNSPECIFIED LATERALITY: Primary | ICD-10-CM

## 2022-10-21 PROCEDURE — 99024 POSTOP FOLLOW-UP VISIT: CPT | Performed by: ORTHOPAEDIC SURGERY

## 2022-10-21 NOTE — PATIENT INSTRUCTIONS
Postoperative Instructions After Carpal Tunnel Release    Dr. Lobito Ivan. Shahram        After bandages are removed one week from surgery, you may chose to wear a small bandage over the incision if you wish, though you do not need to. Keep incision dry until sutures have fully dissolved  or it has been 12 - 14 days since your surgery. Thereafter, you may wash with mild soap and water and shower normally. Work hard on motion of the fingers and wrist, straightening each finger fully and bending each finger fully, bending wrist forward and bending wrist backwards. Do not be concerned if you experience discomfort. This will not damage the surgery. You may begin using the hand as it feels comfortable beginning 12 - 14 days from the day of surgery. You may not feel entirely comfortable gripping or lifting heavy objects for several weeks. You may expect to see some skin peel off around the incision. You may be left with a small area of pink baby skin. This is quite normal.  6. Once your stiches have fully disappeared & skin appears normal, you should begin gently massaging the incision with Vitamin E (may use Vitamin E lotion or contents of Vitamin E capsule). Thank you for choosing [facility] for your Hand and Upper Extremity needs. If we can be of any further assistance to you, please do not hesitate to contact us.     Office Phone Number:  [phone]  or  [phone]

## 2022-10-21 NOTE — PROGRESS NOTES
Mr. Linn Peterson returns today in follow-up of his recent right Carpal Tunnel Release done approximately 1 week ago. He has done well noting mild discomfort and no other reported complications. He notes pre-operative symptoms to be completely resolved at this time. Physical Exam:  Bandage intact and well cared for  Skin incision is healing well, without erythema, drainage or sign of infection. Digital range of motion is without significant limitation. Wrist range of motion is without significant limitation. Sensation is significantly improved from preoperatvely  Vascular examination reveals normal, good capillary refill, and good color. Swelling is minimal.  Sensory and Motor Median Nerve function is intact. Impression:  Mr. Linn Peterson is doing well after recent right Carpal Tunnel Release. Plan:  Mr. Linn Peterson is instructed in work on Active & Passive range of motion of the digits, wrist, & elbow. These modalities were specifically demonstrated to him today. We discussed the appropriateness of gradual resumption of use of the operated hand and the return to normal use as comfort allows. He is given instructions regarding management of the fresh surgical incision and progressive use of desensitization and tissue massage techniques. We discussed the appropriate expectations and timeline for symptom improvement. He is provided a written patient instruction sheet titled: Postoperative Instructions After Carpal Tunnel Release. I have asked Mr. Linn Peterson to follow-up with me or contact me by telephone over the next 2-4 weeks if his symptoms have not fully resolved or if he has not regained full & painless return of function. He is also specifically instructed to return to the office or call for an appointment sooner if his symptoms are changing or worsening prior to that time.

## 2022-10-24 NOTE — PROGRESS NOTES
Hyman Ordoñez    Age 74 y.o.    male    1950    N [id number]    11/1/2022  Arrival Time_____________  OR Time____________[address]     Procedure(s):  LEFT CARPAL TUNNEL RELEASE                      Monitor Anesthesia Care    Surgeon(s):  Renée Lepe, MD       Phone [phone] ([place])     [address]  Cell         Work  _____________________________________________________________________  _____________________________________________________________________  _____________________________________________________________________  _____________________________________________________________________  _____________________________________________________________________    PCP _____________________________ Phone_________________     H&P__________________Bringing      Chart            Epic   DOS      Called________  EKG__________________Bringing      Chart            Epic   DOS      Called________  LAB__________________ Bringing      Chart            Epic   DOS      Called________  Cardiac Clearance_______Bringing      Chart            Epic      DOS      Called________    Cardiologist________________________ Phone___________________________    ? Adventist concerns / Waiver on Chart            [employer]________________  ? Pre-op Instructions Given [place]          _________________________________  ? Directions to Surgery Center                          _________________________________  ? Transportation Home_______________      __________________________________  ?  Crutches/Walker__________________        __________________________________    ________Pre-op Orders   _______Transcribed    _______Wt.  ________Pharmacy          _______SCD  ______VTE     ______[facility]  _______  Surgery Consent    _______  Anesthesia Consent         COVID DATE______________LOCATION________________ RESULT__________

## 2022-10-27 NOTE — PROGRESS NOTES

## 2022-10-27 NOTE — PROGRESS NOTES
Obstructive Sleep Apnea (JOHN) Screening     Patient:  Carlitos Grace    YOB: 1950      Medical Record #:  [id number]                     Date:  10/27/2022     1. Are you a loud and/or regular snorer? []  Yes       [x] No    2. Have you been observed to gasp or stop breathing during sleep? []  Yes       [x] No    3. Do you feel tired or groggy upon awakening or do you awaken with a headache?           []  Yes       [] No    4. Are you often tired or fatigued during the wake time hours? []  Yes       [] No    5. Do you fall asleep sitting, reading, watching TV or driving? []  Yes       [] No    6. Do you often have problems with memory or concentration? []  Yes       [] No    **If patient's score is ? 3 they are considered high risk for JOHN. An Anesthesia provider will evaluate the patient and develop a plan of care the day of surgery. Note:  If the patient's BMI is more than 35 kg m¯² , has neck circumference > 40 cm, and/or high blood pressure the risk is greater (© American Sleep Apnea Association, 2006).

## 2022-11-01 ENCOUNTER — HOSPITAL ENCOUNTER (OUTPATIENT)
Age: 72
Setting detail: OUTPATIENT SURGERY
Discharge: HOME HEALTH | End: 2022-11-01
Attending: ORTHOPAEDIC SURGERY | Admitting: ORTHOPAEDIC SURGERY
Payer: MEDICARE

## 2022-11-01 ENCOUNTER — ANESTHESIA EVENT (OUTPATIENT)
Dept: OPERATING ROOM | Age: 72
End: 2022-11-01
Payer: MEDICARE

## 2022-11-01 ENCOUNTER — ANESTHESIA (OUTPATIENT)
Dept: OPERATING ROOM | Age: 72
End: 2022-11-01
Payer: MEDICARE

## 2022-11-01 VITALS
OXYGEN SATURATION: 98 % | HEIGHT: 72 IN | WEIGHT: 248 LBS | TEMPERATURE: 96.8 F | SYSTOLIC BLOOD PRESSURE: 141 MMHG | RESPIRATION RATE: 18 BRPM | HEART RATE: 55 BPM | BODY MASS INDEX: 33.59 KG/M2 | DIASTOLIC BLOOD PRESSURE: 87 MMHG

## 2022-11-01 PROCEDURE — 2500000003 HC RX 250 WO HCPCS: Performed by: ORTHOPAEDIC SURGERY

## 2022-11-01 PROCEDURE — A4217 STERILE WATER/SALINE, 500 ML: HCPCS | Performed by: ORTHOPAEDIC SURGERY

## 2022-11-01 PROCEDURE — 7100000010 HC PHASE II RECOVERY - FIRST 15 MIN: Performed by: ORTHOPAEDIC SURGERY

## 2022-11-01 PROCEDURE — 2500000003 HC RX 250 WO HCPCS: Performed by: NURSE ANESTHETIST, CERTIFIED REGISTERED

## 2022-11-01 PROCEDURE — 2580000003 HC RX 258: Performed by: ANESTHESIOLOGY

## 2022-11-01 PROCEDURE — 2709999900 HC NON-CHARGEABLE SUPPLY: Performed by: ORTHOPAEDIC SURGERY

## 2022-11-01 PROCEDURE — 7100000011 HC PHASE II RECOVERY - ADDTL 15 MIN: Performed by: ORTHOPAEDIC SURGERY

## 2022-11-01 PROCEDURE — 6360000002 HC RX W HCPCS: Performed by: NURSE ANESTHETIST, CERTIFIED REGISTERED

## 2022-11-01 PROCEDURE — 2580000003 HC RX 258: Performed by: NURSE ANESTHETIST, CERTIFIED REGISTERED

## 2022-11-01 PROCEDURE — 2580000003 HC RX 258: Performed by: ORTHOPAEDIC SURGERY

## 2022-11-01 PROCEDURE — 64721 CARPAL TUNNEL SURGERY: CPT | Performed by: ORTHOPAEDIC SURGERY

## 2022-11-01 PROCEDURE — 3600000003 HC SURGERY LEVEL 3 BASE: Performed by: ORTHOPAEDIC SURGERY

## 2022-11-01 PROCEDURE — 3700000000 HC ANESTHESIA ATTENDED CARE: Performed by: ORTHOPAEDIC SURGERY

## 2022-11-01 RX ORDER — SODIUM CHLORIDE, SODIUM LACTATE, POTASSIUM CHLORIDE, CALCIUM CHLORIDE 600; 310; 30; 20 MG/100ML; MG/100ML; MG/100ML; MG/100ML
INJECTION, SOLUTION INTRAVENOUS CONTINUOUS PRN
Status: DISCONTINUED | OUTPATIENT
Start: 2022-11-01 | End: 2022-11-01 | Stop reason: SDUPTHER

## 2022-11-01 RX ORDER — MAGNESIUM HYDROXIDE 1200 MG/15ML
LIQUID ORAL CONTINUOUS PRN
Status: COMPLETED | OUTPATIENT
Start: 2022-11-01 | End: 2022-11-01

## 2022-11-01 RX ORDER — SODIUM CHLORIDE 9 MG/ML
INJECTION, SOLUTION INTRAVENOUS PRN
Status: DISCONTINUED | OUTPATIENT
Start: 2022-11-01 | End: 2022-11-01 | Stop reason: HOSPADM

## 2022-11-01 RX ORDER — MIDAZOLAM HYDROCHLORIDE 1 MG/ML
2 INJECTION INTRAMUSCULAR; INTRAVENOUS
Status: CANCELLED | OUTPATIENT
Start: 2022-11-01 | End: 2022-11-02

## 2022-11-01 RX ORDER — SODIUM CHLORIDE 0.9 % (FLUSH) 0.9 %
5-40 SYRINGE (ML) INJECTION EVERY 12 HOURS SCHEDULED
Status: CANCELLED | OUTPATIENT
Start: 2022-11-01

## 2022-11-01 RX ORDER — PROPOFOL 10 MG/ML
INJECTION, EMULSION INTRAVENOUS PRN
Status: DISCONTINUED | OUTPATIENT
Start: 2022-11-01 | End: 2022-11-01 | Stop reason: SDUPTHER

## 2022-11-01 RX ORDER — HYDRALAZINE HYDROCHLORIDE 20 MG/ML
10 INJECTION INTRAMUSCULAR; INTRAVENOUS
Status: CANCELLED | OUTPATIENT
Start: 2022-11-01

## 2022-11-01 RX ORDER — SODIUM CHLORIDE 0.9 % (FLUSH) 0.9 %
5-40 SYRINGE (ML) INJECTION EVERY 12 HOURS SCHEDULED
Status: DISCONTINUED | OUTPATIENT
Start: 2022-11-01 | End: 2022-11-01 | Stop reason: HOSPADM

## 2022-11-01 RX ORDER — OXYCODONE HYDROCHLORIDE 5 MG/1
5 TABLET ORAL
Status: CANCELLED | OUTPATIENT
Start: 2022-11-01 | End: 2022-11-02

## 2022-11-01 RX ORDER — MEPERIDINE HYDROCHLORIDE 50 MG/ML
12.5 INJECTION INTRAMUSCULAR; INTRAVENOUS; SUBCUTANEOUS EVERY 5 MIN PRN
Status: CANCELLED | OUTPATIENT
Start: 2022-11-01

## 2022-11-01 RX ORDER — IPRATROPIUM BROMIDE AND ALBUTEROL SULFATE 2.5; .5 MG/3ML; MG/3ML
1 SOLUTION RESPIRATORY (INHALATION)
Status: CANCELLED | OUTPATIENT
Start: 2022-11-01 | End: 2022-11-02

## 2022-11-01 RX ORDER — DIPHENHYDRAMINE HYDROCHLORIDE 50 MG/ML
12.5 INJECTION INTRAMUSCULAR; INTRAVENOUS
Status: CANCELLED | OUTPATIENT
Start: 2022-11-01 | End: 2022-11-02

## 2022-11-01 RX ORDER — ONDANSETRON 2 MG/ML
4 INJECTION INTRAMUSCULAR; INTRAVENOUS
Status: CANCELLED | OUTPATIENT
Start: 2022-11-01 | End: 2022-11-02

## 2022-11-01 RX ORDER — ACETAMINOPHEN 325 MG/1
650 TABLET ORAL
Status: CANCELLED | OUTPATIENT
Start: 2022-11-01 | End: 2022-11-02

## 2022-11-01 RX ORDER — DEXAMETHASONE SODIUM PHOSPHATE 10 MG/ML
4 INJECTION INTRAMUSCULAR; INTRAVENOUS
Status: CANCELLED | OUTPATIENT
Start: 2022-11-01 | End: 2022-11-02

## 2022-11-01 RX ORDER — LIDOCAINE HYDROCHLORIDE 20 MG/ML
INJECTION, SOLUTION EPIDURAL; INFILTRATION; INTRACAUDAL; PERINEURAL PRN
Status: DISCONTINUED | OUTPATIENT
Start: 2022-11-01 | End: 2022-11-01 | Stop reason: SDUPTHER

## 2022-11-01 RX ORDER — SODIUM CHLORIDE, SODIUM LACTATE, POTASSIUM CHLORIDE, CALCIUM CHLORIDE 600; 310; 30; 20 MG/100ML; MG/100ML; MG/100ML; MG/100ML
INJECTION, SOLUTION INTRAVENOUS CONTINUOUS
Status: DISCONTINUED | OUTPATIENT
Start: 2022-11-01 | End: 2022-11-01 | Stop reason: HOSPADM

## 2022-11-01 RX ORDER — SODIUM CHLORIDE 0.9 % (FLUSH) 0.9 %
5-40 SYRINGE (ML) INJECTION PRN
Status: CANCELLED | OUTPATIENT
Start: 2022-11-01

## 2022-11-01 RX ORDER — SODIUM CHLORIDE 0.9 % (FLUSH) 0.9 %
5-40 SYRINGE (ML) INJECTION PRN
Status: DISCONTINUED | OUTPATIENT
Start: 2022-11-01 | End: 2022-11-01 | Stop reason: HOSPADM

## 2022-11-01 RX ORDER — SODIUM CHLORIDE, SODIUM LACTATE, POTASSIUM CHLORIDE, CALCIUM CHLORIDE 600; 310; 30; 20 MG/100ML; MG/100ML; MG/100ML; MG/100ML
INJECTION, SOLUTION INTRAVENOUS CONTINUOUS
Status: CANCELLED | OUTPATIENT
Start: 2022-11-01

## 2022-11-01 RX ORDER — SODIUM CHLORIDE 9 MG/ML
INJECTION, SOLUTION INTRAVENOUS PRN
Status: CANCELLED | OUTPATIENT
Start: 2022-11-01

## 2022-11-01 RX ADMIN — PROPOFOL 200 MG: 10 INJECTION, EMULSION INTRAVENOUS at 10:41

## 2022-11-01 RX ADMIN — SODIUM CHLORIDE, POTASSIUM CHLORIDE, SODIUM LACTATE AND CALCIUM CHLORIDE: 600; 310; 30; 20 INJECTION, SOLUTION INTRAVENOUS at 10:36

## 2022-11-01 RX ADMIN — LIDOCAINE HYDROCHLORIDE 100 MG: 20 INJECTION, SOLUTION EPIDURAL; INFILTRATION; INTRACAUDAL; PERINEURAL at 10:41

## 2022-11-01 RX ADMIN — SODIUM CHLORIDE, SODIUM LACTATE, POTASSIUM CHLORIDE, AND CALCIUM CHLORIDE: .6; .31; .03; .02 INJECTION, SOLUTION INTRAVENOUS at 10:39

## 2022-11-01 ASSESSMENT — PAIN - FUNCTIONAL ASSESSMENT: PAIN_FUNCTIONAL_ASSESSMENT: 0-10

## 2022-11-01 NOTE — DISCHARGE INSTRUCTIONS
Post-Operative Instructions    Carpal Tunnel Release:    Keep hand elevated with fingers above eye-level to control swelling. Keep hand and bandage clean and dry. Do not change or unwrap bandage. Please leave bandage in place until your follow-up appointment. Maintain finger motion by fully straightening and fully bending fingers and thumb at least once an hour (while awake). This may cause some discomfort, but will not damage surgery. You may use your operated hand for lightweight tasks (e.g. writing, eating, dressing, etc.). NO LIFTING, CARRYING OR HEAVY USE. Most Patients do not have \"Serious Pain\" after this procedure and thus most patients do not require prescription pain medication. You may take over the counter medication (Tylenol, Advil, Aleve, etc.) as needed. If you are unable to tolerate the discomfort after your surgery and the OTC medications do not provide some relief, you may contact our office to discuss other options. .    Please call the office at [phone]  in 24 - 48 hours to schedule a follow up appointment for approximately 7 - 10 days after surgery. Please call the office at [phone]  if you have any questions or problems. Gus Walker MD    ANESTHESIA DISCHARGE INSTRUCTIONS    You are under the influence of drugs- do not drink alcohol, drive a car, operate machinery(such as power tools, kitchen appliances, etc), sign legal documents, or make any important decisions for 24 hours (or while on pain medications). Children should not ride bikes or [place] or play on gym sets  for 24 hours after surgery. A responsible adult should be with you for 24 hours. Rest at home today- increase activity as tolerated. Progress slowly to a regular diet unless your physician has instructed you otherwise. Drink plenty of water. CALL YOUR DOCTOR IF YOU:  Have moderate to severe nausea or vomiting AND are unable to hold down fluids or prescribed medications.   Have bright red bloody drainage from your dressing that won't stop oozing. Do not get relief with your pain medication    NORMAL (POSSIBLE) SIDE EFFECTS FROM ANESTHESIA:     Confusion, temporary memory loss, delayed reaction times in the first 24 hours  Lightheadedness, dizziness, difficulty focusing, blurred vision  Nausea/vomiting can happen  Shivering, feeling cold, sore throat, cough and muscle aches should stop within 24-48 hours  Trouble urinating - call your surgeon if it has been more than 8 hrs  Bruising or soreness at the IV site - call if it remains red, firm or there is drainage             FEMALES OF CHILDBEARING AGE WHO ARE TAKING BIRTH CONTROL PILLS:  You may have received a medication during your procedure that interferes with the   actions of birth control pills (Bridion or Emend). Use some other kind of birth control in addition to your pills, like a condom, for 1 month after your procedure to prevent unwanted pregnancy. The following instructions are to be followed if you have a known history or diagnosis of sleep apnea: For all sleep apnea patients:  ? Sleep on your side or sitting up in a chair whenever possible, especially the first 24 hours after surgery. ? Use only medicines prescribed by your doctor. ? Do not drink alcohol. ? If you have a dental device to assist you while at rest, use it at all times for the first 24 hours. For patients using CPAP machines:  ? Use your CPAP machine during all periods of sleep as usual.  ? Use your CPAP machine during all periods of daytime rest while on pain medicines. ** Follow up with your primary care doctor for continued care. IF YOU DO NOT TAKE ALL OF YOUR NARCOTIC PAIN MEDICATION, please dispose of them responsibly. There are drop off boxes in the Emergency Departments 24/7 at both [facility]. If these locations are not convenient, other options for discarding them can be found at:  [email or web address]    Hospital or office staff may NOT accept any medications to drop off in the cabinet for you. What is a Surgical Site Infection or  (SSI)? A surgical site infection (SSI) is an infection that occurs after surgery in the part of the body where the surgery took place. Most patients who have surgery do not develop an infection. However, infections can develop in about 1-3 cases for every 100 patients who have had surgery. Our goal is for you to NOT experience any complications and be completely satisfied with your care! However, some signs or symptoms to look for and report immediately to your doctor are:   1. Fever above 101 degrees    2. Redness and increasing pain around the area  where you had surgery   3. Drainage of cloudy fluid or pus coming from the surgical area    Some of the things we/ you can do to prevent SSI's are:   1. Clean hands with soap and water or an alcohol-based hand rub before and after caring for the operative area. This occurs the day of surgery and for the next 2 weeks. 2.Sometimes you receive an appropriate antibiotic within 60 minutes before your surgery or take one for several days after surgery depending on your surgeon's instructions and/or the type of surgery you are having. 3. Family and/or friends who visit you should NOT touch the surgical wound or dressings until advised by your surgeon. 4. Be sure to elevate and decrease the swelling after your surgery to help prevent infection. 5. If you are a diabetic, you need to closely monitor your blood sugar levels and report any significant increases or changes to your surgeon to help promote the healing process.

## 2022-11-01 NOTE — H&P
Pre-operative Update of H&P:    I  have seen & examined Mr. Roman Villatoro related solely to his hand and upper extremity conditions, prior to the scheduled procedure on the date of his surgery. The indications for the planned surgical procedure & and his upper-extremity condition are unchanged.

## 2022-11-01 NOTE — ANESTHESIA POSTPROCEDURE EVALUATION
Department of Anesthesiology  Postprocedure Note    Patient: Marco A Levin  MRN: [id number]  YOB: 1950  Date of evaluation: 11/1/2022      Procedure Summary     Date: 11/01/22 Room / Location: [facility] [address] [facility] [address] / [facility]    Anesthesia Start: 1039 Anesthesia Stop: 1054    Procedure: LEFT CARPAL TUNNEL RELEASE (Left) Diagnosis:       Left carpal tunnel syndrome      (LEFT CARPAL TUNNEL SYNDROME)    Surgeons: Andrea Ruelas MD Responsible Provider: Martha Dodd MD    Anesthesia Type: MAC ASA Status: 2          Anesthesia Type: No value filed.     Jaskaran Phase I: Jaskaran Score: 10    Jaskaran Phase II: Jaskaran Score: 9      Anesthesia Post Evaluation    Patient location during evaluation: PACU  Patient participation: complete - patient participated  Level of consciousness: awake  Pain score: 0  Airway patency: patent  Nausea & Vomiting: no nausea  Complications: no  Cardiovascular status: blood pressure returned to baseline  Respiratory status: acceptable  Hydration status: euvolemic

## 2022-11-01 NOTE — OP NOTE
OPERATIVE REPORT          Patient:  Axel Guzman    YOB: 1950  Date of Service:  11/1/2022   Location:  [facility]    Preoperative Diagnosis:    Left carpal tunnel syndrome    Postoperative Diagnosis:    Same    Procedure:    Left carpal tunnel release      Surgeon:    Delmis Snyder. Fina Centeno MD    Surgical Assistant:    SPENCER Jones Assistant    Anesthesia:   Local with Sedation    Blood Loss:   Minimal    Complications:  None    Tourniquet Time: 2 minutes     Indications:  Mr. Axel Guzman  is a 74y.o. year-old male with Left carpal tunnel syndrome. I have discussed preoperatively with him  the complications, limitations, expectations, alternatives and risks of surgical care, which he has understood. All of his questions have been fully answered, and he has provided written informed consent to proceed. Procedure:   After written consent was obtained and the proper operative site was identified and marked, Mr. Axel Guzman was brought to the operating room, placed in the supine position on the operating room table with the Left arm extended upon a hand table. Under an appropriate level of sedation, local anesthetic (1% Lidocaine and 1/2% Marcaine both without Epinephrine) was instilled in the planned surgical field. His Left upper extremity was prepped and draped in the usual sterile fashion. After Esmarch exsanguination, the pneumotourniquet was inflated to 250 mm of mercury. A 2 cm longitudinal incision was fashioned at the base of the palm, paralleling the longitudinal thenar crease. Dissection was carried carefully through the subcutaneous tissue identifying and protecting the neurovascular structures. The palmar fascia was incised longitudinally, exposing the transverse carpal ligament. The transverse carpal ligament was incised from its proximal to distal most extent, under direct visualization.  The terminal 2 cm of antebrachial fascia was similarly incised under direct visualization. The contents of the carpal tunnel were inspected and found to be free of mass, lesion or other abnormality. Digital palpation revealed no further constriction about the median nerve. The wound was irrigated copiously with sterile saline for irrigation and the pneumotourniquet was deflated after a period of 2 minutes elevation. The fingers were immediately pink & well perfused. Hemostasis was easily obtained with direct pressure and electrocautery and the wound was closed with interrupted sutures. The wound was dressed with adaptic, dry sterile dressings and a bulky soft hand & wrist dressing was applied. Mr. Espinal Daily  was awakened from light sedation, having tolerated the procedure without apparent complication. He  was returned to the recovery room in stable condition. At the conclusion of the procedure all needle, instrument, and sponge counts were correct. Adenike YANG [facility], MD   11/1/2022, 10:40 AM

## 2022-11-01 NOTE — ANESTHESIA PRE PROCEDURE
Department of Anesthesiology  Preprocedure Note       Name:  Carola Lehman   Age:  74 y.o.  :  1950                                          MRN:  [id number]         Date:  2022      Surgeon: Juana Zimmerman):  Sandhya Solis MD    Procedure: Procedure(s):  LEFT CARPAL TUNNEL RELEASE    Medications prior to admission:   Prior to Admission medications    Medication Sig Start Date End Date Taking? Authorizing Provider   candesartan (ATACAND) 32 MG tablet Take 1 tablet by mouth daily 22   Lindalou Ahumada, MD Handicap Placard MISC by Does not apply route Diagnosis: arthritis. Expires: 8/3/25. 8/3/22   Lindalou Ahumada, MD   metoprolol tartrate (LOPRESSOR) 50 MG tablet TAKE 1 TABLET BY MOUTH TWICE DAILY 22   Lindalou Ahumada, MD   hydroCHLOROthiazide (HYDRODIURIL) 25 MG tablet TAKE 1 TABLET BY MOUTH DAILY FOR BLOOD PRESSURE 22   Lindalou Ahumada, MD   amLODIPine-atorvastatatin (CADUET) 10-10 MG per tablet TAKE 1 TABLET BY MOUTH EVERY DAY 4/3/22   Lindalou Ahumada, MD   tadalafil (CIALIS) 20 MG tablet Take 1 tablet by mouth daily as needed for Erectile Dysfunction 22   Lindalou Ahumada, MD   potassium chloride (KLOR-CON M) 20 MEQ TBCR extended release tablet Take 20 mEq by mouth 2 times daily     Historical Provider, MD   Handicap Placard MISC by Does not apply route Diagnosis: arthritis. Expires: 22. 20   Lindalou Ahumada, MD   aspirin 81 MG EC tablet Take 81 mg by mouth every 48 hours as needed.  5/27/10   Historical Provider, MD       Current medications:    Current Facility-Administered Medications   Medication Dose Route Frequency Provider Last Rate Last Admin    lactated ringers infusion   IntraVENous Continuous Donna Ochoa MD        sodium chloride flush 0.9 % injection 5-40 mL  5-40 mL IntraVENous 2 times per day Donna Ochoa MD        sodium chloride flush 0.9 % injection 5-40 mL  5-40 mL IntraVENous PRN Donna Ochoa MD        0.9 % sodium chloride infusion   IntraVENous PRN Don Hernandez MD           Allergies:  No Known Allergies    Problem List:    Patient Active Problem List   Diagnosis Code    Hypertension I10    Dyslipidemia E78.5    Erectile dysfunction N52.9    Knee pain M25.569    Back pain M54.9    Right groin hernia K40.90    Chronic renal disease, stage III ([facility] [address] [135784] N18.30    Carpal tunnel syndrome G56.00       Past Medical History:        Diagnosis Date    Arthritis     knees, R hip    Back pain     Dyslipidemia     ED (erectile dysfunction)     HTN (hypertension)     Obesities     Vitamin D deficiency        Past Surgical History:        Procedure Laterality Date    CARPAL TUNNEL RELEASE Right 10/11/2022    RIGHT CARPAL TUNNEL RELEASE performed by Jayde Desai MD at [address] [place] [address] Drive  9/3/2021    COLONOSCOPY WITH BIOPSY performed by Billie Cisse MD at [address] [place] [address] REPR,RECUR N/A 10/9/2018    LAPAROSCOPIC REPAIR OF RECURRENT RIGHT INGUINAL HERNIA performed by Sandro Hummel MD at [facility] History:    Social History     Tobacco Use    Smoking status: Some Days     Types: Cigars    Smokeless tobacco: Never   Substance Use Topics    Alcohol use:  Yes     Alcohol/week: 5.0 standard drinks     Types: 5 Shots of liquor per week                                Ready to quit: Not Answered  Counseling given: Not Answered      Vital Signs (Current):   Vitals:    10/27/22 1017 11/01/22 1034   BP:  (!) 154/100   Pulse:  63   Resp:  16   Temp:  98 °F (36.7 °C)   SpO2:  96%   Weight: 248 lb (112.5 kg)    Height: 6' (1.829 m)                                               BP Readings from Last 3 Encounters:   11/01/22 (!) 154/100   10/11/22 139/85   10/05/22 124/88       NPO Status: Time of last liquid consumption: 2200                        Time of last solid consumption: 2200 Date of last liquid consumption: 10/31/22                        Date of last solid food consumption: 10/31/22    BMI:   Wt Readings from Last 3 Encounters:   10/27/22 248 lb (112.5 kg)   10/21/22 232 lb (105.2 kg)   10/05/22 248 lb (112.5 kg)     Body mass index is 33.63 kg/m². CBC:   Lab Results   Component Value Date/Time    WBC 4.6 04/15/2022 11:44 AM    RBC 4.58 04/15/2022 11:44 AM    HGB 12.6 04/15/2022 11:44 AM    HCT 38.8 04/15/2022 11:44 AM    MCV 84.7 04/15/2022 11:44 AM    RDW 15.9 04/15/2022 11:44 AM     04/15/2022 11:44 AM       CMP:   Lab Results   Component Value Date/Time     10/05/2022 02:01 PM    K 3.9 10/05/2022 02:01 PM    CL 99 10/05/2022 02:01 PM    CO2 28 10/05/2022 02:01 PM    BUN 19 10/05/2022 02:01 PM    CREATININE 1.3 10/05/2022 02:01 PM    GFRAA >60 10/05/2022 02:01 PM    GFRAA >60 03/13/2013 09:51 AM    AGRATIO 1.2 04/15/2022 11:44 AM    LABGLOM 54 10/05/2022 02:01 PM    GLUCOSE 118 10/05/2022 02:01 PM    PROT 7.1 04/15/2022 11:44 AM    PROT 7.2 03/13/2013 09:51 AM    CALCIUM 9.4 10/05/2022 02:01 PM    BILITOT 0.4 04/15/2022 11:44 AM    ALKPHOS 62 04/15/2022 11:44 AM    AST 26 04/15/2022 11:44 AM    ALT 30 04/15/2022 11:44 AM       POC Tests: No results for input(s): POCGLU, POCNA, POCK, POCCL, POCBUN, POCHEMO, POCHCT in the last 72 hours.     Coags: No results found for: PROTIME, INR, APTT    HCG (If Applicable): No results found for: PREGTESTUR, PREGSERUM, HCG, HCGQUANT     ABGs: No results found for: PHART, PO2ART, IPD3HOB, VKR5MGY, BEART, L1CQLQVX     Type & Screen (If Applicable):  No results found for: LABABO, LABRH    Drug/Infectious Status (If Applicable):  No results found for: HIV, HEPCAB    COVID-19 Screening (If Applicable): No results found for: COVID19        Anesthesia Evaluation  Patient summary reviewed and Nursing notes reviewed  Airway: Mallampati: II  TM distance: >3 FB   Neck ROM: full  Mouth opening: > = 3 FB   Dental: normal exam Pulmonary:Negative Pulmonary ROS and normal exam                               Cardiovascular:    (+) hypertension:,                   Neuro/Psych:   (+) neuromuscular disease:,             GI/Hepatic/Renal: Neg GI/Hepatic/Renal ROS            Endo/Other: Negative Endo/Other ROS                    Abdominal:             Vascular: negative vascular ROS. Other Findings:           Anesthesia Plan      MAC     ASA 2       Induction: intravenous. MIPS: Postoperative opioids intended. Anesthetic plan and risks discussed with patient. Plan discussed with CRNA.     Attending anesthesiologist reviewed and agrees with Preprocedure content                NAHID Herrera MD   11/1/2022

## 2022-11-11 ENCOUNTER — OFFICE VISIT (OUTPATIENT)
Dept: ORTHOPEDIC SURGERY | Age: 72
End: 2022-11-11

## 2022-11-11 VITALS — WEIGHT: 248 LBS | BODY MASS INDEX: 33.59 KG/M2 | HEIGHT: 72 IN | RESPIRATION RATE: 16 BRPM

## 2022-11-11 DIAGNOSIS — G56.00 CARPAL TUNNEL SYNDROME, UNSPECIFIED LATERALITY: Primary | ICD-10-CM

## 2022-11-11 PROCEDURE — 99024 POSTOP FOLLOW-UP VISIT: CPT | Performed by: ORTHOPAEDIC SURGERY

## 2022-11-11 NOTE — PATIENT INSTRUCTIONS
Postoperative Instructions After Carpal Tunnel Release    Dr. Deana Omalley        After bandages are removed one week from surgery, you may chose to wear a small bandage over the incision if you wish, though you do not need to. Keep incision dry until sutures have fully dissolved  or it has been 12 - 14 days since your surgery. Thereafter, you may wash with mild soap and water and shower normally. Work hard on motion of the fingers and wrist, straightening each finger fully and bending each finger fully, bending wrist forward and bending wrist backwards. Do not be concerned if you experience discomfort. This will not damage the surgery. You may begin using the hand as it feels comfortable beginning 12 - 14 days from the day of surgery. You may not feel entirely comfortable gripping or lifting heavy objects for several weeks. You may expect to see some skin peel off around the incision. You may be left with a small area of pink baby skin. This is quite normal.  6. Once your stiches have fully disappeared & skin appears normal, you should begin gently massaging the incision with Vitamin E (may use Vitamin E lotion or contents of Vitamin E capsule). Thank you for choosing [facility]) Physicians for your Hand and Upper Extremity needs. If we can be of any further assistance to you, please do not hesitate to contact us.     Office Phone Number:  [phone]  or  [phone]

## 2022-11-11 NOTE — PROGRESS NOTES
Mr. Dasia Montes returns today in follow-up of his recent left Carpal Tunnel Release done approximately 1 week ago. He has done well noting mild discomfort and no other reported complications. He notes pre-operative symptoms to be significantly improved at this time. Physical Exam:  Bandage intact and well cared for  Skin incision is healing well, without erythema, drainage or sign of infection. Digital range of motion is without significant limitation. Wrist range of motion is without significant limitation. Sensation is significantly improved from preoperatvely  Vascular examination reveals normal, good capillary refill, and good color. Swelling is minimal.  Sensory and Motor Median Nerve function is intact. Impression:  Mr. Dasia Montes is doing well after recent left Carpal Tunnel Release. Plan:  Mr. Dasia Montes is instructed in work on Active & Passive range of motion of the digits, wrist, & elbow. These modalities were specifically demonstrated to him today. We discussed the appropriateness of gradual resumption of use of the operated hand and the return to normal use as comfort allows. He is given instructions regarding management of the fresh surgical incision and progressive use of desensitization and tissue massage techniques. We discussed the appropriate expectations and timeline for symptom improvement. He is provided a written patient instruction sheet titled: Postoperative Instructions After Carpal Tunnel Release. I have asked Mr. Dasia Montes to follow-up with me or contact me by telephone over the next 2-4 weeks if his symptoms have not fully resolved or if he has not regained full & painless return of function. He is also specifically instructed to return to the office or call for an appointment sooner if his symptoms are changing or worsening prior to that time.

## 2022-11-27 DIAGNOSIS — E87.6 HYPOKALEMIA: Primary | ICD-10-CM

## 2022-11-28 NOTE — TELEPHONE ENCOUNTER
Medication:   Requested Prescriptions     Pending Prescriptions Disp Refills    potassium chloride (KLOR-CON M) 20 MEQ extended release tablet [Pharmacy Med Name: POTASSIUM CL 20MEQ ER TABLETS] 180 tablet 3     Sig: TAKE 1 TABLET BY MOUTH TWICE DAILY        Last Filled: 09/03/21    Patient Phone Number: [phone] (home)     Last appt: 10/5/2022   Next appt: 1/10/2023

## 2022-11-29 RX ORDER — POTASSIUM CHLORIDE 20 MEQ/1
TABLET, EXTENDED RELEASE ORAL
Qty: 180 TABLET | Refills: 3 | Status: SHIPPED | OUTPATIENT
Start: 2022-11-29

## 2023-01-10 ENCOUNTER — OFFICE VISIT (OUTPATIENT)
Dept: INTERNAL MEDICINE CLINIC | Age: 73
End: 2023-01-10

## 2023-01-10 VITALS
SYSTOLIC BLOOD PRESSURE: 120 MMHG | DIASTOLIC BLOOD PRESSURE: 84 MMHG | HEART RATE: 68 BPM | OXYGEN SATURATION: 96 % | HEIGHT: 72 IN | BODY MASS INDEX: 31.1 KG/M2 | WEIGHT: 229.6 LBS

## 2023-01-10 DIAGNOSIS — M19.90 ARTHRITIS: ICD-10-CM

## 2023-01-10 DIAGNOSIS — N18.31 STAGE 3A CHRONIC KIDNEY DISEASE (HCC): ICD-10-CM

## 2023-01-10 DIAGNOSIS — I10 PRIMARY HYPERTENSION: ICD-10-CM

## 2023-01-10 DIAGNOSIS — N18.2 CKD (CHRONIC KIDNEY DISEASE) STAGE 2, GFR 60-89 ML/MIN: ICD-10-CM

## 2023-01-10 DIAGNOSIS — R73.03 PRE-DIABETES: Primary | ICD-10-CM

## 2023-01-10 DIAGNOSIS — E66.9 CLASS 1 OBESITY: ICD-10-CM

## 2023-01-10 LAB
CHP ED QC CHECK: NORMAL
GLUCOSE BLD-MCNC: 156 MG/DL
HBA1C MFR BLD: 6.1 %

## 2023-01-10 ASSESSMENT — PATIENT HEALTH QUESTIONNAIRE - PHQ9
DEPRESSION UNABLE TO ASSESS: FUNCTIONAL CAPACITY MOTIVATION LIMITS ACCURACY
SUM OF ALL RESPONSES TO PHQ9 QUESTIONS 1 & 2: 0
SUM OF ALL RESPONSES TO PHQ QUESTIONS 1-9: 0
SUM OF ALL RESPONSES TO PHQ QUESTIONS 1-9: 0
2. FEELING DOWN, DEPRESSED OR HOPELESS: 0
SUM OF ALL RESPONSES TO PHQ QUESTIONS 1-9: 0
SUM OF ALL RESPONSES TO PHQ QUESTIONS 1-9: 0
1. LITTLE INTEREST OR PLEASURE IN DOING THINGS: 0

## 2023-01-10 NOTE — PROGRESS NOTES
Patient: Chloe Wheeler is a 74 y.o. male who presents today with the following Chief Complaint(s):    Chief Complaint   Patient presents with    Follow-up    Hypertension         HPImore aching than stiffness, wrist,  1st MCP joint. All day. No warmth or redness. Some walking   Current Outpatient Medications   Medication Sig Dispense Refill    potassium chloride (KLOR-CON M) 20 MEQ extended release tablet TAKE 1 TABLET BY MOUTH TWICE DAILY 180 tablet 3    candesartan (ATACAND) 32 MG tablet Take 1 tablet by mouth daily 30 tablet 5    Handicap Placard MISC by Does not apply route Diagnosis: arthritis. Expires: 8/3/25. 1 each 0    metoprolol tartrate (LOPRESSOR) 50 MG tablet TAKE 1 TABLET BY MOUTH TWICE DAILY 180 tablet 3    hydroCHLOROthiazide (HYDRODIURIL) 25 MG tablet TAKE 1 TABLET BY MOUTH DAILY FOR BLOOD PRESSURE 90 tablet 3    amLODIPine-atorvastatatin (CADUET) 10-10 MG per tablet TAKE 1 TABLET BY MOUTH EVERY DAY 90 tablet 3    tadalafil (CIALIS) 20 MG tablet Take 1 tablet by mouth daily as needed for Erectile Dysfunction 30 tablet 5    potassium chloride (KLOR-CON M) 20 MEQ TBCR extended release tablet Take 20 mEq by mouth 2 times daily       Handicap Placard MISC by Does not apply route Diagnosis: arthritis. Expires: 1/23/22. 1 each 0    aspirin 81 MG EC tablet Take 81 mg by mouth every 48 hours as needed. No current facility-administered medications for this visit. Patient's past medical history, surgical history, family history, medications,and allergies  were all reviewed and updated as appropriate today. Review of Systems      Physical Exam    Vitals:    01/10/23 1304   BP: (!) 144/80   Pulse: 68   SpO2: 96%       Assessment:  Encounter Diagnosis   Name Primary? Pre-diabetes Yes       Plan:  1.  Pre-diabetes  ***  - POCT Glucose  - POCT glycosylated hemoglobin (Hb A1C)

## 2023-01-14 PROBLEM — N18.2 CKD (CHRONIC KIDNEY DISEASE) STAGE 2, GFR 60-89 ML/MIN: Status: ACTIVE | Noted: 2023-01-14

## 2023-01-15 NOTE — PROGRESS NOTES
Subjective:      Patient ID: Roman Villatoro is a 74 y.o. male. HPI He is here for a check up and f/u on hypertension, hyperlipidemia, elevated BMI, IGT. He is taking medication as prescribed and continues to work on a more balanced meal plan and more physical activity. He has some pain and stiffness mostly at 1st MCP joint. Does           Review of Systems   Constitutional:        Elevated BMI. 31.1. See HPI. HENT: Negative. Eyes: Negative. Respiratory: Negative. Cardiovascular:        HTN, on stable med regimen including Ca, and B blker, ARB, and diuretic. Gastrointestinal: Negative. Endocrine:        Hyperlipidemia, treated with statin. LDL 77. Lpa 73. IGT: controlled with diet. Prior A1c 6.1. Genitourinary: Stage 2 CKD: B/Cr 19/1.2. GFR > 60. Musculoskeletal: Negative. Skin: Negative. Allergic/Immunologic:   Neurological: Negative. Psychiatric/Behavioral: Negative. Objective:   Physical Exam   Constitutional: He is oriented to person, place, and time. He appears well-developed and well-nourished. No distress. HENT:   Head: Normocephalic and atraumatic. Right Ear: External ear normal.   Left Ear: External ear normal.   Nose: Nose normal.   Mouth/Throat: Oropharynx is clear and moist.   Eyes: Pupils are equal, round, and reactive to light. Conjunctivae and EOM are normal. No scleral icterus. Neck: Normal range of motion. Neck supple. No thyromegaly present. Cardiovascular: Normal rate, regular rhythm, normal heart sounds and intact distal pulses. Pulmonary/Chest: Effort normal and breath sounds normal.   Abdominal: Soft. Bowel sounds are normal. He exhibits no mass. Lymphadenopathy:     He has no cervical adenopathy. POPPY: left flexor surface radial aspect ganglion. 1st MCP joint arthritic changes. Neurological: He is alert and oriented to person, place, and time. He has normal reflexes. Skin: Skin is warm and dry.    Psychiatric: He has a normal mood and affect. His behavior is normal. Judgment and thought content normal.       Assessment:        Diagnosis Orders   1. Essential hypertension  Continue same med regimen. DASH diet discussed. 2. BMI 31.1. Lower calories and regular physical activity discussed. 3. Mixed hyperlipidemia  Heart healthy diet and statin compliance discussed. 4.  IGT: diet controlled. Discussed diet, physical activity and weight control. 5.  Osteoarthritis: diet, exercises, weight reduction discussed. 6.  Stage 2 CKD: improved. Risk factor control stressed. Avoidance of nephrotoxins discussed. Plan:    See plans above.

## 2023-03-10 RX ORDER — CANDESARTAN 32 MG/1
32 TABLET ORAL DAILY
Qty: 30 TABLET | Refills: 5 | Status: SHIPPED | OUTPATIENT
Start: 2023-03-10

## 2023-03-10 NOTE — TELEPHONE ENCOUNTER
Medication:   Requested Prescriptions     Pending Prescriptions Disp Refills    candesartan (ATACAND) 32 MG tablet [Pharmacy Med Name: CANDESARTAN 32MG TABLETS] 30 tablet 5     Sig: TAKE 1 TABLET BY MOUTH DAILY     Last Filled:  09/08/2022    Last appt: 1/10/2023   Next appt: 4/11/2023

## 2023-03-24 RX ORDER — AMLODIPINE BESYLATE AND ATORVASTATIN CALCIUM 10; 10 MG/1; MG/1
TABLET, FILM COATED ORAL
Qty: 90 TABLET | Refills: 3 | Status: SHIPPED | OUTPATIENT
Start: 2023-03-24

## 2023-03-24 NOTE — TELEPHONE ENCOUNTER
Medication:   Requested Prescriptions     Pending Prescriptions Disp Refills    amLODIPine-atorvastatatin (CADUET) 10-10 MG per tablet [Pharmacy Med Name: Heriberto Baird 10/10MG TAB] 90 tablet 3     Sig: TAKE 1 TABLET BY MOUTH EVERY DAY     Last Filled:  04/03/2022    Last appt: 1/10/2023   Next appt: 4/11/2023

## 2023-05-01 RX ORDER — METOPROLOL TARTRATE 50 MG/1
TABLET, FILM COATED ORAL
Qty: 180 TABLET | Refills: 3 | Status: SHIPPED | OUTPATIENT
Start: 2023-05-01

## 2023-05-01 RX ORDER — HYDROCHLOROTHIAZIDE 25 MG/1
TABLET ORAL
Qty: 90 TABLET | Refills: 3 | Status: SHIPPED | OUTPATIENT
Start: 2023-05-01

## 2023-06-08 ENCOUNTER — OFFICE VISIT (OUTPATIENT)
Dept: INTERNAL MEDICINE CLINIC | Age: 73
End: 2023-06-08
Payer: MEDICARE

## 2023-06-08 VITALS
DIASTOLIC BLOOD PRESSURE: 88 MMHG | HEART RATE: 55 BPM | HEIGHT: 72 IN | WEIGHT: 232 LBS | OXYGEN SATURATION: 100 % | SYSTOLIC BLOOD PRESSURE: 132 MMHG | BODY MASS INDEX: 31.42 KG/M2

## 2023-06-08 DIAGNOSIS — Z12.5 PROSTATE CANCER SCREENING: ICD-10-CM

## 2023-06-08 DIAGNOSIS — I10 PRIMARY HYPERTENSION: ICD-10-CM

## 2023-06-08 DIAGNOSIS — E78.2 MIXED HYPERLIPIDEMIA: ICD-10-CM

## 2023-06-08 DIAGNOSIS — N18.31 STAGE 3A CHRONIC KIDNEY DISEASE (HCC): ICD-10-CM

## 2023-06-08 DIAGNOSIS — I10 PRIMARY HYPERTENSION: Primary | ICD-10-CM

## 2023-06-08 DIAGNOSIS — E66.9 CLASS 1 OBESITY: ICD-10-CM

## 2023-06-08 DIAGNOSIS — R73.02 IGT (IMPAIRED GLUCOSE TOLERANCE): ICD-10-CM

## 2023-06-08 LAB
BACTERIA URNS QL MICRO: NORMAL /HPF
BASOPHILS # BLD: 0 K/UL (ref 0–0.2)
BASOPHILS NFR BLD: 0.9 %
BILIRUB UR QL STRIP.AUTO: NEGATIVE
CHP ED QC CHECK: NORMAL
CLARITY UR: CLEAR
COLOR UR: YELLOW
DEPRECATED RDW RBC AUTO: 15.4 % (ref 12.4–15.4)
EOSINOPHIL # BLD: 0.2 K/UL (ref 0–0.6)
EOSINOPHIL NFR BLD: 3.3 %
EPI CELLS #/AREA URNS AUTO: 1 /HPF (ref 0–5)
GLUCOSE BLD-MCNC: 114 MG/DL
GLUCOSE UR STRIP.AUTO-MCNC: NEGATIVE MG/DL
HBA1C MFR BLD: 5.7 %
HCT VFR BLD AUTO: 40.6 % (ref 40.5–52.5)
HGB BLD-MCNC: 13.7 G/DL (ref 13.5–17.5)
HGB UR QL STRIP.AUTO: ABNORMAL
HYALINE CASTS #/AREA URNS AUTO: 0 /LPF (ref 0–8)
KETONES UR STRIP.AUTO-MCNC: NEGATIVE MG/DL
LEUKOCYTE ESTERASE UR QL STRIP.AUTO: NEGATIVE
LYMPHOCYTES # BLD: 1.1 K/UL (ref 1–5.1)
LYMPHOCYTES NFR BLD: 18.7 %
MCH RBC QN AUTO: 28.7 PG (ref 26–34)
MCHC RBC AUTO-ENTMCNC: 33.7 G/DL (ref 31–36)
MCV RBC AUTO: 85.2 FL (ref 80–100)
MONOCYTES # BLD: 0.7 K/UL (ref 0–1.3)
MONOCYTES NFR BLD: 11.8 %
NEUTROPHILS # BLD: 3.7 K/UL (ref 1.7–7.7)
NEUTROPHILS NFR BLD: 65.3 %
NITRITE UR QL STRIP.AUTO: NEGATIVE
PH UR STRIP.AUTO: 6 [PH] (ref 5–8)
PLATELET # BLD AUTO: 242 K/UL (ref 135–450)
PMV BLD AUTO: 7.9 FL (ref 5–10.5)
PROT UR STRIP.AUTO-MCNC: NEGATIVE MG/DL
RBC # BLD AUTO: 4.76 M/UL (ref 4.2–5.9)
RBC CLUMPS #/AREA URNS AUTO: 0 /HPF (ref 0–4)
SP GR UR STRIP.AUTO: 1.01 (ref 1–1.03)
UA DIPSTICK W REFLEX MICRO PNL UR: YES
URN SPEC COLLECT METH UR: ABNORMAL
UROBILINOGEN UR STRIP-ACNC: 0.2 E.U./DL
WBC # BLD AUTO: 5.7 K/UL (ref 4–11)
WBC #/AREA URNS AUTO: 0 /HPF (ref 0–5)

## 2023-06-08 PROCEDURE — 1123F ACP DISCUSS/DSCN MKR DOCD: CPT | Performed by: INTERNAL MEDICINE

## 2023-06-08 PROCEDURE — 3074F SYST BP LT 130 MM HG: CPT | Performed by: INTERNAL MEDICINE

## 2023-06-08 PROCEDURE — 3078F DIAST BP <80 MM HG: CPT | Performed by: INTERNAL MEDICINE

## 2023-06-08 PROCEDURE — 83036 HEMOGLOBIN GLYCOSYLATED A1C: CPT | Performed by: INTERNAL MEDICINE

## 2023-06-08 PROCEDURE — 99214 OFFICE O/P EST MOD 30 MIN: CPT | Performed by: INTERNAL MEDICINE

## 2023-06-08 PROCEDURE — 82962 GLUCOSE BLOOD TEST: CPT | Performed by: INTERNAL MEDICINE

## 2023-06-08 NOTE — PATIENT INSTRUCTIONS
[facility] Locations - No appointment necessary. @ indicates the location is open Saturdays in addition to Monday through Friday. Call your preferred location for test preparation, business hours and other information you need. [employer] accepts [employer]. [facility]     @ [address] 5980 [facility], [address]    Ph: 28 [facility] [place], [address]    Ph: [phone]   @ [address] [place] [address].[facility]    Ph: Britta [address]    Ph: [phone]  @ [address]   Ph: [phone]  @ [address] [place] [address]. [facility] [address]    Ph: [address] [place], [place] [address]   Ph: [phone]    [place]    @ [facility]. [place], [place] [address]    Ph: [phone]  [facility]   3280 [facility], [address]   Ph: Ysitie 84 [facility]. [facility] [address] [place] [address] 30: 311 [facility] Sander Escobar    Ph: [phone]   [facility]   [address] 2026 [facility]. Sander Monsalve   Ph: 501 [facility]  [address] [place], [place] [address]    Ph: [phone]

## 2023-06-09 LAB
ALBUMIN SERPL-MCNC: 4.1 G/DL (ref 3.4–5)
ALBUMIN/GLOB SERPL: 1.2 {RATIO} (ref 1.1–2.2)
ALP SERPL-CCNC: 64 U/L (ref 40–129)
ALT SERPL-CCNC: 23 U/L (ref 10–40)
ANION GAP SERPL CALCULATED.3IONS-SCNC: 14 MMOL/L (ref 3–16)
AST SERPL-CCNC: 24 U/L (ref 15–37)
BILIRUB SERPL-MCNC: 0.6 MG/DL (ref 0–1)
BUN SERPL-MCNC: 25 MG/DL (ref 7–20)
CALCIUM SERPL-MCNC: 9.7 MG/DL (ref 8.3–10.6)
CHLORIDE SERPL-SCNC: 98 MMOL/L (ref 99–110)
CHOLEST SERPL-MCNC: 156 MG/DL (ref 0–199)
CO2 SERPL-SCNC: 28 MMOL/L (ref 21–32)
CREAT SERPL-MCNC: 1.4 MG/DL (ref 0.8–1.3)
GFR SERPLBLD CREATININE-BSD FMLA CKD-EPI: 52 ML/MIN/{1.73_M2}
GLUCOSE SERPL-MCNC: 113 MG/DL (ref 70–99)
HDLC SERPL-MCNC: 57 MG/DL (ref 40–60)
LDLC SERPL CALC-MCNC: 79 MG/DL
POTASSIUM SERPL-SCNC: 3.5 MMOL/L (ref 3.5–5.1)
PROT SERPL-MCNC: 7.5 G/DL (ref 6.4–8.2)
PSA SERPL DL<=0.01 NG/ML-MCNC: 0.73 NG/ML (ref 0–4)
SODIUM SERPL-SCNC: 140 MMOL/L (ref 136–145)
TRIGL SERPL-MCNC: 98 MG/DL (ref 0–150)
TSH SERPL DL<=0.005 MIU/L-ACNC: 1.86 UIU/ML (ref 0.27–4.2)
VLDLC SERPL CALC-MCNC: 20 MG/DL

## 2023-06-10 NOTE — PROGRESS NOTES
Subjective:      Patient ID: Nik Connors is a 74 y.o. male. HPI He is here for a check up and f/u on hypertension, hyperlipidemia, elevated BMI, IGT. He is taking medication as prescribed and continues to work on a more balanced meal plan and more physical activity. Review of Systems   Constitutional:        Elevated BMI. 31.5. See HPI. HENT: Negative. Eyes: Negative. Respiratory: Negative. Cardiovascular:        HTN, on stable med regimen including Ca, and B blker, ARB, and diuretic. Gastrointestinal: Negative. Endocrine:        Hyperlipidemia, treated with statin. LDL 79. Lpa 73. IGT: controlled with diet. A1c 5.7. Genitourinary: Stage 3 CKD: B/Cr 25/1.4. GFR 52. Musculoskeletal: Negative. Skin: Negative. Allergic/Immunologic:   Neurological: Negative. Psychiatric/Behavioral: Negative. Objective:   Physical Exam   Constitutional: He is oriented to person, place, and time. He appears well-developed and well-nourished. No distress. HENT:   Head: Normocephalic and atraumatic. Right Ear: External ear normal.   Left Ear: External ear normal.   Nose: Nose normal.   Mouth/Throat: Oropharynx is clear and moist.   Eyes: Pupils are equal, round, and reactive to light. Conjunctivae and EOM are normal. No scleral icterus. Neck: Normal range of motion. Neck supple. No thyromegaly present. Cardiovascular: Normal rate, regular rhythm, normal heart sounds and intact distal pulses. Pulmonary/Chest: Effort normal and breath sounds normal.   Abdominal: Soft. Bowel sounds are normal. He exhibits no mass. Lymphadenopathy:     He has no cervical adenopathy. POPPY: left flexor surface radial aspect ganglion. 1st MCP joint arthritic changes. Neurological: He is alert and oriented to person, place, and time. He has normal reflexes. Skin: Skin is warm and dry. Psychiatric: He has a normal mood and affect.  His behavior is normal. Judgment and
Reflex; Future  - Urinalysis; Future    3. Prostate cancer screening  ***  - PSA Screening; Future    4. Mixed hyperlipidemia  ***  - Lipid Panel;  Future

## 2023-07-06 RX ORDER — CANDESARTAN 32 MG/1
32 TABLET ORAL DAILY
Qty: 90 TABLET | Refills: 3 | Status: SHIPPED | OUTPATIENT
Start: 2023-07-06 | End: 2023-08-14

## 2023-08-14 RX ORDER — CANDESARTAN 32 MG/1
32 TABLET ORAL DAILY
Qty: 90 TABLET | Refills: 3 | Status: SHIPPED | OUTPATIENT
Start: 2023-08-14

## 2023-09-08 ENCOUNTER — OFFICE VISIT (OUTPATIENT)
Dept: INTERNAL MEDICINE CLINIC | Age: 73
End: 2023-09-08
Payer: MEDICARE

## 2023-09-08 ENCOUNTER — TELEPHONE (OUTPATIENT)
Dept: INTERNAL MEDICINE CLINIC | Age: 73
End: 2023-09-08

## 2023-09-08 VITALS — BODY MASS INDEX: 30.24 KG/M2 | DIASTOLIC BLOOD PRESSURE: 78 MMHG | WEIGHT: 223 LBS | SYSTOLIC BLOOD PRESSURE: 122 MMHG

## 2023-09-08 DIAGNOSIS — I10 PRIMARY HYPERTENSION: Primary | ICD-10-CM

## 2023-09-08 DIAGNOSIS — E78.2 MIXED HYPERLIPIDEMIA: ICD-10-CM

## 2023-09-08 DIAGNOSIS — M25.551 RIGHT HIP PAIN: ICD-10-CM

## 2023-09-08 DIAGNOSIS — N18.31 STAGE 3A CHRONIC KIDNEY DISEASE (CKD) (HCC): ICD-10-CM

## 2023-09-08 DIAGNOSIS — R73.02 IGT (IMPAIRED GLUCOSE TOLERANCE): ICD-10-CM

## 2023-09-08 PROCEDURE — 3074F SYST BP LT 130 MM HG: CPT | Performed by: INTERNAL MEDICINE

## 2023-09-08 PROCEDURE — 3078F DIAST BP <80 MM HG: CPT | Performed by: INTERNAL MEDICINE

## 2023-09-08 PROCEDURE — 99214 OFFICE O/P EST MOD 30 MIN: CPT | Performed by: INTERNAL MEDICINE

## 2023-09-08 PROCEDURE — 1123F ACP DISCUSS/DSCN MKR DOCD: CPT | Performed by: INTERNAL MEDICINE

## 2023-09-08 NOTE — PATIENT INSTRUCTIONS
989 [facility] Laboratory Locations - No appointment necessary. ? indicates the location is open Saturdays in addition to Monday through Friday. Call your preferred location for test preparation, business hours and other information you need. [employer] accepts [employer]. [place]  EAST  [place]    ? [place] [id number] [facility]. [facility], [address]    Ph: [address] [place] [facility], [address]    Ph: [phone]   ? [address]    [place], [address] [facility]    Ph: [address] Rigo Staton, [address]    Ph: [phone] ? [place]   [address]   Ph: [phone]  ? 707 [facility], [address] [facility]    Ph: [place] 201 [facility], [address] [facility]   Ph: [phone]    NORTH    ? [facility] [address] D[address]az.[facility] [address]    Ph: [phone]  [facility]   [address] [facility], [address]   Ph: Adelfo Grider. [place], [phone] [facility]: [phone] Sherif Abarca, [facility]5 [facility]    Ph: [address] [facility].  [facility] [address]    Ph: [phone]

## 2023-09-08 NOTE — PROGRESS NOTES
Subjective:      Patient ID: Marguerite Navas is a 74 y.o. male. HPI He is here for a check up and f/u on hypertension, hyperlipidemia, elevated BMI, IGT. He is taking medication as prescribed and continues to focus on a more balanced meal plan and more physical activity. He has achieved a 25 pound weight loss since 11/2022. Eliminated or greatly reduced red meat, sugar, potatoes. Eats fish, turkey, occasional rabbit, green vegetables. Right hip pain. Cannot lay on that side. Arthritis not significantly noted on that side in 2019 xrays. Favors joint with walking especially when first getting up. No trauma or falls reported. Review of Systems   Constitutional:        Elevated BMI. 30.2. See HPI. HENT: Negative. Eyes: Negative. Respiratory: Negative. Cardiovascular:        HTN, on stable med regimen including Ca, and B blker, ARB, and diuretic. Gastrointestinal: Negative. Endocrine:        Hyperlipidemia, treated with statin. LDL 79. Lpa 73. IGT: controlled with diet. A1c 5.7. Genitourinary: Stage 3 CKD: B/Cr 25/1.4. GFR 52. Musculoskeletal: right hip pain. See HPI. Skin: Negative. Allergic/Immunologic:   Neurological: Negative. Psychiatric/Behavioral: Negative. Objective:   Physical Exam   Constitutional: He is oriented to person, place, and time. He appears well-developed and well-nourished. No distress. HENT:   Head: Normocephalic and atraumatic. Right Ear: External ear normal.   Left Ear: External ear normal.   Nose: Nose normal.   Mouth/Throat: Oropharynx is clear and moist.   Eyes: Pupils are equal, round, and reactive to light. Conjunctivae and EOM are normal. No scleral icterus. Neck: Normal range of motion. Neck supple. No thyromegaly present. Cardiovascular: Normal rate, regular rhythm, normal heart sounds and intact distal pulses. Pulmonary/Chest: Effort normal and breath sounds normal.   Abdominal: Soft.  Bowel sounds are

## 2023-09-08 NOTE — PROGRESS NOTES
Patient: Penelope Mcfarlane is a 74 y.o. male who presents today with the following Chief Complaint(s):    Chief Complaint   Patient presents with    Follow-up    Hypertension         HPI25 pound weight loss since 11/2022. Red meat eliminated. , sugar, potatoes. Eats fish, turkey, rabbit, green vegetables. Current Outpatient Medications   Medication Sig Dispense Refill    candesartan (ATACAND) 32 MG tablet TAKE 1 TABLET BY MOUTH DAILY 90 tablet 3    metoprolol tartrate (LOPRESSOR) 50 MG tablet TAKE 1 TABLET BY MOUTH TWICE DAILY 180 tablet 3    hydroCHLOROthiazide (HYDRODIURIL) 25 MG tablet TAKE 1 TABLET BY MOUTH DAILY FOR BLOOD PRESSURE 90 tablet 3    amLODIPine-atorvastatatin (CADUET) 10-10 MG per tablet TAKE 1 TABLET BY MOUTH EVERY DAY 90 tablet 3    potassium chloride (KLOR-CON M) 20 MEQ extended release tablet TAKE 1 TABLET BY MOUTH TWICE DAILY 180 tablet 3    Handicap Placard MISC by Does not apply route Diagnosis: arthritis. Expires: 8/3/25. 1 each 0    tadalafil (CIALIS) 20 MG tablet Take 1 tablet by mouth daily as needed for Erectile Dysfunction 30 tablet 5    potassium chloride (KLOR-CON M) 20 MEQ TBCR extended release tablet Take 1 tablet by mouth 2 times daily      Handicap Placard MISC by Does not apply route Diagnosis: arthritis. Expires: 1/23/22. 1 each 0    aspirin 81 MG EC tablet Take 1 tablet by mouth every 48 hours as needed       No current facility-administered medications for this visit. Patient's past medical history, surgical history, family history, medications,and allergies  were all reviewed and updated as appropriate today. Review of Systems      Physical Exam    Vitals:    09/08/23 0942   BP: 122/78       Assessment:  No diagnosis found. Plan:  There are no diagnoses linked to this encounter.

## 2023-09-26 ENCOUNTER — HOSPITAL ENCOUNTER (OUTPATIENT)
Dept: GENERAL RADIOLOGY | Age: 73
Discharge: HOME | End: 2023-09-26
Payer: MEDICARE

## 2023-09-26 DIAGNOSIS — M25.551 RIGHT HIP PAIN: ICD-10-CM

## 2023-09-26 PROCEDURE — 73502 X-RAY EXAM HIP UNI 2-3 VIEWS: CPT

## 2023-09-27 NOTE — TELEPHONE ENCOUNTER
Medication:   Requested Prescriptions     Pending Prescriptions Disp Refills    candesartan (ATACAND) 32 MG tablet [Pharmacy Med Name: CANDESARTAN 32MG TABLETS] 90 tablet 3     Sig: TAKE 1 TABLET BY MOUTH DAILY        Last Filled:      Patient Phone Number: [phone] (home)     Last appt: 9/8/2023   Next appt: 10/13/2023    Last OARRS:        No data to display

## 2023-09-28 RX ORDER — CANDESARTAN 32 MG/1
32 TABLET ORAL DAILY
Qty: 90 TABLET | Refills: 3 | Status: SHIPPED | OUTPATIENT
Start: 2023-09-28

## 2023-09-29 RX ORDER — CANDESARTAN 32 MG/1
32 TABLET ORAL DAILY
Qty: 30 TABLET | OUTPATIENT
Start: 2023-09-29

## 2023-10-10 SDOH — HEALTH STABILITY: PHYSICAL HEALTH: ON AVERAGE, HOW MANY DAYS PER WEEK DO YOU ENGAGE IN MODERATE TO STRENUOUS EXERCISE (LIKE A BRISK WALK)?: 3 DAYS

## 2023-10-10 SDOH — HEALTH STABILITY: PHYSICAL HEALTH: ON AVERAGE, HOW MANY MINUTES DO YOU ENGAGE IN EXERCISE AT THIS LEVEL?: 50 MIN

## 2023-10-10 ASSESSMENT — LIFESTYLE VARIABLES
HOW OFTEN DURING THE LAST YEAR HAVE YOU FOUND THAT YOU WERE NOT ABLE TO STOP DRINKING ONCE YOU HAD STARTED: 0
HAS A RELATIVE, FRIEND, DOCTOR, OR ANOTHER HEALTH PROFESSIONAL EXPRESSED CONCERN ABOUT YOUR DRINKING OR SUGGESTED YOU CUT DOWN: NO
HOW OFTEN DURING THE LAST YEAR HAVE YOU NEEDED AN ALCOHOLIC DRINK FIRST THING IN THE MORNING TO GET YOURSELF GOING AFTER A NIGHT OF HEAVY DRINKING: NEVER
HOW OFTEN DURING THE LAST YEAR HAVE YOU HAD A FEELING OF GUILT OR REMORSE AFTER DRINKING: 0
HOW OFTEN DURING THE LAST YEAR HAVE YOU FAILED TO DO WHAT WAS NORMALLY EXPECTED FROM YOU BECAUSE OF DRINKING: 0
HOW OFTEN DO YOU HAVE SIX OR MORE DRINKS ON ONE OCCASION: 1
HOW OFTEN DURING THE LAST YEAR HAVE YOU BEEN UNABLE TO REMEMBER WHAT HAPPENED THE NIGHT BEFORE BECAUSE YOU HAD BEEN DRINKING: NEVER
HOW OFTEN DURING THE LAST YEAR HAVE YOU HAD A FEELING OF GUILT OR REMORSE AFTER DRINKING: NEVER
HAVE YOU OR SOMEONE ELSE BEEN INJURED AS A RESULT OF YOUR DRINKING: NO
HOW OFTEN DURING THE LAST YEAR HAVE YOU BEEN UNABLE TO REMEMBER WHAT HAPPENED THE NIGHT BEFORE BECAUSE YOU HAD BEEN DRINKING: 0
HOW OFTEN DURING THE LAST YEAR HAVE YOU FAILED TO DO WHAT WAS NORMALLY EXPECTED FROM YOU BECAUSE OF DRINKING: NEVER
HAVE YOU OR SOMEONE ELSE BEEN INJURED AS A RESULT OF YOUR DRINKING: 0
HOW OFTEN DO YOU HAVE A DRINK CONTAINING ALCOHOL: 5
HAS A RELATIVE, FRIEND, DOCTOR, OR ANOTHER HEALTH PROFESSIONAL EXPRESSED CONCERN ABOUT YOUR DRINKING OR SUGGESTED YOU CUT DOWN: 0
HOW OFTEN DO YOU HAVE A DRINK CONTAINING ALCOHOL: 4 OR MORE TIMES A WEEK
HOW OFTEN DURING THE LAST YEAR HAVE YOU FOUND THAT YOU WERE NOT ABLE TO STOP DRINKING ONCE YOU HAD STARTED: NEVER
HOW OFTEN DURING THE LAST YEAR HAVE YOU NEEDED AN ALCOHOLIC DRINK FIRST THING IN THE MORNING TO GET YOURSELF GOING AFTER A NIGHT OF HEAVY DRINKING: 0
HOW MANY STANDARD DRINKS CONTAINING ALCOHOL DO YOU HAVE ON A TYPICAL DAY: 1
HOW MANY STANDARD DRINKS CONTAINING ALCOHOL DO YOU HAVE ON A TYPICAL DAY: 1 OR 2

## 2023-10-10 ASSESSMENT — PATIENT HEALTH QUESTIONNAIRE - PHQ9
SUM OF ALL RESPONSES TO PHQ QUESTIONS 1-9: 0
SUM OF ALL RESPONSES TO PHQ9 QUESTIONS 1 & 2: 0
2. FEELING DOWN, DEPRESSED OR HOPELESS: 0
SUM OF ALL RESPONSES TO PHQ QUESTIONS 1-9: 0
1. LITTLE INTEREST OR PLEASURE IN DOING THINGS: 0
SUM OF ALL RESPONSES TO PHQ QUESTIONS 1-9: 0
SUM OF ALL RESPONSES TO PHQ QUESTIONS 1-9: 0

## 2023-10-13 ENCOUNTER — OFFICE VISIT (OUTPATIENT)
Dept: INTERNAL MEDICINE CLINIC | Age: 73
End: 2023-10-13
Payer: MEDICARE

## 2023-10-13 VITALS
DIASTOLIC BLOOD PRESSURE: 84 MMHG | WEIGHT: 222.2 LBS | HEIGHT: 73 IN | SYSTOLIC BLOOD PRESSURE: 134 MMHG | OXYGEN SATURATION: 96 % | BODY MASS INDEX: 29.45 KG/M2 | HEART RATE: 68 BPM

## 2023-10-13 DIAGNOSIS — I71.40 ABDOMINAL AORTIC ANEURYSM (AAA) WITHOUT RUPTURE, UNSPECIFIED PART (HCC): ICD-10-CM

## 2023-10-13 DIAGNOSIS — I10 PRIMARY HYPERTENSION: ICD-10-CM

## 2023-10-13 DIAGNOSIS — Z00.00 MEDICARE ANNUAL WELLNESS VISIT, SUBSEQUENT: Primary | ICD-10-CM

## 2023-10-13 DIAGNOSIS — R73.02 IGT (IMPAIRED GLUCOSE TOLERANCE): ICD-10-CM

## 2023-10-13 DIAGNOSIS — E78.2 MIXED HYPERLIPIDEMIA: ICD-10-CM

## 2023-10-13 LAB
CHP ED QC CHECK: NORMAL
GLUCOSE BLD-MCNC: 111 MG/DL
HBA1C MFR BLD: 6 %

## 2023-10-13 PROCEDURE — 3079F DIAST BP 80-89 MM HG: CPT | Performed by: INTERNAL MEDICINE

## 2023-10-13 PROCEDURE — 3075F SYST BP GE 130 - 139MM HG: CPT | Performed by: INTERNAL MEDICINE

## 2023-10-13 PROCEDURE — G0439 PPPS, SUBSEQ VISIT: HCPCS | Performed by: INTERNAL MEDICINE

## 2023-10-13 PROCEDURE — 1123F ACP DISCUSS/DSCN MKR DOCD: CPT | Performed by: INTERNAL MEDICINE

## 2023-10-13 PROCEDURE — 82962 GLUCOSE BLOOD TEST: CPT | Performed by: INTERNAL MEDICINE

## 2023-10-13 PROCEDURE — 83036 HEMOGLOBIN GLYCOSYLATED A1C: CPT | Performed by: INTERNAL MEDICINE

## 2023-10-13 NOTE — PATIENT INSTRUCTIONS
heart attack happens when blood flow is completely blocked. A high-fat diet, smoking, and other factors increase the risk of heart disease. Your doctor has found that you have a chance of having heart disease. You can do lots of things to keep your heart healthy. It may not be easy, but you can change your diet, exercise more, and quit smoking. These steps really work to lower your chance of heart disease. Follow-up care is a key part of your treatment and safety. Be sure to make and go to all appointments, and call your doctor if you are having problems. It's also a good idea to know your test results and keep a list of the medicines you take. How can you care for yourself at home? Diet    Use less salt when you cook and eat. This helps lower your blood pressure. Taste food before salting. Add only a little salt when you think you need it. With time, your taste buds will adjust to less salt. Eat fewer snack items, fast foods, canned soups, and other high-salt, high-fat, processed foods. Read food labels and try to avoid saturated and trans fats. They increase your risk of heart disease by raising cholesterol levels. Limit the amount of solid fat-butter, margarine, and shortening-you eat. Use olive, peanut, or canola oil when you cook. Bake, broil, and steam foods instead of frying them. Eat a variety of fruit and vegetables every day. Dark green, deep orange, red, or yellow fruits and vegetables are especially good for you. Examples include spinach, carrots, peaches, and berries. Foods high in fiber can reduce your cholesterol and provide important vitamins and minerals. High-fiber foods include whole-grain cereals and breads, oatmeal, beans, brown rice, citrus fruits, and apples. Eat lean proteins. Heart-healthy proteins include seafood, lean meats and poultry, eggs, beans, peas, nuts, seeds, and soy products. Limit drinks and foods with added sugar.  These include candy, desserts,

## 2023-10-13 NOTE — PROGRESS NOTES
Medicare Annual Wellness Visit    Romel Pozo is here for Medicare AWV    Assessment & Plan    Diagnosis Orders   1. Medicare annual wellness visit, subsequent  Health and wellness advice given. 2. IGT (impaired glucose tolerance)  POCT Glucose    POCT glycosylated hemoglobin (Hb A1C)  Diet, exercises. And weight reduction discussed. 3. Abdominal aortic aneurysm (AAA) without rupture, unspecified part [facility])  Junito Mcwilliams MD, Vascular/Endovascular Surgery, [facility]  Risk factor control stressed. 4. Primary hypertension  Continue same medication regimen. DASH and low sodium diet discussed. 5. Mixed hyperlipidemia  Heart healthy diet and statin compliance discussed. Recommendations for Preventive Services Due: see orders and patient instructions/AVS.  Recommended screening schedule for the next 5-10 years is provided to the patient in written form: see Patient Instructions/AVS.     No follow-ups on file. Subjective       To do living will. Cigars couple 1 months. No red meat. US aorta: 4.6 cm mid AAA. Right hip and lumbar spine djd noted on xrays. Patient's complete Health Risk Assessment and screening values have been reviewed and are found in Flowsheets. The following problems were reviewed today and where indicated follow up appointments were made and/or referrals ordered. Positive Risk Factor Screenings with Interventions:                    Vision Screen:  Do you have difficulty driving, watching TV, or doing any of your daily activities because of your eyesight?: No  Have you had an eye exam within the past year?: (!) No  No results found. Interventions:  Discussion  Suggestions.        Advanced Directives:  Do you have a Living Will?: (!) No    Intervention:          Tobacco Use:  Tobacco Use: High Risk (10/13/2023)    Patient History     Smoking Tobacco Use: Some Days     Smokeless Tobacco Use: Never     Passive Exposure: Not on

## 2023-10-26 DIAGNOSIS — N52.01 ERECTILE DYSFUNCTION DUE TO ARTERIAL INSUFFICIENCY: ICD-10-CM

## 2023-11-01 RX ORDER — TADALAFIL 20 MG/1
20 TABLET ORAL DAILY PRN
Qty: 30 TABLET | Refills: 5 | Status: SHIPPED | OUTPATIENT
Start: 2023-11-01

## 2023-11-12 NOTE — PROGRESS NOTES
[facility]  Consultation Note    Chief Complaint / Reason for Consultation  AAA    History of Present Illness  Patient is a 74 y.o. male presents today in referral from his primary care provider Dr. Jojo Kirkland for an abdominal aortic aneurysm. Patient underwent ultrasound screening for an abdominal aortic aneurysm in April 2022 with evidence of a 4.6 cm infrarenal aortic aneurysm and mild iliac dilation bilaterally at 1.6 and 1.7 cm. He has a very remote history of tobacco abuse when he was in the service. Will occasionally smoke a cigar. Denies any family history of abdominal aortic aneurysm. Review of Systems     Denies fevers, chills, chest pain, shortness of breath, nausea, vomiting, hematemesis, diarrhea, constipation, melena, hematochezia, wt changes, vision problems, blindness, hearing problems, facial droop, slurred speech, extremity weakness, extremity numbness, dysuria. Past Medical History:   has a past medical history of Arthritis, Back pain, Dyslipidemia, ED (erectile dysfunction), HTN (hypertension), Obesities, and Vitamin D deficiency. Past Surgical History:   has a past surgical history that includes knee surgery; Vasectomy; Cystoscopy; hernia repair (Right, 2007); pr laps surg rpr recurrent inguinal hernia (N/A, 10/9/2018); Colonoscopy (9/3/2021); Carpal tunnel release (Right, 10/11/2022); and Carpal tunnel release (Left, 11/1/2022).      Medications:  Current Outpatient Medications on File Prior to Visit   Medication Sig Dispense Refill    tadalafil (CIALIS) 20 MG tablet Take 1 tablet by mouth daily as needed for Erectile Dysfunction 30 tablet 5    candesartan (ATACAND) 32 MG tablet TAKE 1 TABLET BY MOUTH DAILY 90 tablet 3    metoprolol tartrate (LOPRESSOR) 50 MG tablet TAKE 1 TABLET BY MOUTH TWICE DAILY 180 tablet 3    hydroCHLOROthiazide (HYDRODIURIL) 25 MG tablet TAKE 1 TABLET BY MOUTH DAILY FOR BLOOD PRESSURE 90 tablet 3    amLODIPine-atorvastatatin

## 2023-11-14 ENCOUNTER — OFFICE VISIT (OUTPATIENT)
Dept: VASCULAR SURGERY | Age: 73
End: 2023-11-14
Payer: MEDICARE

## 2023-11-14 VITALS
SYSTOLIC BLOOD PRESSURE: 124 MMHG | WEIGHT: 219 LBS | HEIGHT: 72 IN | BODY MASS INDEX: 29.66 KG/M2 | DIASTOLIC BLOOD PRESSURE: 84 MMHG

## 2023-11-14 DIAGNOSIS — I71.43 INFRARENAL ABDOMINAL AORTIC ANEURYSM (AAA) WITHOUT RUPTURE (HCC): Primary | ICD-10-CM

## 2023-11-14 PROCEDURE — 1123F ACP DISCUSS/DSCN MKR DOCD: CPT | Performed by: SURGERY

## 2023-11-14 PROCEDURE — 3079F DIAST BP 80-89 MM HG: CPT | Performed by: SURGERY

## 2023-11-14 PROCEDURE — 99203 OFFICE O/P NEW LOW 30 MIN: CPT | Performed by: SURGERY

## 2023-11-14 PROCEDURE — 3074F SYST BP LT 130 MM HG: CPT | Performed by: SURGERY

## 2023-11-25 DIAGNOSIS — E87.6 HYPOKALEMIA: ICD-10-CM

## 2023-11-29 DIAGNOSIS — E87.6 HYPOKALEMIA: ICD-10-CM

## 2023-11-29 RX ORDER — POTASSIUM CHLORIDE 20 MEQ/1
TABLET, EXTENDED RELEASE ORAL
Qty: 180 TABLET | Refills: 3 | OUTPATIENT
Start: 2023-11-29

## 2023-11-29 RX ORDER — POTASSIUM CHLORIDE 20 MEQ/1
20 TABLET, EXTENDED RELEASE ORAL 2 TIMES DAILY
Qty: 180 TABLET | Refills: 3 | Status: SHIPPED | OUTPATIENT
Start: 2023-11-29

## 2023-11-29 NOTE — TELEPHONE ENCOUNTER
Medication:   Requested Prescriptions     Pending Prescriptions Disp Refills    potassium chloride (KLOR-CON M) 20 MEQ extended release tablet 180 tablet 3     Sig: Take 1 tablet by mouth 2 times daily        Last Filled:  11/29/22    Patient Phone Number: [phone] (home)     Last appt: 10/13/2023   Next appt: 1/11/2024

## 2023-11-29 NOTE — TELEPHONE ENCOUNTER
Medication:   Requested Prescriptions     Pending Prescriptions Disp Refills    potassium chloride (KLOR-CON M) 20 MEQ extended release tablet [Pharmacy Med Name: POTASSIUM CL 20MEQ ER TABLETS] 180 tablet 3     Sig: TAKE 1 TABLET BY MOUTH TWICE DAILY        Last Filled:  11/29/22    Patient Phone Number: [phone] (home)     Last appt: 10/13/2023   Next appt: 11/29/2023

## 2023-12-05 ENCOUNTER — HOSPITAL ENCOUNTER (OUTPATIENT)
Dept: CT IMAGING | Age: 73
Discharge: HOME | End: 2023-12-05
Attending: SURGERY
Payer: MEDICARE

## 2023-12-05 DIAGNOSIS — I71.43 INFRARENAL ABDOMINAL AORTIC ANEURYSM (AAA) WITHOUT RUPTURE (HCC): ICD-10-CM

## 2023-12-05 LAB
CREAT SERPL-MCNC: 1.3 MG/DL (ref 0.8–1.3)
GFR SERPLBLD CREATININE-BSD FMLA CKD-EPI: 57 ML/MIN/{1.73_M2}

## 2023-12-05 PROCEDURE — 6360000004 HC RX CONTRAST MEDICATION: Performed by: SURGERY

## 2023-12-05 PROCEDURE — 74174 CTA ABD&PLVS W/CONTRAST: CPT

## 2023-12-05 PROCEDURE — 82565 ASSAY OF CREATININE: CPT

## 2023-12-05 PROCEDURE — 36415 COLL VENOUS BLD VENIPUNCTURE: CPT

## 2023-12-05 RX ADMIN — IOPAMIDOL 75 ML: 755 INJECTION, SOLUTION INTRAVENOUS at 16:44

## 2023-12-13 ENCOUNTER — TELEPHONE (OUTPATIENT)
Dept: VASCULAR SURGERY | Age: 73
End: 2023-12-13

## 2023-12-13 NOTE — TELEPHONE ENCOUNTER
Discussed results of CTA abd/pelvis which was normal with no evidence of aneurysm or any abnormality. Follow up as needed with Dr. Emily Villalba.

## 2024-01-11 ENCOUNTER — OFFICE VISIT (OUTPATIENT)
Dept: INTERNAL MEDICINE CLINIC | Age: 74
End: 2024-01-11
Payer: MEDICARE

## 2024-01-11 VITALS
HEART RATE: 87 BPM | SYSTOLIC BLOOD PRESSURE: 128 MMHG | DIASTOLIC BLOOD PRESSURE: 78 MMHG | WEIGHT: 217 LBS | OXYGEN SATURATION: 99 % | BODY MASS INDEX: 29.43 KG/M2

## 2024-01-11 DIAGNOSIS — E78.2 MIXED HYPERLIPIDEMIA: ICD-10-CM

## 2024-01-11 DIAGNOSIS — I10 PRIMARY HYPERTENSION: ICD-10-CM

## 2024-01-11 DIAGNOSIS — N18.31 STAGE 3A CHRONIC KIDNEY DISEASE (CKD) (HCC): ICD-10-CM

## 2024-01-11 DIAGNOSIS — R73.02 IGT (IMPAIRED GLUCOSE TOLERANCE): ICD-10-CM

## 2024-01-11 DIAGNOSIS — E66.3 OVERWEIGHT (BMI 25.0-29.9): ICD-10-CM

## 2024-01-11 DIAGNOSIS — I10 PRIMARY HYPERTENSION: Primary | ICD-10-CM

## 2024-01-11 DIAGNOSIS — I73.9 PAD (PERIPHERAL ARTERY DISEASE) (HCC): ICD-10-CM

## 2024-01-11 DIAGNOSIS — M25.551 RIGHT HIP PAIN: ICD-10-CM

## 2024-01-11 PROBLEM — I71.43 INFRARENAL ABDOMINAL AORTIC ANEURYSM (AAA) WITHOUT RUPTURE (HCC): Status: ACTIVE | Noted: 2024-01-11

## 2024-01-11 PROCEDURE — 3074F SYST BP LT 130 MM HG: CPT | Performed by: INTERNAL MEDICINE

## 2024-01-11 PROCEDURE — 3078F DIAST BP <80 MM HG: CPT | Performed by: INTERNAL MEDICINE

## 2024-01-11 PROCEDURE — 1123F ACP DISCUSS/DSCN MKR DOCD: CPT | Performed by: INTERNAL MEDICINE

## 2024-01-11 PROCEDURE — 99215 OFFICE O/P EST HI 40 MIN: CPT | Performed by: INTERNAL MEDICINE

## 2024-01-11 NOTE — PATIENT INSTRUCTIONS
[facility] Laboratory Locations - No appointment necessary.  ? indicates the location is open Saturdays in addition to Monday through Friday.   Call your preferred location for test preparation, business hours and other information you need.   [facility] accepts all insurances.  CENTRAL  EAST  [place]    ? [place]   [address]   [address]   [place] [address]    Ph: [phone]  [facility]   [address] [place] [address]    Ph: [phone]   ? [place]   [address]    [place] [address]    Ph: [phone]     [facility]   [address] [place] [address]    Ph: [phone] ? [place]   [address] [place] [address]   Ph: [phone]  ? [facility]   [address] [address]    Ph: [phone]      [place]   [address] [place] [address]   Ph: [phone]    [place]    ? [facility]   [address] [place] [address]    Ph: [phone]  [facility]   [address] [place] [address]   Ph: [phone]  [place]   [address] [facility] [address]    PH: [phone]    [facility]   [address] [place] [address]    Ph: [phone]    [facility]   [address] [place] [address]    Ph: [phone]     [employer]. Diet.     [employer] HD[employer].

## 2024-01-12 LAB
ALBUMIN SERPL-MCNC: 4.4 G/DL (ref 3.4–5)
ALBUMIN/GLOB SERPL: 1.4 {RATIO} (ref 1.1–2.2)
ALP SERPL-CCNC: 68 U/L (ref 40–129)
ALT SERPL-CCNC: 22 U/L (ref 10–40)
ANION GAP SERPL CALCULATED.3IONS-SCNC: 12 MMOL/L (ref 3–16)
AST SERPL-CCNC: 24 U/L (ref 15–37)
BASOPHILS # BLD: 0 K/UL (ref 0–0.2)
BASOPHILS NFR BLD: 0.8 %
BILIRUB SERPL-MCNC: 0.7 MG/DL (ref 0–1)
BUN SERPL-MCNC: 20 MG/DL (ref 7–20)
CALCIUM SERPL-MCNC: 9.2 MG/DL (ref 8.3–10.6)
CHLORIDE SERPL-SCNC: 102 MMOL/L (ref 99–110)
CHOLEST SERPL-MCNC: 155 MG/DL (ref 0–199)
CO2 SERPL-SCNC: 29 MMOL/L (ref 21–32)
CREAT SERPL-MCNC: 1.2 MG/DL (ref 0.8–1.3)
CREAT UR-MCNC: 175.5 MG/DL (ref 39–259)
DEPRECATED RDW RBC AUTO: 15.8 % (ref 12.4–15.4)
EOSINOPHIL # BLD: 0.2 K/UL (ref 0–0.6)
EOSINOPHIL NFR BLD: 2.8 %
EST. AVERAGE GLUCOSE BLD GHB EST-MCNC: 116.9 MG/DL
GFR SERPLBLD CREATININE-BSD FMLA CKD-EPI: >60 ML/MIN/{1.73_M2}
GLUCOSE SERPL-MCNC: 131 MG/DL (ref 70–99)
HBA1C MFR BLD: 5.7 %
HCT VFR BLD AUTO: 40.6 % (ref 40.5–52.5)
HDLC SERPL-MCNC: 54 MG/DL (ref 40–60)
HGB BLD-MCNC: 13.4 G/DL (ref 13.5–17.5)
LDLC SERPL CALC-MCNC: 85 MG/DL
LYMPHOCYTES # BLD: 1.1 K/UL (ref 1–5.1)
LYMPHOCYTES NFR BLD: 18.2 %
MCH RBC QN AUTO: 28 PG (ref 26–34)
MCHC RBC AUTO-ENTMCNC: 33 G/DL (ref 31–36)
MCV RBC AUTO: 84.9 FL (ref 80–100)
MICROALBUMIN UR DL<=1MG/L-MCNC: 1.9 MG/DL
MICROALBUMIN/CREAT UR: 10.8 MG/G (ref 0–30)
MONOCYTES # BLD: 0.8 K/UL (ref 0–1.3)
MONOCYTES NFR BLD: 13.1 %
NEUTROPHILS # BLD: 4 K/UL (ref 1.7–7.7)
NEUTROPHILS NFR BLD: 65.1 %
PLATELET # BLD AUTO: 235 K/UL (ref 135–450)
PMV BLD AUTO: 8.5 FL (ref 5–10.5)
POTASSIUM SERPL-SCNC: 3.7 MMOL/L (ref 3.5–5.1)
PROT SERPL-MCNC: 7.5 G/DL (ref 6.4–8.2)
RBC # BLD AUTO: 4.78 M/UL (ref 4.2–5.9)
SODIUM SERPL-SCNC: 143 MMOL/L (ref 136–145)
TRIGL SERPL-MCNC: 82 MG/DL (ref 0–150)
TSH SERPL DL<=0.005 MIU/L-ACNC: 2.17 UIU/ML (ref 0.27–4.2)
VLDLC SERPL CALC-MCNC: 16 MG/DL
WBC # BLD AUTO: 6.2 K/UL (ref 4–11)

## 2024-01-12 NOTE — PROGRESS NOTES
Patient: Shad Kate is a 73 y.o. male who presents today with the following Chief Complaint(s):    Chief Complaint   Patient presents with    Follow-up     PT NEEDS TO KNOW IF HE SHOULD STILL BE TAKING ASPRIN     Hypertension         HPIGAVE UP BEEF AND PORK.     Current Outpatient Medications   Medication Sig Dispense Refill    potassium chloride (KLOR-CON M) 20 MEQ extended release tablet Take 1 tablet by mouth 2 times daily 180 tablet 3    tadalafil (CIALIS) 20 MG tablet Take 1 tablet by mouth daily as needed for Erectile Dysfunction 30 tablet 5    candesartan (ATACAND) 32 MG tablet TAKE 1 TABLET BY MOUTH DAILY 90 tablet 3    metoprolol tartrate (LOPRESSOR) 50 MG tablet TAKE 1 TABLET BY MOUTH TWICE DAILY 180 tablet 3    hydroCHLOROthiazide (HYDRODIURIL) 25 MG tablet TAKE 1 TABLET BY MOUTH DAILY FOR BLOOD PRESSURE 90 tablet 3    amLODIPine-atorvastatatin (CADUET) 10-10 MG per tablet TAKE 1 TABLET BY MOUTH EVERY DAY 90 tablet 3    Handicap Placard MISC by Does not apply route Diagnosis: arthritis.     Expires: 8/3/25. 1 each 0    potassium chloride (KLOR-CON M) 20 MEQ TBCR extended release tablet Take 1 tablet by mouth 2 times daily      Handicap Placard MISC by Does not apply route Diagnosis: arthritis.    Expires: 1/23/22. 1 each 0    aspirin 81 MG EC tablet Take 1 tablet by mouth every 48 hours as needed       No current facility-administered medications for this visit.       Patient's past medical history, surgical history, family history, medications,and allergies  were all reviewed and updated as appropriate today.      Review of Systems      Physical Exam    Vitals:    01/11/24 1044   BP: 128/78   Pulse: 87   SpO2: 99%       Assessment:  No diagnosis found.    Plan:  There are no diagnoses linked to this encounter.  
radial aspect ganglion. 1st MCP joint arthritic changes.    Neurological: He is alert and oriented to person, place, and time. He has normal reflexes.   Skin: Skin is warm and dry.   Psychiatric: He has a normal mood and affect. His behavior is normal. Judgment and thought content normal.       Assessment:        Diagnosis Orders   1. Essential hypertension  Continue same med regimen.   DASH diet discussed.    2. Overweight.  Lower calories and regular physical activity discussed.   3. Mixed hyperlipidemia  Heart healthy diet and statin compliance discussed.    4.  IGT: diet controlled. Discussed diet, physical activity and weight control.   5.  Stage 3 CKD: improved. Risk factor control stressed. Avoidance of nephrotoxins discussed. Referenced NKF diet recommendations.  6.  Right hip pain. Xrays show arthritis. Exercises, continue weight reduction. Tylenol for pain, consider PT and ortho.  7.  Ok to resume baby aspirin.   8.  PAD: risk factor control stressed. Avoidance of nephrotoxins discussed.       Plan:    See plans above.

## 2024-03-08 RX ORDER — AMLODIPINE BESYLATE AND ATORVASTATIN CALCIUM 10; 10 MG/1; MG/1
TABLET, FILM COATED ORAL
Qty: 90 TABLET | Refills: 3 | Status: SHIPPED | OUTPATIENT
Start: 2024-03-08

## 2024-03-08 NOTE — TELEPHONE ENCOUNTER
Medication:   Requested Prescriptions     Pending Prescriptions Disp Refills    amLODIPine-atorvastatatin (CADUET) 10-10 MG per tablet [Pharmacy Med Name: AMLODIPINE-ATORVASTATIN 10/10MG TAB] 90 tablet 3     Sig: TAKE 1 TABLET BY MOUTH EVERY DAY       Last Filled:  12/10/2023     Patient Phone Number: [phone] (home)     Last appt: 1/11/2024   Next appt: 4/11/2024    Last Lipid:   Lab Results   Component Value Date/Time    CHOL 155 01/11/2024 12:14 PM    TRIG 82 01/11/2024 12:14 PM    HDL 54 01/11/2024 12:14 PM    HDL 59 03/01/2012 10:18 AM    LDLCALC 85 01/11/2024 12:14 PM

## 2024-04-08 SDOH — ECONOMIC STABILITY: TRANSPORTATION INSECURITY
IN THE PAST 12 MONTHS, HAS LACK OF TRANSPORTATION KEPT YOU FROM MEETINGS, WORK, OR FROM GETTING THINGS NEEDED FOR DAILY LIVING?: NO

## 2024-04-08 SDOH — ECONOMIC STABILITY: FOOD INSECURITY: WITHIN THE PAST 12 MONTHS, YOU WORRIED THAT YOUR FOOD WOULD RUN OUT BEFORE YOU GOT MONEY TO BUY MORE.: NEVER TRUE

## 2024-04-08 SDOH — ECONOMIC STABILITY: FOOD INSECURITY: WITHIN THE PAST 12 MONTHS, THE FOOD YOU BOUGHT JUST DIDN'T LAST AND YOU DIDN'T HAVE MONEY TO GET MORE.: NEVER TRUE

## 2024-04-08 SDOH — ECONOMIC STABILITY: HOUSING INSECURITY
IN THE LAST 12 MONTHS, WAS THERE A TIME WHEN YOU DID NOT HAVE A STEADY PLACE TO SLEEP OR SLEPT IN A SHELTER (INCLUDING NOW)?: NO

## 2024-04-08 SDOH — ECONOMIC STABILITY: INCOME INSECURITY: HOW HARD IS IT FOR YOU TO PAY FOR THE VERY BASICS LIKE FOOD, HOUSING, MEDICAL CARE, AND HEATING?: NOT HARD AT ALL

## 2024-04-11 ENCOUNTER — OFFICE VISIT (OUTPATIENT)
Dept: INTERNAL MEDICINE CLINIC | Age: 74
End: 2024-04-11
Payer: MEDICARE

## 2024-04-11 VITALS
HEART RATE: 67 BPM | SYSTOLIC BLOOD PRESSURE: 136 MMHG | DIASTOLIC BLOOD PRESSURE: 82 MMHG | OXYGEN SATURATION: 97 % | BODY MASS INDEX: 30.3 KG/M2 | WEIGHT: 223.4 LBS

## 2024-04-11 DIAGNOSIS — E66.9 CLASS 1 OBESITY: ICD-10-CM

## 2024-04-11 DIAGNOSIS — N18.2 CKD (CHRONIC KIDNEY DISEASE) STAGE 2, GFR 60-89 ML/MIN: ICD-10-CM

## 2024-04-11 DIAGNOSIS — M16.11 ARTHRITIS OF RIGHT HIP: ICD-10-CM

## 2024-04-11 DIAGNOSIS — E78.2 MIXED HYPERLIPIDEMIA: ICD-10-CM

## 2024-04-11 DIAGNOSIS — I73.9 PAD (PERIPHERAL ARTERY DISEASE) (HCC): ICD-10-CM

## 2024-04-11 DIAGNOSIS — I10 PRIMARY HYPERTENSION: Primary | ICD-10-CM

## 2024-04-11 DIAGNOSIS — R73.02 IGT (IMPAIRED GLUCOSE TOLERANCE): ICD-10-CM

## 2024-04-11 PROCEDURE — 1123F ACP DISCUSS/DSCN MKR DOCD: CPT | Performed by: INTERNAL MEDICINE

## 2024-04-11 PROCEDURE — 99215 OFFICE O/P EST HI 40 MIN: CPT | Performed by: INTERNAL MEDICINE

## 2024-04-11 PROCEDURE — 3075F SYST BP GE 130 - 139MM HG: CPT | Performed by: INTERNAL MEDICINE

## 2024-04-11 PROCEDURE — 3079F DIAST BP 80-89 MM HG: CPT | Performed by: INTERNAL MEDICINE

## 2024-04-20 NOTE — PROGRESS NOTES
Subjective:      Patient ID: Shad Kate is a 73 y.o. male.    HPI He is here for a check up and f/u on hypertension, hyperlipidemia, elevated BMI, IGT.  He is taking medication as prescribed and continues to focus on a more balanced meal plan and more physical activity. He has achieved a 25 pound weight loss since 11/2022. Eliminated or greatly reduced red meat, sugar, potatoes. Eats fish, turkey, occasional rabbit, green vegetables.   PAD: see note from vascular surgeon.     Review of Systems   Constitutional:        Elevated BMI. 30.3. See HPI.   HENT: Negative.    Eyes: Negative.    Respiratory: Negative.    Cardiovascular:        HTN, on stable med regimen including Ca, and B blker, ARB, and diuretic.         PAD: for HPI.  Gastrointestinal: Negative.    Endocrine:        Hyperlipidemia, treated with statin. LDL 85. Lpa 73.         IGT: controlled with diet. A1c 5.7.   Genitourinary: Stage 2 CKD: B/Cr 20/1.24. GFR > 60.    Musculoskeletal: right hip pain. X rays show arthritis.    Skin: Negative.    Allergic/Immunologic:   Neurological: Negative.    Psychiatric/Behavioral: Negative.        Objective:   Physical Exam   Constitutional: He is oriented to person, place, and time. He appears well-developed and well-nourished. No distress.   HENT:   Head: Normocephalic and atraumatic.   Right Ear: External ear normal.   Left Ear: External ear normal.   Nose: Nose normal.   Mouth/Throat: Oropharynx is clear and moist.   Eyes: Pupils are equal, round, and reactive to light. Conjunctivae and EOM are normal. No scleral icterus.   Neck: Normal range of motion. Neck supple. No thyromegaly present.   Cardiovascular: Normal rate, regular rhythm, normal heart sounds and intact distal pulses.    Pulmonary/Chest: Effort normal and breath sounds normal.   Abdominal: Soft. Bowel sounds are normal. He exhibits no mass.   Lymphadenopathy:     He has no cervical adenopathy.   POPPY: left flexor surface radial aspect ganglion.

## 2024-04-22 NOTE — TELEPHONE ENCOUNTER
Medication:   Requested Prescriptions     Pending Prescriptions Disp Refills    hydroCHLOROthiazide (HYDRODIURIL) 25 MG tablet [Pharmacy Med Name: HYDROCHLOROTHIAZIDE 25MG TABLETS] 90 tablet 3     Sig: TAKE 1 TABLET BY MOUTH DAILY FOR BLOOD PRESSURE    metoprolol tartrate (LOPRESSOR) 50 MG tablet [Pharmacy Med Name: METOPROLOL TARTRATE 50MG TABLETS] 180 tablet 3     Sig: TAKE 1 TABLET BY MOUTH TWICE DAILY        Last Filled:  1/21/2024     Patient Phone Number: [phone] (home)     Last appt: 4/11/2024   Next appt: 7/18/2024    Last OARRS:        No data to display

## 2024-04-23 RX ORDER — METOPROLOL TARTRATE 50 MG/1
TABLET, FILM COATED ORAL
Qty: 180 TABLET | Refills: 3 | Status: SHIPPED | OUTPATIENT
Start: 2024-04-23

## 2024-04-23 RX ORDER — HYDROCHLOROTHIAZIDE 25 MG/1
TABLET ORAL
Qty: 90 TABLET | Refills: 3 | Status: SHIPPED | OUTPATIENT
Start: 2024-04-23

## 2024-06-25 RX ORDER — CANDESARTAN 32 MG/1
32 TABLET ORAL DAILY
Qty: 90 TABLET | Refills: 3 | Status: SHIPPED | OUTPATIENT
Start: 2024-06-25

## 2024-07-18 ENCOUNTER — OFFICE VISIT (OUTPATIENT)
Dept: INTERNAL MEDICINE CLINIC | Age: 74
End: 2024-07-18
Payer: MEDICARE

## 2024-07-18 VITALS
OXYGEN SATURATION: 95 % | HEART RATE: 61 BPM | HEIGHT: 72 IN | TEMPERATURE: 97.8 F | WEIGHT: 217 LBS | SYSTOLIC BLOOD PRESSURE: 112 MMHG | BODY MASS INDEX: 29.39 KG/M2 | DIASTOLIC BLOOD PRESSURE: 74 MMHG

## 2024-07-18 DIAGNOSIS — R73.02 IGT (IMPAIRED GLUCOSE TOLERANCE): Primary | ICD-10-CM

## 2024-07-18 DIAGNOSIS — N18.2 CKD (CHRONIC KIDNEY DISEASE) STAGE 2, GFR 60-89 ML/MIN: ICD-10-CM

## 2024-07-18 DIAGNOSIS — E66.3 OVERWEIGHT (BMI 25.0-29.9): ICD-10-CM

## 2024-07-18 DIAGNOSIS — I73.9 PAD (PERIPHERAL ARTERY DISEASE) (HCC): ICD-10-CM

## 2024-07-18 DIAGNOSIS — E78.2 MIXED HYPERLIPIDEMIA: ICD-10-CM

## 2024-07-18 DIAGNOSIS — I10 PRIMARY HYPERTENSION: ICD-10-CM

## 2024-07-18 DIAGNOSIS — M16.11 ARTHRITIS OF RIGHT HIP: ICD-10-CM

## 2024-07-18 LAB
CHP ED QC CHECK: NORMAL
GLUCOSE BLD-MCNC: 148 MG/DL
HBA1C MFR BLD: 6 %

## 2024-07-18 PROCEDURE — 1123F ACP DISCUSS/DSCN MKR DOCD: CPT | Performed by: INTERNAL MEDICINE

## 2024-07-18 PROCEDURE — 82962 GLUCOSE BLOOD TEST: CPT | Performed by: INTERNAL MEDICINE

## 2024-07-18 PROCEDURE — 3074F SYST BP LT 130 MM HG: CPT | Performed by: INTERNAL MEDICINE

## 2024-07-18 PROCEDURE — 83036 HEMOGLOBIN GLYCOSYLATED A1C: CPT | Performed by: INTERNAL MEDICINE

## 2024-07-18 PROCEDURE — G2211 COMPLEX E/M VISIT ADD ON: HCPCS | Performed by: INTERNAL MEDICINE

## 2024-07-18 PROCEDURE — 99215 OFFICE O/P EST HI 40 MIN: CPT | Performed by: INTERNAL MEDICINE

## 2024-07-18 PROCEDURE — 3078F DIAST BP <80 MM HG: CPT | Performed by: INTERNAL MEDICINE

## 2024-07-18 RX ORDER — ROSUVASTATIN CALCIUM 10 MG/1
10 TABLET, COATED ORAL NIGHTLY
Qty: 90 TABLET | Refills: 3 | Status: SHIPPED | OUTPATIENT
Start: 2024-07-18

## 2024-07-27 NOTE — PROGRESS NOTES
Subjective:      Patient ID: Shad Kate is a 73 y.o. male.    HPI He is here for a check up and f/u on hypertension, hyperlipidemia, elevated BMI, IGT.  He is taking medication as prescribed and continues to focus on a more balanced meal plan and more physical activity. He continues planned weight reduction. Eliminated or greatly reduced red meat, sugar, potatoes. Eats fish, turkey, occasional rabbit, green vegetables.   PAD: see note from vascular surgeon.     Review of Systems   Constitutional:        Elevated BMI. 29.4. See HPI.   HENT: Negative.    Eyes: Negative.    Respiratory: Negative.    Cardiovascular:        HTN, on stable med regimen including Ca, and B blker, ARB, and diuretic.         PAD: mild scattered atherosclerotic diease in the iliac arteries without significant luminal narrowing.   Gastrointestinal: Negative.    Endocrine:        Hyperlipidemia, treated with statin. LDL 85. Lpa 73.         IGT: controlled with diet. A1c 6.0.   Genitourinary: Stage 2 CKD: B/Cr 20/1.24. GFR > 60.    Musculoskeletal: occasional right hip pain with h/o arthritis.    Skin: Negative.    Allergic/Immunologic:   Neurological: Negative.    Psychiatric/Behavioral: Negative.        Objective:   Physical Exam   Constitutional: He is oriented to person, place, and time. He appears well-developed and well-nourished. No distress.   HENT:   Head: Normocephalic and atraumatic.   Right Ear: External ear normal.   Left Ear: External ear normal.   Nose: Nose normal.   Mouth/Throat: Oropharynx is clear and moist.   Eyes: Pupils are equal, round, and reactive to light. Conjunctivae and EOM are normal. No scleral icterus.   Neck: Normal range of motion. Neck supple. No thyromegaly present.   Cardiovascular: Normal rate, regular rhythm, normal heart sounds and intact distal pulses.    Pulmonary/Chest: Effort normal and breath sounds normal.   Abdominal: Soft. Bowel sounds are normal. He exhibits no mass.   Lymphadenopathy:

## 2024-10-13 SDOH — HEALTH STABILITY: PHYSICAL HEALTH: ON AVERAGE, HOW MANY MINUTES DO YOU ENGAGE IN EXERCISE AT THIS LEVEL?: 0 MIN

## 2024-10-13 SDOH — HEALTH STABILITY: PHYSICAL HEALTH: ON AVERAGE, HOW MANY DAYS PER WEEK DO YOU ENGAGE IN MODERATE TO STRENUOUS EXERCISE (LIKE A BRISK WALK)?: 3 DAYS

## 2024-10-13 ASSESSMENT — LIFESTYLE VARIABLES
HOW MANY STANDARD DRINKS CONTAINING ALCOHOL DO YOU HAVE ON A TYPICAL DAY: 1
HAVE YOU OR SOMEONE ELSE BEEN INJURED AS A RESULT OF YOUR DRINKING: NO
HOW OFTEN DURING THE LAST YEAR HAVE YOU FOUND THAT YOU WERE NOT ABLE TO STOP DRINKING ONCE YOU HAD STARTED: NEVER
HOW OFTEN DURING THE LAST YEAR HAVE YOU HAD A FEELING OF GUILT OR REMORSE AFTER DRINKING: NEVER
HAS A RELATIVE, FRIEND, DOCTOR, OR ANOTHER HEALTH PROFESSIONAL EXPRESSED CONCERN ABOUT YOUR DRINKING OR SUGGESTED YOU CUT DOWN: NO
HOW OFTEN DURING THE LAST YEAR HAVE YOU NEEDED AN ALCOHOLIC DRINK FIRST THING IN THE MORNING TO GET YOURSELF GOING AFTER A NIGHT OF HEAVY DRINKING: NEVER
HOW OFTEN DURING THE LAST YEAR HAVE YOU FOUND THAT YOU WERE NOT ABLE TO STOP DRINKING ONCE YOU HAD STARTED: NEVER
HOW OFTEN DURING THE LAST YEAR HAVE YOU BEEN UNABLE TO REMEMBER WHAT HAPPENED THE NIGHT BEFORE BECAUSE YOU HAD BEEN DRINKING: NEVER
HOW OFTEN DURING THE LAST YEAR HAVE YOU HAD A FEELING OF GUILT OR REMORSE AFTER DRINKING: NEVER
HOW OFTEN DO YOU HAVE A DRINK CONTAINING ALCOHOL: 5
HOW OFTEN DURING THE LAST YEAR HAVE YOU BEEN UNABLE TO REMEMBER WHAT HAPPENED THE NIGHT BEFORE BECAUSE YOU HAD BEEN DRINKING: NEVER
HOW MANY STANDARD DRINKS CONTAINING ALCOHOL DO YOU HAVE ON A TYPICAL DAY: 1 OR 2
HAS A RELATIVE, FRIEND, DOCTOR, OR ANOTHER HEALTH PROFESSIONAL EXPRESSED CONCERN ABOUT YOUR DRINKING OR SUGGESTED YOU CUT DOWN: NO
HAVE YOU OR SOMEONE ELSE BEEN INJURED AS A RESULT OF YOUR DRINKING: NO
HOW OFTEN DO YOU HAVE A DRINK CONTAINING ALCOHOL: 4 OR MORE TIMES A WEEK
HOW OFTEN DO YOU HAVE SIX OR MORE DRINKS ON ONE OCCASION: 1
HOW OFTEN DURING THE LAST YEAR HAVE YOU FAILED TO DO WHAT WAS NORMALLY EXPECTED FROM YOU BECAUSE OF DRINKING: NEVER
HOW OFTEN DURING THE LAST YEAR HAVE YOU FAILED TO DO WHAT WAS NORMALLY EXPECTED FROM YOU BECAUSE OF DRINKING: NEVER
HOW OFTEN DURING THE LAST YEAR HAVE YOU NEEDED AN ALCOHOLIC DRINK FIRST THING IN THE MORNING TO GET YOURSELF GOING AFTER A NIGHT OF HEAVY DRINKING: NEVER

## 2024-10-13 ASSESSMENT — PATIENT HEALTH QUESTIONNAIRE - PHQ9
SUM OF ALL RESPONSES TO PHQ QUESTIONS 1-9: 0
SUM OF ALL RESPONSES TO PHQ9 QUESTIONS 1 & 2: 0
2. FEELING DOWN, DEPRESSED OR HOPELESS: NOT AT ALL
1. LITTLE INTEREST OR PLEASURE IN DOING THINGS: NOT AT ALL
SUM OF ALL RESPONSES TO PHQ QUESTIONS 1-9: 0

## 2024-10-17 ENCOUNTER — OFFICE VISIT (OUTPATIENT)
Dept: INTERNAL MEDICINE CLINIC | Age: 74
End: 2024-10-17

## 2024-10-17 VITALS
BODY MASS INDEX: 29.97 KG/M2 | SYSTOLIC BLOOD PRESSURE: 119 MMHG | HEART RATE: 55 BPM | DIASTOLIC BLOOD PRESSURE: 80 MMHG | OXYGEN SATURATION: 94 % | WEIGHT: 221 LBS

## 2024-10-17 DIAGNOSIS — Z00.00 MEDICARE ANNUAL WELLNESS VISIT, SUBSEQUENT: Primary | ICD-10-CM

## 2024-10-17 DIAGNOSIS — R73.02 IGT (IMPAIRED GLUCOSE TOLERANCE): ICD-10-CM

## 2024-10-17 DIAGNOSIS — I10 PRIMARY HYPERTENSION: ICD-10-CM

## 2024-10-17 DIAGNOSIS — E78.41 ELEVATED LIPOPROTEIN(A): ICD-10-CM

## 2024-10-17 DIAGNOSIS — Z12.5 PROSTATE CANCER SCREENING: ICD-10-CM

## 2024-10-17 DIAGNOSIS — E78.2 MIXED HYPERLIPIDEMIA: ICD-10-CM

## 2024-10-17 LAB
CHP ED QC CHECK: NORMAL
GLUCOSE BLD-MCNC: 150 MG/DL
HBA1C MFR BLD: 5.8 %

## 2024-10-17 NOTE — PROGRESS NOTES
Medicare Annual Wellness Visit    Shad Kate is here for Medicare AWV, Hypertension, and Diabetes    Assessment & Plan    Diagnosis Orders   1. Medicare annual wellness visit, subsequent  Heart healthy diet discussed.       2. IGT (impaired glucose tolerance)  POCT Glucose    POCT glycosylated hemoglobin (Hb A1C)  Diet, physical activity and weight reduction discussed.       3. Elevated lipoprotein(a)  CT CARDIAC CALCIUM SCORING  Further risk stratification in patient with multiple cardiovascular risk factors.       4. Primary hypertension  Comprehensive Metabolic Panel    CBC with Auto Differential    MICROALBUMIN / CREATININE URINE RATIO  Continue same medication regimen.   DASH and low sodium diet discussed.       5. Mixed hyperlipidemia  LIPID PANEL  Heart healthy diet and statin compliance discussed.      6. Prostate cancer screening  PSA Screening          Recommendations for Preventive Services Due: see orders and patient instructions/AVS.  Recommended screening schedule for the next 5-10 years is provided to the patient in written form: see Patient Instructions/AVS.     No follow-ups on file.     Subjective       No falls.   Smokes cigars. 2 monthly.   Not as much activity. Plans to increase physical activity.   Covid and flu vaccines received from [employer]. RSV recommended.    HTN/HLD/IGT: taking medication as prescribed. Focusing on healthy lifestyle.     Patient's complete Health Risk Assessment and screening values have been reviewed and are found in Flowsheets. The following problems were reviewed today and where indicated follow up appointments were made and/or referrals ordered.    Positive Risk Factor Screenings with Interventions:                Inactivity:  On average, how many days per week do you engage in moderate to strenuous exercise (like a brisk walk)?: 3 days (!) Abnormal  On average, how many minutes do you engage in exercise at this level?: 0 min    Interventions:  Discussion. 
Yes

## 2024-10-18 DIAGNOSIS — Z12.5 PROSTATE CANCER SCREENING: ICD-10-CM

## 2024-10-18 DIAGNOSIS — E78.2 MIXED HYPERLIPIDEMIA: ICD-10-CM

## 2024-10-18 DIAGNOSIS — I10 PRIMARY HYPERTENSION: ICD-10-CM

## 2024-10-19 LAB
ALBUMIN SERPL-MCNC: 3.7 G/DL (ref 3.4–5)
ALBUMIN/GLOB SERPL: 1.3 {RATIO} (ref 1.1–2.2)
ALP SERPL-CCNC: 65 U/L (ref 40–129)
ALT SERPL-CCNC: <5 U/L (ref 10–40)
ANION GAP SERPL CALCULATED.3IONS-SCNC: 13 MMOL/L (ref 3–16)
AST SERPL-CCNC: 9 U/L (ref 15–37)
BASOPHILS # BLD: 0.1 K/UL (ref 0–0.2)
BASOPHILS NFR BLD: 1.5 %
BILIRUB SERPL-MCNC: 0.6 MG/DL (ref 0–1)
BUN SERPL-MCNC: 25 MG/DL (ref 7–20)
CALCIUM SERPL-MCNC: 9.4 MG/DL (ref 8.3–10.6)
CHLORIDE SERPL-SCNC: 100 MMOL/L (ref 99–110)
CHOLEST SERPL-MCNC: 134 MG/DL (ref 0–199)
CO2 SERPL-SCNC: 25 MMOL/L (ref 21–32)
CREAT SERPL-MCNC: 1.8 MG/DL (ref 0.8–1.3)
CREAT UR-MCNC: 207 MG/DL (ref 39–259)
DEPRECATED RDW RBC AUTO: 15.3 % (ref 12.4–15.4)
EOSINOPHIL # BLD: 0.1 K/UL (ref 0–0.6)
EOSINOPHIL NFR BLD: 2.5 %
GFR SERPLBLD CREATININE-BSD FMLA CKD-EPI: 39 ML/MIN/{1.73_M2}
GLUCOSE SERPL-MCNC: 131 MG/DL (ref 70–99)
HCT VFR BLD AUTO: 42.5 % (ref 40.5–52.5)
HDLC SERPL-MCNC: 46 MG/DL (ref 40–60)
HGB BLD-MCNC: 14 G/DL (ref 13.5–17.5)
LDLC SERPL CALC-MCNC: 69 MG/DL
LYMPHOCYTES # BLD: 1.4 K/UL (ref 1–5.1)
LYMPHOCYTES NFR BLD: 31.9 %
MCH RBC QN AUTO: 28.6 PG (ref 26–34)
MCHC RBC AUTO-ENTMCNC: 32.9 G/DL (ref 31–36)
MCV RBC AUTO: 87 FL (ref 80–100)
MICROALBUMIN UR DL<=1MG/L-MCNC: <1.2 MG/DL
MICROALBUMIN/CREAT UR: NORMAL MG/G (ref 0–30)
MONOCYTES # BLD: 0.5 K/UL (ref 0–1.3)
MONOCYTES NFR BLD: 10.4 %
NEUTROPHILS # BLD: 2.4 K/UL (ref 1.7–7.7)
NEUTROPHILS NFR BLD: 53.7 %
PLATELET # BLD AUTO: 238 K/UL (ref 135–450)
PMV BLD AUTO: 8.6 FL (ref 5–10.5)
POTASSIUM SERPL-SCNC: 4 MMOL/L (ref 3.5–5.1)
PROT SERPL-MCNC: 6.5 G/DL (ref 6.4–8.2)
PSA SERPL DL<=0.01 NG/ML-MCNC: 0.9 NG/ML (ref 0–4)
RBC # BLD AUTO: 4.89 M/UL (ref 4.2–5.9)
SODIUM SERPL-SCNC: 138 MMOL/L (ref 136–145)
TRIGL SERPL-MCNC: 96 MG/DL (ref 0–150)
VLDLC SERPL CALC-MCNC: 19 MG/DL
WBC # BLD AUTO: 4.4 K/UL (ref 4–11)

## 2024-10-24 ENCOUNTER — HOSPITAL ENCOUNTER (OUTPATIENT)
Dept: CT IMAGING | Age: 74
Discharge: HOME | End: 2024-10-24
Attending: INTERNAL MEDICINE
Payer: MEDICARE

## 2024-10-24 DIAGNOSIS — E78.41 ELEVATED LIPOPROTEIN(A): ICD-10-CM

## 2024-10-24 PROCEDURE — 75571 CT HRT W/O DYE W/CA TEST: CPT

## 2024-10-29 ENCOUNTER — HOSPITAL ENCOUNTER (OUTPATIENT)
Age: 74
Setting detail: OUTPATIENT SURGERY
Discharge: HOME | End: 2024-10-29
Attending: INTERNAL MEDICINE | Admitting: INTERNAL MEDICINE
Payer: MEDICARE

## 2024-10-29 ENCOUNTER — ANESTHESIA EVENT (OUTPATIENT)
Dept: ENDOSCOPY | Age: 74
End: 2024-10-29
Payer: MEDICARE

## 2024-10-29 ENCOUNTER — APPOINTMENT (OUTPATIENT)
Dept: ENDOSCOPY | Age: 74
End: 2024-10-29
Attending: INTERNAL MEDICINE
Payer: MEDICARE

## 2024-10-29 ENCOUNTER — ANESTHESIA (OUTPATIENT)
Dept: ENDOSCOPY | Age: 74
End: 2024-10-29
Payer: MEDICARE

## 2024-10-29 VITALS
WEIGHT: 228 LBS | RESPIRATION RATE: 18 BRPM | DIASTOLIC BLOOD PRESSURE: 75 MMHG | HEART RATE: 71 BPM | OXYGEN SATURATION: 99 % | BODY MASS INDEX: 30.88 KG/M2 | HEIGHT: 72 IN | TEMPERATURE: 96.9 F | SYSTOLIC BLOOD PRESSURE: 140 MMHG

## 2024-10-29 DIAGNOSIS — Z86.0100 HISTORY OF COLON POLYPS: ICD-10-CM

## 2024-10-29 PROCEDURE — 6360000002 HC RX W HCPCS: Performed by: NURSE ANESTHETIST, CERTIFIED REGISTERED

## 2024-10-29 PROCEDURE — 7100000010 HC PHASE II RECOVERY - FIRST 15 MIN: Performed by: INTERNAL MEDICINE

## 2024-10-29 PROCEDURE — 7100000011 HC PHASE II RECOVERY - ADDTL 15 MIN: Performed by: INTERNAL MEDICINE

## 2024-10-29 PROCEDURE — 2709999900 HC NON-CHARGEABLE SUPPLY: Performed by: INTERNAL MEDICINE

## 2024-10-29 PROCEDURE — 3609010600 HC COLONOSCOPY POLYPECTOMY SNARE/COLD BIOPSY: Performed by: INTERNAL MEDICINE

## 2024-10-29 PROCEDURE — 2580000003 HC RX 258: Performed by: NURSE ANESTHETIST, CERTIFIED REGISTERED

## 2024-10-29 PROCEDURE — 3700000000 HC ANESTHESIA ATTENDED CARE: Performed by: INTERNAL MEDICINE

## 2024-10-29 PROCEDURE — 3700000001 HC ADD 15 MINUTES (ANESTHESIA): Performed by: INTERNAL MEDICINE

## 2024-10-29 PROCEDURE — 88305 TISSUE EXAM BY PATHOLOGIST: CPT

## 2024-10-29 RX ORDER — SODIUM CHLORIDE 9 MG/ML
INJECTION, SOLUTION INTRAMUSCULAR; INTRAVENOUS; SUBCUTANEOUS
Status: DISCONTINUED | OUTPATIENT
Start: 2024-10-29 | End: 2024-10-29 | Stop reason: SDUPTHER

## 2024-10-29 RX ORDER — PROPOFOL 10 MG/ML
INJECTION, EMULSION INTRAVENOUS
Status: DISCONTINUED | OUTPATIENT
Start: 2024-10-29 | End: 2024-10-29 | Stop reason: SDUPTHER

## 2024-10-29 RX ORDER — LIDOCAINE HYDROCHLORIDE 20 MG/ML
INJECTION, SOLUTION INTRAVENOUS
Status: DISCONTINUED | OUTPATIENT
Start: 2024-10-29 | End: 2024-10-29 | Stop reason: SDUPTHER

## 2024-10-29 RX ORDER — GLYCOPYRROLATE 0.2 MG/ML
INJECTION INTRAMUSCULAR; INTRAVENOUS
Status: DISCONTINUED | OUTPATIENT
Start: 2024-10-29 | End: 2024-10-29 | Stop reason: SDUPTHER

## 2024-10-29 RX ADMIN — PROPOFOL 150 MCG/KG/MIN: 10 INJECTION, EMULSION INTRAVENOUS at 09:04

## 2024-10-29 RX ADMIN — SODIUM CHLORIDE 10 ML: 9 INJECTION, SOLUTION INTRAMUSCULAR; INTRAVENOUS; SUBCUTANEOUS at 09:16

## 2024-10-29 RX ADMIN — LIDOCAINE HYDROCHLORIDE 100 MG: 20 INJECTION, SOLUTION INTRAVENOUS at 09:04

## 2024-10-29 RX ADMIN — GLYCOPYRROLATE 0.2 MG: 0.2 INJECTION INTRAMUSCULAR; INTRAVENOUS at 09:10

## 2024-10-29 RX ADMIN — PHENYLEPHRINE HYDROCHLORIDE 100 MCG: 10 INJECTION, SOLUTION INTRAMUSCULAR; INTRAVENOUS; SUBCUTANEOUS at 09:19

## 2024-10-29 RX ADMIN — SODIUM CHLORIDE 15 ML: 9 INJECTION, SOLUTION INTRAMUSCULAR; INTRAVENOUS; SUBCUTANEOUS at 09:10

## 2024-10-29 RX ADMIN — SODIUM CHLORIDE 20 ML: 9 INJECTION, SOLUTION INTRAMUSCULAR; INTRAVENOUS; SUBCUTANEOUS at 09:13

## 2024-10-29 RX ADMIN — PROPOFOL 50 MG: 10 INJECTION, EMULSION INTRAVENOUS at 09:09

## 2024-10-29 RX ADMIN — SODIUM CHLORIDE 15 ML: 9 INJECTION, SOLUTION INTRAMUSCULAR; INTRAVENOUS; SUBCUTANEOUS at 09:06

## 2024-10-29 RX ADMIN — PROPOFOL 80 MG: 10 INJECTION, EMULSION INTRAVENOUS at 09:05

## 2024-10-29 RX ADMIN — PROPOFOL 30 MG: 10 INJECTION, EMULSION INTRAVENOUS at 09:14

## 2024-10-29 ASSESSMENT — PAIN SCALES - GENERAL: PAINLEVEL_OUTOF10: 0

## 2024-10-29 NOTE — PROCEDURES
[address] [address]                             PROCEDURE NOTE      PATIENT NAME: KULWANT PALENCIA              : 1950  MED REC NO: [id number]                      ROOM: Endo Pool  ACCOUNT NO: [id number]                       ADMIT DATE: 10/29/2024  PROVIDER: Gilberto West MD      DATE OF PROCEDURE:  10/29/2024    SURGEON:  Gilberto West MD    INDICATION FOR PROCEDURE:  History of polyps/screening.    DESCRIPTION OF PROCEDURE:  With the patient in the left lateral position and after IV Diprivan, the Olympus video colonoscope was inserted into the rectum and carefully advanced to cecum.  0.5 cm sessile polyp noted in the transverse colon.  We removed it with the biopsy forceps.  Careful inspection revealed no other abnormality.  The procedure was terminated without complication.    IMPRESSION:  Small transverse colon polyp - removed.    ESTIMATED BLOOD LOSS:  None.    Surveillance colonoscopy is recommended in 5 years.          GILBERTO WEST MD      D:  10/29/2024 09:34:05     T:  10/29/2024 15:01:45     DOYEL/HOLLAND  Job #:  [id number]     Doc#:  [id number]    CC:   MD Gilberto Chavarria MD

## 2024-10-29 NOTE — ANESTHESIA POSTPROCEDURE EVALUATION
Department of Anesthesiology  Postprocedure Note    Patient: Shad Kate  MRN: [id number]  YOB: 1950  Date of evaluation: 10/29/2024    Procedure Summary       Date: 10/29/24 Room / Location: [facility] ENDO  / [facility]    Anesthesia Start: 0856 Anesthesia Stop: 0926    Procedure: COLONOSCOPY POLYPECTOMY SNARE/BIOPSY Diagnosis:       History of colon polyps      (History of colon polyps [Z86.0100])    Surgeons: Gilberto West MD Responsible Provider: Dee Quintana MD    Anesthesia Type: MAC ASA Status: 3            Anesthesia Type: No value filed.    Jaskaran Phase I: Jaskaran Score: 10    Jaskaran Phase II:      Anesthesia Post Evaluation    Patient location during evaluation: PACU  Patient participation: complete - patient participated  Level of consciousness: awake  Pain score: 2  Airway patency: patent  Nausea & Vomiting: no nausea and no vomiting  Cardiovascular status: hemodynamically stable  Respiratory status: acceptable  Hydration status: euvolemic  Pain management: adequate    No notable events documented.

## 2024-10-29 NOTE — ANESTHESIA PRE PROCEDURE
Department of Anesthesiology  Preprocedure Note       Name:  Shad Kate   Age:  74 y.o.  :  1950                                          MRN:  [id number]         Date:  10/29/2024      Surgeon: Surgeon(s):  Gilberto West MD    Procedure: Procedure(s):  COLONOSCOPY    Medications prior to admission:   Prior to Admission medications    Medication Sig Start Date End Date Taking? Authorizing Provider   candesartan (ATACAND) 32 MG tablet Take 1 tablet by mouth daily 24  Yes Alex Little MD   hydroCHLOROthiazide (HYDRODIURIL) 25 MG tablet TAKE 1 TABLET BY MOUTH DAILY FOR BLOOD PRESSURE 24  Yes Alex Little MD   metoprolol tartrate (LOPRESSOR) 50 MG tablet TAKE 1 TABLET BY MOUTH TWICE DAILY 24  Yes Alex Little MD   amLODIPine-atorvastatatin (CADUET) 10-10 MG per tablet TAKE 1 TABLET BY MOUTH EVERY DAY 3/8/24  Yes Alex Little MD   potassium chloride (KLOR-CON M) 20 MEQ extended release tablet Take 1 tablet by mouth 2 times daily 23  Yes Alex Little MD   tadalafil (CIALIS) 20 MG tablet Take 1 tablet by mouth daily as needed for Erectile Dysfunction 23   Alex Little MD Handicap Placard MISC by Does not apply route Diagnosis: arthritis.     Expires: 8/3/25. 8/3/22   Alex Little MD   potassium chloride (KLOR-CON M) 20 MEQ TBCR extended release tablet Take 1 tablet by mouth 2 times daily    Provider, Historical, MD   Handicap Placard MISC by Does not apply route Diagnosis: arthritis.    Expires: 22. 20   Alex Little MD   aspirin 81 MG EC tablet Take 1 tablet by mouth every 48 hours as needed 5/27/10   ProviderDawn MD       Current medications:    No current facility-administered medications for this encounter.       Allergies:  No Known Allergies    Problem List:    Patient Active Problem List   Diagnosis Code    Hypertension I10    Dyslipidemia E78.5    Erectile dysfunction N52.9    Knee pain M25.569    Back pain M54.9    Right groin

## 2024-10-29 NOTE — DISCHARGE INSTRUCTIONS
ENDOSCOPY DISCHARGE INSTRUCTIONS:    Call the physician that did your procedure for any questions or concerns:           DR. BRITO:  [phone]               ACTIVITY:    There are potential side effects from the medications used for sedation and anesthesia during your procedure.  These include:  Dizziness or light-headedness, confusion or memory loss, delayed reaction times, loss of coordination, nausea and vomiting.  Because of your increased risk for injury, we ask that you observe the following precautions:  For the next 24 hours,  DO NOT operate an automobile, bicycle, motorcycle, , power tools or large equipment of any kind.  Do not drink alcohol, sign any legal documents or make any legal decisions for 24 hours.  Do not bend your head over lower than your heart.  DO sit on the side of bed/couch awhile before getting up.  Plan on bedrest or quiet relaxation today.  You may resume normal activities in 24 hours.    DIET:    Your first meal today should be light, avoiding spicy and fatty foods.  If you tolerate this first meal, then you may advance to your regular diet unless otherwise advised by your physician.    NORMAL SYMPTOMS:  -Mild sore throat if you’ve had an EGD   -Gaseous discomfort if you've had an EGD or Colonoscopy.     NOTIFY YOUR PHYSICIAN IF THESE SYMPTOMS OCCUR:  1. Fever (greater than 100)  5. Increased abdominal bloating  2. Severe pain    6. Excessive bleeding  3. Nausea and vomiting  7. Chest pain                                                                    4. Chills    8. Shortness of breath      ADDITIONAL INSTRUCTIONS:    Biopsy results: To be discussed at your follow-up visit.    Educational Information:    Follow up: Schedule an appointment with Dr. Brito for two weeks from now if you have not already done so.      Please review these discharge instructions this evening or tomorrow for  information you may have forgotten.            We want to thank you for choosing the

## 2024-12-07 DIAGNOSIS — E87.6 HYPOKALEMIA: ICD-10-CM

## 2024-12-09 NOTE — TELEPHONE ENCOUNTER
Medication:   Requested Prescriptions     Pending Prescriptions Disp Refills    potassium chloride (KLOR-CON M) 20 MEQ extended release tablet [Pharmacy Med Name: POTASSIUM CL 20MEQ ER TABLETS] 180 tablet 3     Sig: TAKE 1 TABLET BY MOUTH TWICE DAILY        Last Filled:      Patient Phone Number: [phone] (home)     Last appt: 10/17/2024   Next appt: 1/23/2025    Last OARRS:        No data to display

## 2024-12-10 DIAGNOSIS — E87.6 HYPOKALEMIA: ICD-10-CM

## 2024-12-10 RX ORDER — POTASSIUM CHLORIDE 1500 MG/1
20 TABLET, EXTENDED RELEASE ORAL 2 TIMES DAILY
Qty: 180 TABLET | Refills: 3 | OUTPATIENT
Start: 2024-12-10

## 2024-12-10 RX ORDER — POTASSIUM CHLORIDE 1500 MG/1
20 TABLET, EXTENDED RELEASE ORAL 2 TIMES DAILY
Qty: 180 TABLET | Refills: 3 | Status: SHIPPED | OUTPATIENT
Start: 2024-12-10

## 2024-12-10 NOTE — TELEPHONE ENCOUNTER
Medication:   Requested Prescriptions     Pending Prescriptions Disp Refills    potassium chloride (KLOR-CON M) 20 MEQ extended release tablet 180 tablet 3     Sig: Take 1 tablet by mouth 2 times daily        Last Filled:      Patient Phone Number: [phone] (home)     Last appt: 10/17/2024   Next appt: 1/23/2025    Last OARRS:        No data to display

## 2025-01-20 SDOH — ECONOMIC STABILITY: TRANSPORTATION INSECURITY
IN THE PAST 12 MONTHS, HAS THE LACK OF TRANSPORTATION KEPT YOU FROM MEDICAL APPOINTMENTS OR FROM GETTING MEDICATIONS?: NO

## 2025-01-20 SDOH — ECONOMIC STABILITY: FOOD INSECURITY: WITHIN THE PAST 12 MONTHS, YOU WORRIED THAT YOUR FOOD WOULD RUN OUT BEFORE YOU GOT MONEY TO BUY MORE.: NEVER TRUE

## 2025-01-20 SDOH — ECONOMIC STABILITY: INCOME INSECURITY: IN THE LAST 12 MONTHS, WAS THERE A TIME WHEN YOU WERE NOT ABLE TO PAY THE MORTGAGE OR RENT ON TIME?: NO

## 2025-01-20 SDOH — ECONOMIC STABILITY: FOOD INSECURITY: WITHIN THE PAST 12 MONTHS, THE FOOD YOU BOUGHT JUST DIDN'T LAST AND YOU DIDN'T HAVE MONEY TO GET MORE.: NEVER TRUE

## 2025-01-20 ASSESSMENT — PATIENT HEALTH QUESTIONNAIRE - PHQ9
1. LITTLE INTEREST OR PLEASURE IN DOING THINGS: NOT AT ALL
SUM OF ALL RESPONSES TO PHQ QUESTIONS 1-9: 0
SUM OF ALL RESPONSES TO PHQ9 QUESTIONS 1 & 2: 0
SUM OF ALL RESPONSES TO PHQ9 QUESTIONS 1 & 2: 0
2. FEELING DOWN, DEPRESSED OR HOPELESS: NOT AT ALL
2. FEELING DOWN, DEPRESSED OR HOPELESS: NOT AT ALL
1. LITTLE INTEREST OR PLEASURE IN DOING THINGS: NOT AT ALL

## 2025-01-23 ENCOUNTER — OFFICE VISIT (OUTPATIENT)
Dept: INTERNAL MEDICINE CLINIC | Age: 75
End: 2025-01-23

## 2025-01-23 VITALS
HEART RATE: 58 BPM | DIASTOLIC BLOOD PRESSURE: 82 MMHG | OXYGEN SATURATION: 97 % | WEIGHT: 221 LBS | BODY MASS INDEX: 29.97 KG/M2 | SYSTOLIC BLOOD PRESSURE: 122 MMHG

## 2025-01-23 DIAGNOSIS — I10 PRIMARY HYPERTENSION: ICD-10-CM

## 2025-01-23 DIAGNOSIS — E78.2 MIXED HYPERLIPIDEMIA: ICD-10-CM

## 2025-01-23 DIAGNOSIS — N18.32 STAGE 3B CHRONIC KIDNEY DISEASE (HCC): ICD-10-CM

## 2025-01-23 DIAGNOSIS — I73.9 PAD (PERIPHERAL ARTERY DISEASE) (HCC): ICD-10-CM

## 2025-01-23 DIAGNOSIS — I48.91 ATRIAL FIBRILLATION, NEW ONSET (HCC): ICD-10-CM

## 2025-01-23 DIAGNOSIS — R73.02 IGT (IMPAIRED GLUCOSE TOLERANCE): Primary | ICD-10-CM

## 2025-01-23 LAB
CHP ED QC CHECK: NORMAL
GLUCOSE BLD-MCNC: 140 MG/DL
HBA1C MFR BLD: 5.6 %

## 2025-01-23 NOTE — PROGRESS NOTES
Patient: Shad Kate is a 74 y.o. male who presents today with the following Chief Complaint(s):    Chief Complaint   Patient presents with    Follow-up    Diabetes         HPICr increase repeat. Still busy concern that kidneys affecting energy,   Sleep 7 hours.   Irregular rhythm. Check EKG.   Current Outpatient Medications   Medication Sig Dispense Refill    potassium chloride (KLOR-CON M) 20 MEQ extended release tablet Take 1 tablet by mouth 2 times daily 180 tablet 3    candesartan (ATACAND) 32 MG tablet Take 1 tablet by mouth daily 90 tablet 3    hydroCHLOROthiazide (HYDRODIURIL) 25 MG tablet TAKE 1 TABLET BY MOUTH DAILY FOR BLOOD PRESSURE 90 tablet 3    metoprolol tartrate (LOPRESSOR) 50 MG tablet TAKE 1 TABLET BY MOUTH TWICE DAILY 180 tablet 3    amLODIPine-atorvastatatin (CADUET) 10-10 MG per tablet TAKE 1 TABLET BY MOUTH EVERY DAY 90 tablet 3    tadalafil (CIALIS) 20 MG tablet Take 1 tablet by mouth daily as needed for Erectile Dysfunction 30 tablet 5    Handicap Placard MISC by Does not apply route Diagnosis: arthritis.     Expires: 8/3/25. 1 each 0    potassium chloride (KLOR-CON M) 20 MEQ TBCR extended release tablet Take 1 tablet by mouth 2 times daily      Handicap Placard MISC by Does not apply route Diagnosis: arthritis.    Expires: 1/23/22. 1 each 0    aspirin 81 MG EC tablet Take 1 tablet by mouth every 48 hours as needed       No current facility-administered medications for this visit.       Patient's past medical history, surgical history, family history, medications,and allergies  were all reviewed and updated as appropriate today.      Review of Systems      Physical Exam    Vitals:    01/23/25 0946   BP: 122/82   Pulse: 58   SpO2: 97%       Assessment:  Encounter Diagnosis   Name Primary?    IGT (impaired glucose tolerance) Yes       Plan:  1. IGT (impaired glucose tolerance)  ***  - POCT Glucose  - POCT glycosylated hemoglobin (Hb A1C)

## 2025-01-24 LAB
ANION GAP SERPL CALCULATED.3IONS-SCNC: 11 MMOL/L (ref 3–16)
BUN SERPL-MCNC: 24 MG/DL (ref 7–20)
CALCIUM SERPL-MCNC: 9.8 MG/DL (ref 8.3–10.6)
CHLORIDE SERPL-SCNC: 101 MMOL/L (ref 99–110)
CO2 SERPL-SCNC: 30 MMOL/L (ref 21–32)
CREAT SERPL-MCNC: 1.6 MG/DL (ref 0.8–1.3)
GFR SERPLBLD CREATININE-BSD FMLA CKD-EPI: 44 ML/MIN/{1.73_M2}
GLUCOSE SERPL-MCNC: 116 MG/DL (ref 70–99)
POTASSIUM SERPL-SCNC: 4 MMOL/L (ref 3.5–5.1)
SODIUM SERPL-SCNC: 142 MMOL/L (ref 136–145)

## 2025-01-27 ENCOUNTER — TELEPHONE (OUTPATIENT)
Dept: INTERNAL MEDICINE CLINIC | Age: 75
End: 2025-01-27

## 2025-01-27 DIAGNOSIS — I48.20 ATRIAL FIBRILLATION, CHRONIC (HCC): Primary | ICD-10-CM

## 2025-01-27 NOTE — TELEPHONE ENCOUNTER
Prescription for Eliquis cost 178 dollars for 30 days. Patient advised to call insurance to see how they can make medication affordable.   Samples given in the meantime. 2 week supply.

## 2025-01-30 ENCOUNTER — OFFICE VISIT (OUTPATIENT)
Dept: CARDIOLOGY CLINIC | Age: 75
End: 2025-01-30
Payer: MEDICARE

## 2025-01-30 ENCOUNTER — NURSE ONLY (OUTPATIENT)
Dept: CARDIOLOGY CLINIC | Age: 75
End: 2025-01-30

## 2025-01-30 VITALS
WEIGHT: 220.8 LBS | HEART RATE: 88 BPM | DIASTOLIC BLOOD PRESSURE: 98 MMHG | SYSTOLIC BLOOD PRESSURE: 126 MMHG | BODY MASS INDEX: 29.95 KG/M2

## 2025-01-30 DIAGNOSIS — I48.91 ATRIAL FIBRILLATION, UNSPECIFIED TYPE (HCC): Primary | ICD-10-CM

## 2025-01-30 DIAGNOSIS — I10 HYPERTENSION, UNSPECIFIED TYPE: ICD-10-CM

## 2025-01-30 DIAGNOSIS — I48.19 PERSISTENT ATRIAL FIBRILLATION (HCC): ICD-10-CM

## 2025-01-30 DIAGNOSIS — E78.5 DYSLIPIDEMIA: ICD-10-CM

## 2025-01-30 PROCEDURE — 4004F PT TOBACCO SCREEN RCVD TLK: CPT | Performed by: INTERNAL MEDICINE

## 2025-01-30 PROCEDURE — 1123F ACP DISCUSS/DSCN MKR DOCD: CPT | Performed by: INTERNAL MEDICINE

## 2025-01-30 PROCEDURE — 3074F SYST BP LT 130 MM HG: CPT | Performed by: INTERNAL MEDICINE

## 2025-01-30 PROCEDURE — G8417 CALC BMI ABV UP PARAM F/U: HCPCS | Performed by: INTERNAL MEDICINE

## 2025-01-30 PROCEDURE — 3080F DIAST BP >= 90 MM HG: CPT | Performed by: INTERNAL MEDICINE

## 2025-01-30 PROCEDURE — 99204 OFFICE O/P NEW MOD 45 MIN: CPT | Performed by: INTERNAL MEDICINE

## 2025-01-30 PROCEDURE — 1159F MED LIST DOCD IN RCRD: CPT | Performed by: INTERNAL MEDICINE

## 2025-01-30 PROCEDURE — 93000 ELECTROCARDIOGRAM COMPLETE: CPT | Performed by: INTERNAL MEDICINE

## 2025-01-30 PROCEDURE — G8427 DOCREV CUR MEDS BY ELIG CLIN: HCPCS | Performed by: INTERNAL MEDICINE

## 2025-01-30 NOTE — PATIENT INSTRUCTIONS
External Cardioversion    Date of Procedure:     Time of Arrival:    Cardiologist performing procedure: Dr. Dudley    Arrive at [facility] through the main entrance. Check in at the Outpatient Diagnostic desk on the 1st floor.    Do not eat or drink anything after midnight the night before the procedure.      You may brush your teeth and rinse the morning of the procedure.    Do NOT stop taking Eliquis (blood thinners).  It is very important to not miss any doses of Eliquis leading up to the cardioversion.  Please call our office if you accidentally miss a dose of your blood thinner.    Take all your other routine medications the morning of the procedure.    Do not put on any lotion, powder, or deodorant the morning of the procedure.    Please bring a list of your medications to the hospital with you.    You must have someone available to drive you home. It is recommended that you do not drive for 24 hours after the procedure. You will also need someone with you at home the night of the procedure.    If you are unable to make this appointment, please call [facility], at [phone].

## 2025-01-30 NOTE — PROGRESS NOTES
Monitor placed by MYRLA  Monitor company [employer]  Length of monitor 14  Monitor ordered by   Serial number/Patch ID [id number]  Kit ID (for VC)   Activation successful prior to pt leaving office? Yes

## 2025-01-30 NOTE — PROGRESS NOTES
[facility]   Cardiac Electrophysiology Consultation   Date: 1/30/2025  Reason for Consultation:   Consult Requesting Physician: No att. providers found     Chief Complaint:   Chief Complaint   Patient presents with    Atrial Fibrillation      HPI: Shad Kate is a 74 y.o. male referred by Dr. Little for new onset AF. PMH significant for HLD, HTN, CKD, minor CAD ([facility] 2004), followed Dr. Guzman in the past (last 2014). Pt was at PCP office for 3 mo f/u with c/o fatigue (1/2025), noted to have an irregular heartbeat, ECG showed AF CVR (last ECG in 2022 was sinus), placed on Eliquis.    Interval History:   Today, he is here for management of AF, presenting in AF CVR. He did have any specific symptoms when he saw his PCP earlier this month. He does complain of fatigue, mostly in the morning. He is active, able to do what he wants to do. Denies complaints of palpitations, dizziness, CP, SOB, orthopnea, BLE swelling, or syncope. He sleeps well (with the exception of frequent bathroom trips), but does admit to snoring. He drinks EtOH about 1-2 times a week. He occasionally smokes a cigar.     He started Eliquis 1 week ago.     Assessment:  Newly diagnosed AF, on Eliquis, metoprolol  HTN, stable on metoprolol, amlodipine, HCTZ  HLD, on statin  CKD    Plan:  Obtain 14 day CAM to assess AF burden  Obtain echo after monitor is complete  If AF is persistent, will plan for DCCV after he has been on uninterrupted Eliquis for 4 weeks  No changes in medications  Follow up with me after DCCV to assess symptomatology being in SR    Discussed in detail about the risk factors, pathophysiology, and treatment options including life stye modifications for atrial fibrillation.     Keep your blood pressure under control.   Keep your blood sugar under control.   Eat heart healthy diet  Minimize alcohol intake  Stop smoking  Be active, keep your body weight optimal  Exercise on a regular basis  Manage your stress   If you

## 2025-01-31 NOTE — PROGRESS NOTES
Subjective:      Patient ID: Shad Kate is a 74 y.o. male.    HPI He is here for a check up and f/u on hypertension, hyperlipidemia, elevated BMI, IGT.  He is taking medication as prescribed and continues to focus on a more balanced meal plan and more physical activity. He continues planned weight reduction. Eliminated or greatly reduced red meat, sugar, potatoes. Eats fish, turkey, occasional rabbit, green vegetables.   PAD: see note from vascular surgeon.   He complains of feeling fatigued and wonders if this nis associated with CKD. Endorses getting at least 7 hours sleep nightly. Denies exertional CP or SOB. Active in a number of civic/social activities and completes assignments without problem.     Review of Systems   Constitutional:        Elevated BMI. 29.9. See HPI. Complains of fatigue. See HPI.   HENT: Negative.    Eyes: Negative.    Respiratory: Negative.    Cardiovascular:        HTN, on stable med regimen including Ca, and B blker, ARB, and diuretic.         PAD: mild scattered atherosclerotic diease in the iliac arteries without significant luminal narrowing.   Gastrointestinal: Negative.    Endocrine:        Hyperlipidemia, treated with statin. LDL 69. Lpa 73.         IGT: controlled with diet. A1c 5.6.   Genitourinary: Stage 3b CKD: B/Cr 24/1.6. GFR 44..    Musculoskeletal: occasional right hip pain with h/o arthritis.    Skin: Negative.    Allergic/Immunologic:   Neurological: Negative.    Psychiatric/Behavioral: Negative.        Objective:   Physical Exam   Constitutional: He is oriented to person, place, and time. He appears well-developed and well-nourished. No distress.   HENT:   Head: Normocephalic and atraumatic.   Right Ear: External ear normal.   Left Ear: External ear normal.   Nose: Nose normal.   Mouth/Throat: Oropharynx is clear and moist.   Eyes: Pupils are equal, round, and reactive to light. Conjunctivae and EOM are normal. No scleral icterus.   Neck: Normal range of motion.

## 2025-02-01 ENCOUNTER — APPOINTMENT (OUTPATIENT)
Dept: GENERAL RADIOLOGY | Age: 75
End: 2025-02-01
Payer: MEDICARE

## 2025-02-01 ENCOUNTER — HOSPITAL ENCOUNTER (EMERGENCY)
Age: 75
Discharge: HOME | End: 2025-02-01
Payer: MEDICARE

## 2025-02-01 VITALS
HEART RATE: 88 BPM | RESPIRATION RATE: 18 BRPM | TEMPERATURE: 98 F | SYSTOLIC BLOOD PRESSURE: 146 MMHG | DIASTOLIC BLOOD PRESSURE: 89 MMHG | OXYGEN SATURATION: 97 %

## 2025-02-01 DIAGNOSIS — M25.532 LEFT WRIST PAIN: Primary | ICD-10-CM

## 2025-02-01 PROCEDURE — 99283 EMERGENCY DEPT VISIT LOW MDM: CPT

## 2025-02-01 PROCEDURE — 73110 X-RAY EXAM OF WRIST: CPT

## 2025-02-01 PROCEDURE — 73130 X-RAY EXAM OF HAND: CPT

## 2025-02-01 PROCEDURE — 6370000000 HC RX 637 (ALT 250 FOR IP): Performed by: NURSE PRACTITIONER

## 2025-02-01 RX ORDER — OXYCODONE AND ACETAMINOPHEN 5; 325 MG/1; MG/1
2 TABLET ORAL ONCE
Status: COMPLETED | OUTPATIENT
Start: 2025-02-01 | End: 2025-02-01

## 2025-02-01 RX ORDER — HYDROCODONE BITARTRATE AND ACETAMINOPHEN 5; 325 MG/1; MG/1
1 TABLET ORAL EVERY 6 HOURS PRN
Qty: 12 TABLET | Refills: 0 | Status: SHIPPED | OUTPATIENT
Start: 2025-02-01 | End: 2025-02-04

## 2025-02-01 RX ADMIN — OXYCODONE HYDROCHLORIDE AND ACETAMINOPHEN 2 TABLET: 5; 325 TABLET ORAL at 13:30

## 2025-02-01 NOTE — DISCHARGE INSTRUCTIONS
Do not take Tylenol or acetaminophen-containing products while taking Norco.  Sedation precautions while taking Norco.    Do not drink alcohol while taking Norco.    Do not drive or operate heavy machinery while taking Norco.

## 2025-02-01 NOTE — ED NOTES

## 2025-02-04 SDOH — HEALTH STABILITY: PHYSICAL HEALTH: ON AVERAGE, HOW MANY DAYS PER WEEK DO YOU ENGAGE IN MODERATE TO STRENUOUS EXERCISE (LIKE A BRISK WALK)?: 2 DAYS

## 2025-02-04 SDOH — HEALTH STABILITY: PHYSICAL HEALTH: ON AVERAGE, HOW MANY MINUTES DO YOU ENGAGE IN EXERCISE AT THIS LEVEL?: 40 MIN

## 2025-02-05 ENCOUNTER — TELEPHONE (OUTPATIENT)
Dept: INTERNAL MEDICINE CLINIC | Age: 75
End: 2025-02-05

## 2025-02-05 NOTE — TELEPHONE ENCOUNTER
Daughter came in to request a referral for dad to see Dr. Diana Negron at . This is for arthritis and patient was recently at ER Saturday due to this. The fax number provided is  [phone] .

## 2025-02-07 ENCOUNTER — OFFICE VISIT (OUTPATIENT)
Dept: ORTHOPEDIC SURGERY | Age: 75
End: 2025-02-07
Payer: MEDICARE

## 2025-02-07 VITALS — RESPIRATION RATE: 14 BRPM | WEIGHT: 220 LBS | HEIGHT: 72 IN | BODY MASS INDEX: 29.8 KG/M2

## 2025-02-07 DIAGNOSIS — M19.132 SLAC (SCAPHOLUNATE ADVANCED COLLAPSE) OF WRIST, LEFT: Primary | ICD-10-CM

## 2025-02-07 PROCEDURE — 1159F MED LIST DOCD IN RCRD: CPT | Performed by: PHYSICIAN ASSISTANT

## 2025-02-07 PROCEDURE — G8427 DOCREV CUR MEDS BY ELIG CLIN: HCPCS | Performed by: PHYSICIAN ASSISTANT

## 2025-02-07 PROCEDURE — 4004F PT TOBACCO SCREEN RCVD TLK: CPT | Performed by: PHYSICIAN ASSISTANT

## 2025-02-07 PROCEDURE — 1125F AMNT PAIN NOTED PAIN PRSNT: CPT | Performed by: PHYSICIAN ASSISTANT

## 2025-02-07 PROCEDURE — G8417 CALC BMI ABV UP PARAM F/U: HCPCS | Performed by: PHYSICIAN ASSISTANT

## 2025-02-07 PROCEDURE — 99213 OFFICE O/P EST LOW 20 MIN: CPT | Performed by: PHYSICIAN ASSISTANT

## 2025-02-07 PROCEDURE — 1123F ACP DISCUSS/DSCN MKR DOCD: CPT | Performed by: PHYSICIAN ASSISTANT

## 2025-02-07 NOTE — ED PROVIDER NOTES
[facility] EMERGENCY DEPARTMENT  [address] [address]  Dept: [phone]  Loc: [phone]    EMERGENCY DEPARTMENT ENCOUNTER        This patient was not seen or evaluated by the attending physician.    I evaluated this patient, the attending physician was available for consultation.    CHIEF COMPLAINT    Chief Complaint   Patient presents with    Wrist Pain     Left wrist pain starting yesterday with some swelling        HPI    Shad Kate is a 74 y.o. male who presents to the emergency department with a complaint of left wrist pain and swelling that he noticed after waking up yesterday.  He denies injury.  He has never had this before.  He denies history of gout.  He denies elbow pain.  He denies numbness or paresthesias to the extremity.  He denies bodyaches, fever, chills    REVIEW OF SYSTEMS    General: No fever or chills  Cardiac: No chest pain  Pulmonary: No shortness of breath  Musculoskeletal: see HPI  All other systems reviewed and are negative.    PAST MEDICAL & SURGICAL HISTORY    Past Medical History:   Diagnosis Date    Arthritis     knees, R hip    Back pain     Dyslipidemia     ED (erectile dysfunction)     HTN (hypertension)     Obesities     Osteoarthritis 1995    Vitamin D deficiency      Past Surgical History:   Procedure Laterality Date    CARPAL TUNNEL RELEASE Right 10/11/2022    RIGHT CARPAL TUNNEL RELEASE performed by Thanh Omalley MD at [facility] OR    CARPAL TUNNEL RELEASE Left 11/01/2022    LEFT CARPAL TUNNEL RELEASE performed by Thanh Omalley MD at [facility] OR    COLONOSCOPY  09/03/2021    COLONOSCOPY WITH BIOPSY performed by Gilberot West MD at [facility] ENDOSCOPY    COLONOSCOPY N/A 10/29/2024    COLONOSCOPY POLYPECTOMY SNARE/BIOPSY performed by Gilberto West MD at [facility] ENDOSCOPY    CYSTOSCOPY      EYE SURGERY  2000 and 2021    laser surgery    HERNIA REPAIR Right 2007    [facility]      KNEE SURGERY      [place]

## 2025-02-07 NOTE — PROGRESS NOTES
Mr. Shad Kate is a 74 y.o. left handed man  who is seen today in Hand Surgical Consultation at the request of Alex Little MD.    He is seen today regarding a multi year history of left wrist pain, worsening in the last few weeks without previous  history of injury.  He was not seen for this concern by his primary care physician; previous treatment has included splinting of the left wrist.  He reports mild pain located in the Dorsal wrist, no tenderness of the remaining hand or elbow.  He  notes today, no neurologic symptoms in the hand. Symptoms are improving from where it was a week ago.        I have today reviewed with Shad Kate the clinically relevant, past medical history, medications, allergies,  family history, social history, and Review Of Systems & I have documented any details relevant to today's presenting complaints in my history above.  Mr. Shad Kate's self-reported past medical history, medications, allergies,  family history, social history, and Review Of Systems have been scanned into the chart under the \"Media\" tab.    Physical Exam:  Mr. Shad Kate's most recent vitals:  Vitals  Respirations: 14  Height: 182.9 cm (6')  Weight - Scale: 99.8 kg (220 lb)    He is well nourished, oriented to person, place & time.  He demonstrates appropriate mood and affect as well as normal gait and station.    Skin: Normal in appearance, Normal Color, and Free of Lesions Bilaterally   Digital range of motion is Full bilaterally  Wrist range of motion is limited by Osteoarthritis bilaterally  Sensation is subjectively normal in the Whole Hand.  All other digits are normally sensate bilaterally  Vascular examination reveals normal, good capillary refill, and good color bilaterally. There is no ecchymosis.  Swelling is mild in the dorso-radial wrist bilaterally.  No other digit or hand bilaterally shows sign of swelling.  There is no evidence of gross joint instability bilaterally.  Muscular

## 2025-02-13 ENCOUNTER — HOSPITAL ENCOUNTER (OUTPATIENT)
Age: 75
Discharge: HOME | End: 2025-02-15
Attending: INTERNAL MEDICINE
Payer: MEDICARE

## 2025-02-13 VITALS
WEIGHT: 217 LBS | DIASTOLIC BLOOD PRESSURE: 87 MMHG | HEIGHT: 72 IN | SYSTOLIC BLOOD PRESSURE: 128 MMHG | BODY MASS INDEX: 29.39 KG/M2

## 2025-02-13 DIAGNOSIS — I48.91 ATRIAL FIBRILLATION, UNSPECIFIED TYPE (HCC): ICD-10-CM

## 2025-02-13 DIAGNOSIS — I48.19 PERSISTENT ATRIAL FIBRILLATION (HCC): ICD-10-CM

## 2025-02-13 LAB
ECHO AO ASC DIAM: 3.5 CM
ECHO AO ASCENDING AORTA INDEX: 1.58 CM/M2
ECHO AO ROOT DIAM: 3.5 CM
ECHO AO ROOT INDEX: 1.58 CM/M2
ECHO AV AREA PEAK VELOCITY: 3.8 CM2
ECHO AV AREA VTI: 3.7 CM2
ECHO AV AREA/BSA PEAK VELOCITY: 1.7 CM2/M2
ECHO AV AREA/BSA VTI: 1.7 CM2/M2
ECHO AV MEAN GRADIENT: 3 MMHG
ECHO AV MEAN VELOCITY: 0.8 M/S
ECHO AV PEAK GRADIENT: 6 MMHG
ECHO AV PEAK VELOCITY: 1.2 M/S
ECHO AV VELOCITY RATIO: 0.92
ECHO AV VTI: 27.9 CM
ECHO BSA: 2.24 M2
ECHO LA AREA 2C: 24.1 CM2
ECHO LA AREA 4C: 30.1 CM2
ECHO LA DIAMETER INDEX: 2.08 CM/M2
ECHO LA DIAMETER: 4.6 CM
ECHO LA MAJOR AXIS: 7.2 CM
ECHO LA MINOR AXIS: 6.8 CM
ECHO LA TO AORTIC ROOT RATIO: 1.31
ECHO LA VOL BP: 87 ML (ref 18–58)
ECHO LA VOL MOD A2C: 71 ML (ref 18–58)
ECHO LA VOL MOD A4C: 100 ML (ref 18–58)
ECHO LA VOL/BSA BIPLANE: 39 ML/M2 (ref 16–34)
ECHO LA VOLUME INDEX MOD A2C: 32 ML/M2 (ref 16–34)
ECHO LA VOLUME INDEX MOD A4C: 45 ML/M2 (ref 16–34)
ECHO LV E' LATERAL VELOCITY: 10 CM/S
ECHO LV E' SEPTAL VELOCITY: 4.67 CM/S
ECHO LV EDV A2C: 82 ML
ECHO LV EDV A4C: 127 ML
ECHO LV EDV INDEX A4C: 57 ML/M2
ECHO LV EDV NDEX A2C: 37 ML/M2
ECHO LV EJECTION FRACTION A2C: 51 %
ECHO LV EJECTION FRACTION A4C: 63 %
ECHO LV EJECTION FRACTION BIPLANE: 58 % (ref 55–100)
ECHO LV ESV A2C: 40 ML
ECHO LV ESV A4C: 48 ML
ECHO LV ESV INDEX A2C: 18 ML/M2
ECHO LV ESV INDEX A4C: 22 ML/M2
ECHO LV FRACTIONAL SHORTENING: 33 % (ref 28–44)
ECHO LV INTERNAL DIMENSION DIASTOLE INDEX: 2.22 CM/M2
ECHO LV INTERNAL DIMENSION DIASTOLIC: 4.9 CM (ref 4.2–5.9)
ECHO LV INTERNAL DIMENSION SYSTOLIC INDEX: 1.49 CM/M2
ECHO LV INTERNAL DIMENSION SYSTOLIC: 3.3 CM
ECHO LV IVSD: 1.2 CM (ref 0.6–1)
ECHO LV MASS 2D: 176 G (ref 88–224)
ECHO LV MASS INDEX 2D: 79.7 G/M2 (ref 49–115)
ECHO LV POSTERIOR WALL DIASTOLIC: 0.8 CM (ref 0.6–1)
ECHO LV RELATIVE WALL THICKNESS RATIO: 0.33
ECHO LVOT AREA: 4.2 CM2
ECHO LVOT AV VTI INDEX: 0.9
ECHO LVOT DIAM: 2.3 CM
ECHO LVOT MEAN GRADIENT: 2 MMHG
ECHO LVOT PEAK GRADIENT: 5 MMHG
ECHO LVOT PEAK VELOCITY: 1.1 M/S
ECHO LVOT STROKE VOLUME INDEX: 47 ML/M2
ECHO LVOT SV: 103.8 ML
ECHO LVOT VTI: 25 CM
ECHO MV A VELOCITY: 0.56 M/S
ECHO MV AREA VTI: 5.4 CM2
ECHO MV E DECELERATION TIME (DT): 180 MS
ECHO MV E VELOCITY: 0.62 M/S
ECHO MV E/A RATIO: 1.11
ECHO MV E/E' LATERAL: 6.2
ECHO MV E/E' RATIO (AVERAGED): 9.74
ECHO MV E/E' SEPTAL: 13.28
ECHO MV LVOT VTI INDEX: 0.78
ECHO MV MAX VELOCITY: 0.7 M/S
ECHO MV MEAN GRADIENT: 1 MMHG
ECHO MV MEAN VELOCITY: 0.3 M/S
ECHO MV PEAK GRADIENT: 2 MMHG
ECHO MV VTI: 19.4 CM
ECHO PV ACCELERATION TIME (AT): 153 MS
ECHO PV MAX VELOCITY: 0.8 M/S
ECHO PV PEAK GRADIENT: 2 MMHG
ECHO RA AREA 4C: 19 CM2
ECHO RA END SYSTOLIC VOLUME APICAL 4 CHAMBER INDEX BSA: 24 ML/M2
ECHO RA VOLUME: 54 ML
ECHO RV BASAL DIMENSION: 3.9 CM
ECHO RV FREE WALL PEAK S': 12.3 CM/S
ECHO RV MID DIMENSION: 2.7 CM
ECHO RV TAPSE: 3.1 CM (ref 1.7–?)
ECHO TV REGURGITANT MAX VELOCITY: 2.32 M/S
ECHO TV REGURGITANT PEAK GRADIENT: 22 MMHG

## 2025-02-13 PROCEDURE — 93306 TTE W/DOPPLER COMPLETE: CPT

## 2025-02-18 DIAGNOSIS — M16.10 ARTHRITIS OF HIP: Primary | ICD-10-CM

## 2025-03-27 NOTE — TELEPHONE ENCOUNTER
Medication:   Requested Prescriptions     Pending Prescriptions Disp Refills    amLODIPine-atorvastatatin (CADUET) 10-10 MG per tablet [Pharmacy Med Name: AMLODIPINE-ATORVASTATIN 10/10MG TAB] 90 tablet 3     Sig: TAKE 1 TABLET BY MOUTH EVERY DAY        Last Filled:      Patient Phone Number: [phone] (home)     Last appt: 1/23/2025   Next appt: 4/24/2025    Last OARRS:        No data to display

## 2025-03-28 RX ORDER — AMLODIPINE BESYLATE AND ATORVASTATIN CALCIUM 10; 10 MG/1; MG/1
1 TABLET, FILM COATED ORAL DAILY
Qty: 90 TABLET | Refills: 3 | Status: SHIPPED | OUTPATIENT
Start: 2025-03-28

## 2025-04-02 NOTE — PROGRESS NOTES
ROS: Per HPI.   Gastrointestinal ROS: negative for - abdominal pain, blood in stools, diarrhea, hematemesis, melena, nausea/vomiting or swallowing difficulty/pain  Genito-Urinary ROS: negative for - dysuria or incontinence  Musculoskeletal ROS: negative for - joint swelling or muscle pain  Neurological ROS: negative for - confusion, dizziness, gait disturbance, headaches, numbness/tingling, seizures, speech problems, tremors, visual changes or weakness  Dermatological ROS: negative for - rash    Physical Examination:  Vitals:    04/15/25 1024   BP: 132/84   Pulse: 66         Constitutional: Oriented. No distress.   Head: Normocephalic and atraumatic.   Mouth/Throat: Oropharynx is clear and moist.   Eyes: Conjunctivae normal. EOM are normal.   Neck: Normal range of motion. Neck supple. No rigidity.  No JVD present.   Cardiovascular: Normal rate, regular rhythm, S1&S2 and intact distal pulses.   Pulmonary/Chest: Bilateral respiratory sounds. No wheezes. No rhonchi.    Abdominal: Soft. Bowel sounds present. No distension, No tenderness.   Musculoskeletal: No tenderness. No edema    Lymphadenopathy: Has no cervical adenopathy.   Neurological: Alert and oriented. Cranial nerve appears intact, No Gross deficit   Skin: Skin is warm and dry. No rash noted.   Psychiatric: Has a normal mood, affect and behavior     Labs:  Reviewed.     ECG: reviewed, AF, V rate 88     Studies:   1. 14 day CAM (1/30-2/13/25):   - Predominant rhythm: NSR  - Atrial Fibrillation (AF) 12.5%  - Atrial Flutter (AFL) 0.2 %  - Atrial Tachycardia (AT) 850 episodes, Longest 13 beats @ Avg 120 bpm up to 142 bpm, Fastest 4 beats @ Avg 125 bpm up to 155 bpm  - PAC 7 %  - PVC 0.8 %   No sxs reported     2. Echo 2/13/25:     Left Ventricle: Normal left ventricular systolic function with a visually estimated EF of 60 - 65%. Left ventricle size is normal. Normal wall thickness. Normal wall motion. Normal diastolic function.    Right Ventricle: Normal systolic

## 2025-04-10 NOTE — TELEPHONE ENCOUNTER
Medication:   Requested Prescriptions     Pending Prescriptions Disp Refills    hydroCHLOROthiazide (HYDRODIURIL) 25 MG tablet [Pharmacy Med Name: HYDROCHLOROTHIAZIDE 25MG TABLETS] 90 tablet 3     Sig: TAKE 1 TABLET BY MOUTH DAILY FOR BLOOD PRESSURE    metoprolol tartrate (LOPRESSOR) 50 MG tablet [Pharmacy Med Name: METOPROLOL TARTRATE 50MG TABLETS] 180 tablet 3     Sig: TAKE 1 TABLET BY MOUTH TWICE DAILY        Last Filled:      Patient Phone Number: [phone] (home)     Last appt: 1/23/2025   Next appt: 4/24/2025    Last OARRS:        No data to display

## 2025-04-12 RX ORDER — HYDROCHLOROTHIAZIDE 25 MG/1
25 TABLET ORAL DAILY
Qty: 90 TABLET | Refills: 3 | Status: SHIPPED | OUTPATIENT
Start: 2025-04-12

## 2025-04-12 RX ORDER — METOPROLOL TARTRATE 50 MG
50 TABLET ORAL 2 TIMES DAILY
Qty: 180 TABLET | Refills: 3 | Status: SHIPPED | OUTPATIENT
Start: 2025-04-12

## 2025-04-14 RX ORDER — METOPROLOL TARTRATE 50 MG
50 TABLET ORAL 2 TIMES DAILY
Qty: 180 TABLET | Refills: 3 | Status: SHIPPED | OUTPATIENT
Start: 2025-04-14 | End: 2025-04-15 | Stop reason: CLARIF

## 2025-04-14 RX ORDER — HYDROCHLOROTHIAZIDE 25 MG/1
25 TABLET ORAL DAILY
Qty: 90 TABLET | Refills: 3 | Status: SHIPPED | OUTPATIENT
Start: 2025-04-14 | End: 2025-04-15 | Stop reason: CLARIF

## 2025-04-14 NOTE — TELEPHONE ENCOUNTER
Medication:   Requested Prescriptions     Pending Prescriptions Disp Refills    hydroCHLOROthiazide (HYDRODIURIL) 25 MG tablet 90 tablet 3     Sig: Take 1 tablet by mouth daily        Last Filled:      Patient Phone Number: [phone] (home)     Last appt: 1/23/2025   Next appt: 4/11/2025    Last OARRS:        No data to display

## 2025-04-14 NOTE — TELEPHONE ENCOUNTER
Medication:   Requested Prescriptions     Pending Prescriptions Disp Refills    metoprolol tartrate (LOPRESSOR) 50 MG tablet 180 tablet 3     Sig: Take 1 tablet by mouth 2 times daily        Last Filled:      Patient Phone Number: [phone] (home)     Last appt: 1/23/2025   Next appt: 4/24/2025    Last OARRS:        No data to display

## 2025-04-15 ENCOUNTER — OFFICE VISIT (OUTPATIENT)
Dept: CARDIOLOGY CLINIC | Age: 75
End: 2025-04-15
Payer: MEDICARE

## 2025-04-15 VITALS
WEIGHT: 220.2 LBS | BODY MASS INDEX: 29.86 KG/M2 | SYSTOLIC BLOOD PRESSURE: 132 MMHG | HEART RATE: 66 BPM | DIASTOLIC BLOOD PRESSURE: 84 MMHG

## 2025-04-15 DIAGNOSIS — I10 HYPERTENSION, UNSPECIFIED TYPE: ICD-10-CM

## 2025-04-15 DIAGNOSIS — I48.0 PAF (PAROXYSMAL ATRIAL FIBRILLATION) (HCC): Primary | ICD-10-CM

## 2025-04-15 DIAGNOSIS — E78.5 DYSLIPIDEMIA: ICD-10-CM

## 2025-04-15 DIAGNOSIS — I48.3 TYPICAL ATRIAL FLUTTER (HCC): ICD-10-CM

## 2025-04-15 PROCEDURE — G2211 COMPLEX E/M VISIT ADD ON: HCPCS | Performed by: INTERNAL MEDICINE

## 2025-04-15 PROCEDURE — 93000 ELECTROCARDIOGRAM COMPLETE: CPT | Performed by: INTERNAL MEDICINE

## 2025-04-15 PROCEDURE — 3075F SYST BP GE 130 - 139MM HG: CPT | Performed by: INTERNAL MEDICINE

## 2025-04-15 PROCEDURE — 1036F TOBACCO NON-USER: CPT | Performed by: INTERNAL MEDICINE

## 2025-04-15 PROCEDURE — 1123F ACP DISCUSS/DSCN MKR DOCD: CPT | Performed by: INTERNAL MEDICINE

## 2025-04-15 PROCEDURE — 99214 OFFICE O/P EST MOD 30 MIN: CPT | Performed by: INTERNAL MEDICINE

## 2025-04-15 PROCEDURE — G8427 DOCREV CUR MEDS BY ELIG CLIN: HCPCS | Performed by: INTERNAL MEDICINE

## 2025-04-15 PROCEDURE — 1159F MED LIST DOCD IN RCRD: CPT | Performed by: INTERNAL MEDICINE

## 2025-04-15 PROCEDURE — G8417 CALC BMI ABV UP PARAM F/U: HCPCS | Performed by: INTERNAL MEDICINE

## 2025-04-15 PROCEDURE — 3079F DIAST BP 80-89 MM HG: CPT | Performed by: INTERNAL MEDICINE

## 2025-04-15 RX ORDER — FLECAINIDE ACETATE 100 MG/1
100 TABLET ORAL 2 TIMES DAILY
Qty: 60 TABLET | Refills: 5 | Status: SHIPPED | OUTPATIENT
Start: 2025-04-15

## 2025-04-15 RX ORDER — HYDROXYCHLOROQUINE SULFATE 200 MG/1
200 TABLET, FILM COATED ORAL 2 TIMES DAILY
COMMUNITY
Start: 2025-04-10

## 2025-04-15 NOTE — PATIENT INSTRUCTIONS
flecainide  Pronunciation:  ANGELES kelsea rikki  Brand:  Tambocor  What is the most important information I should know about flecainide?  You should not use this medicine if you have a serious heart condition such as bundle branch block or AV block (without a pacemaker), or if your heart cannot pump blood properly.  You may receive your first dose in a hospital or clinic setting to quickly treat any serious side effects.  What is flecainide?  Flecainide is a Class IC anti-arrhythmic that is used in certain situations to prevent serious heart rhythm disorders.  Flecainide may also be used for purposes not listed in this medication guide.  What should I discuss with my healthcare provider before taking flecainide?  You should not use flecainide if you are allergic to it, or if:  you have a serious heart condition such as bundle branch block or AV block (unless you have a pacemaker); or  your heart cannot pump blood properly.  Tell your doctor if you have ever had:  a heart attack;  chronic atrial fibrillation, or \"AFib\";  congestive heart failure;  a heart condition called \"sick sinus syndrome\";  an electrolyte imbalance (such as low levels of potassium or magnesium in your blood); or  liver disease.  It is not known whether this medicine will harm an unborn baby. Tell your doctor if you are pregnant or plan to become pregnant.  It may not be safe to breast-feed while using this medicine. Ask your doctor about any risk.  How should I take flecainide?  Follow all directions on your prescription label and read all medication guides or instruction sheets. Your doctor may occasionally change your dose. Use the medicine exactly as directed.  You may receive your first few doses in a hospital or clinic setting to quickly treat any serious side effects.  Your heart rate will be monitored using an electrocardiograph or ECG (sometimes called an EKG). This will help your doctor determine how long to treat you with flecainide.  You

## 2025-04-22 NOTE — TELEPHONE ENCOUNTER
Medication:   Requested Prescriptions     Pending Prescriptions Disp Refills    amLODIPine-atorvastatatin (CADUET) 10-10 MG per tablet 90 tablet 3     Sig: Take 1 tablet by mouth daily        Last Filled:      Patient Phone Number: [phone] (home)     Last appt: 1/23/2025   Next appt: 6/4/2025    Last OARRS:        No data to display

## 2025-04-23 ENCOUNTER — CLINICAL SUPPORT (OUTPATIENT)
Dept: CARDIOLOGY CLINIC | Age: 75
End: 2025-04-23
Payer: MEDICARE

## 2025-04-23 DIAGNOSIS — I48.0 PAF (PAROXYSMAL ATRIAL FIBRILLATION) (HCC): Primary | ICD-10-CM

## 2025-04-23 PROCEDURE — 93000 ELECTROCARDIOGRAM COMPLETE: CPT | Performed by: INTERNAL MEDICINE

## 2025-04-23 RX ORDER — AMLODIPINE BESYLATE AND ATORVASTATIN CALCIUM 10; 10 MG/1; MG/1
1 TABLET, FILM COATED ORAL DAILY
Qty: 90 TABLET | Refills: 3 | Status: SHIPPED | OUTPATIENT
Start: 2025-04-23

## 2025-06-16 RX ORDER — CANDESARTAN 32 MG/1
32 TABLET ORAL DAILY
Qty: 90 TABLET | Refills: 3 | Status: SHIPPED | OUTPATIENT
Start: 2025-06-16

## 2025-06-16 NOTE — TELEPHONE ENCOUNTER
Medication:   Requested Prescriptions     Pending Prescriptions Disp Refills    candesartan (ATACAND) 32 MG tablet [Pharmacy Med Name: CANDESARTAN 32MG TABLETS] 90 tablet 3     Sig: TAKE 1 TABLET BY MOUTH DAILY        Last Filled:      Patient Phone Number: [phone] (home)     Last appt: 1/23/2025   Next appt: 7/22/2025    Last OARRS:        No data to display

## 2025-07-03 NOTE — H&P
History and Physical / Pre-Sedation Assessment    Patient:  Shad Kate   :   1950     Intended Procedure:  colon    HPI: h/o polyp    Past Medical History:   has a past medical history of Arthritis, Back pain, Dyslipidemia, ED (erectile dysfunction), HTN (hypertension), Obesities, Osteoarthritis, and Vitamin D deficiency.     Past Surgical History:   has a past surgical history that includes knee surgery; Vasectomy; Cystoscopy; hernia repair (Right, ); pr laps surg rpr recurrent inguinal hernia (N/A, 10/09/2018); Colonoscopy (2021); Carpal tunnel release (Right, 10/11/2022); Carpal tunnel release (Left, 2022); eye surgery ( and ); and Tonsillectomy ().     Medications:  Prior to Admission medications    Medication Sig Start Date End Date Taking? Authorizing Provider   candesartan (ATACAND) 32 MG tablet Take 1 tablet by mouth daily 24  Yes Alex Little MD   hydroCHLOROthiazide (HYDRODIURIL) 25 MG tablet TAKE 1 TABLET BY MOUTH DAILY FOR BLOOD PRESSURE 24  Yes Alex Little MD   metoprolol tartrate (LOPRESSOR) 50 MG tablet TAKE 1 TABLET BY MOUTH TWICE DAILY 24  Yes Alex Little MD   amLODIPine-atorvastatatin (CADUET) 10-10 MG per tablet TAKE 1 TABLET BY MOUTH EVERY DAY 3/8/24  Yes Alex Little MD   potassium chloride (KLOR-CON M) 20 MEQ extended release tablet Take 1 tablet by mouth 2 times daily 23  Yes Alex Little MD   tadalafil (CIALIS) 20 MG tablet Take 1 tablet by mouth daily as needed for Erectile Dysfunction 23   Alex Little MD Handicap Placard MISC by Does not apply route Diagnosis: arthritis.     Expires: 8/3/25. 8/3/22   Alex Little MD   potassium chloride (KLOR-CON M) 20 MEQ TBCR extended release tablet Take 1 tablet by mouth 2 times daily    Provider, Historical, MD   Handicap Placard MISC by Does not apply route Diagnosis: arthritis.    Expires: 22. 20   Alex Little MD   aspirin 81 MG EC tablet Take 1  Pt returned phone call.

## 2025-07-22 ENCOUNTER — OFFICE VISIT (OUTPATIENT)
Dept: INTERNAL MEDICINE CLINIC | Age: 75
End: 2025-07-22
Payer: MEDICARE

## 2025-07-22 VITALS
HEART RATE: 49 BPM | WEIGHT: 217 LBS | OXYGEN SATURATION: 96 % | SYSTOLIC BLOOD PRESSURE: 132 MMHG | BODY MASS INDEX: 29.43 KG/M2 | DIASTOLIC BLOOD PRESSURE: 86 MMHG

## 2025-07-22 DIAGNOSIS — I73.9 PAD (PERIPHERAL ARTERY DISEASE): ICD-10-CM

## 2025-07-22 DIAGNOSIS — I10 PRIMARY HYPERTENSION: ICD-10-CM

## 2025-07-22 DIAGNOSIS — E78.2 MIXED HYPERLIPIDEMIA: ICD-10-CM

## 2025-07-22 DIAGNOSIS — I48.0 PAF (PAROXYSMAL ATRIAL FIBRILLATION) (HCC): ICD-10-CM

## 2025-07-22 DIAGNOSIS — Z12.5 PROSTATE CANCER SCREENING: ICD-10-CM

## 2025-07-22 DIAGNOSIS — N18.31 STAGE 3A CHRONIC KIDNEY DISEASE (CKD) (HCC): ICD-10-CM

## 2025-07-22 DIAGNOSIS — E66.3 OVERWEIGHT (BMI 25.0-29.9): ICD-10-CM

## 2025-07-22 DIAGNOSIS — R73.02 IGT (IMPAIRED GLUCOSE TOLERANCE): Primary | ICD-10-CM

## 2025-07-22 LAB
BASOPHILS # BLD: 0 K/UL (ref 0–0.2)
BASOPHILS NFR BLD: 0.7 %
CHOLEST SERPL-MCNC: 115 MG/DL (ref 0–199)
CHP ED QC CHECK: NORMAL
CREAT UR-MCNC: 156 MG/DL (ref 39–259)
DEPRECATED RDW RBC AUTO: 14.9 % (ref 12.4–15.4)
EOSINOPHIL # BLD: 0.1 K/UL (ref 0–0.6)
EOSINOPHIL NFR BLD: 2.5 %
GLUCOSE BLD-MCNC: 83 MG/DL
HBA1C MFR BLD: 5.4 %
HCT VFR BLD AUTO: 39.2 % (ref 40.5–52.5)
HDLC SERPL-MCNC: 45 MG/DL (ref 40–60)
HGB BLD-MCNC: 13.4 G/DL (ref 13.5–17.5)
LDLC SERPL CALC-MCNC: 56 MG/DL
LYMPHOCYTES # BLD: 0.8 K/UL (ref 1–5.1)
LYMPHOCYTES NFR BLD: 16.8 %
MCH RBC QN AUTO: 28.3 PG (ref 26–34)
MCHC RBC AUTO-ENTMCNC: 34.1 G/DL (ref 31–36)
MCV RBC AUTO: 82.8 FL (ref 80–100)
MICROALBUMIN UR DL<=1MG/L-MCNC: <1.2 MG/DL
MICROALBUMIN/CREAT UR: NORMAL MG/G (ref 0–30)
MONOCYTES # BLD: 0.4 K/UL (ref 0–1.3)
MONOCYTES NFR BLD: 9.3 %
NEUTROPHILS # BLD: 3.2 K/UL (ref 1.7–7.7)
NEUTROPHILS NFR BLD: 70.7 %
PLATELET # BLD AUTO: 240 K/UL (ref 135–450)
PMV BLD AUTO: 8 FL (ref 5–10.5)
PSA SERPL DL<=0.01 NG/ML-MCNC: 0.74 NG/ML (ref 0–4)
RBC # BLD AUTO: 4.74 M/UL (ref 4.2–5.9)
TRIGL SERPL-MCNC: 69 MG/DL (ref 0–150)
TSH SERPL DL<=0.005 MIU/L-ACNC: 2.37 UIU/ML (ref 0.27–4.2)
VLDLC SERPL CALC-MCNC: 14 MG/DL
WBC # BLD AUTO: 4.5 K/UL (ref 4–11)

## 2025-07-22 PROCEDURE — 1123F ACP DISCUSS/DSCN MKR DOCD: CPT | Performed by: INTERNAL MEDICINE

## 2025-07-22 PROCEDURE — G8427 DOCREV CUR MEDS BY ELIG CLIN: HCPCS | Performed by: INTERNAL MEDICINE

## 2025-07-22 PROCEDURE — 83036 HEMOGLOBIN GLYCOSYLATED A1C: CPT | Performed by: INTERNAL MEDICINE

## 2025-07-22 PROCEDURE — 1159F MED LIST DOCD IN RCRD: CPT | Performed by: INTERNAL MEDICINE

## 2025-07-22 PROCEDURE — G8417 CALC BMI ABV UP PARAM F/U: HCPCS | Performed by: INTERNAL MEDICINE

## 2025-07-22 PROCEDURE — 1036F TOBACCO NON-USER: CPT | Performed by: INTERNAL MEDICINE

## 2025-07-22 PROCEDURE — G2211 COMPLEX E/M VISIT ADD ON: HCPCS | Performed by: INTERNAL MEDICINE

## 2025-07-22 PROCEDURE — 99214 OFFICE O/P EST MOD 30 MIN: CPT | Performed by: INTERNAL MEDICINE

## 2025-07-22 PROCEDURE — 3075F SYST BP GE 130 - 139MM HG: CPT | Performed by: INTERNAL MEDICINE

## 2025-07-22 PROCEDURE — 3079F DIAST BP 80-89 MM HG: CPT | Performed by: INTERNAL MEDICINE

## 2025-07-22 PROCEDURE — 82962 GLUCOSE BLOOD TEST: CPT | Performed by: INTERNAL MEDICINE

## 2025-07-23 LAB
ALBUMIN SERPL-MCNC: 4.1 G/DL (ref 3.4–5)
ALBUMIN/GLOB SERPL: 1.4 {RATIO} (ref 1.1–2.2)
ALP SERPL-CCNC: 59 U/L (ref 40–129)
ALT SERPL-CCNC: 17 U/L (ref 10–40)
ANION GAP SERPL CALCULATED.3IONS-SCNC: 15 MMOL/L (ref 3–16)
AST SERPL-CCNC: 30 U/L (ref 15–37)
BILIRUB SERPL-MCNC: 0.6 MG/DL (ref 0–1)
BUN SERPL-MCNC: 21 MG/DL (ref 7–20)
CALCIUM SERPL-MCNC: 9.6 MG/DL (ref 8.3–10.6)
CHLORIDE SERPL-SCNC: 99 MMOL/L (ref 99–110)
CO2 SERPL-SCNC: 27 MMOL/L (ref 21–32)
CREAT SERPL-MCNC: 1.3 MG/DL (ref 0.8–1.3)
GFR SERPLBLD CREATININE-BSD FMLA CKD-EPI: 57 ML/MIN/{1.73_M2}
GLUCOSE SERPL-MCNC: 75 MG/DL (ref 70–99)
POTASSIUM SERPL-SCNC: 3.6 MMOL/L (ref 3.5–5.1)
PROT SERPL-MCNC: 7.1 G/DL (ref 6.4–8.2)
SODIUM SERPL-SCNC: 141 MMOL/L (ref 136–145)

## 2025-08-05 DIAGNOSIS — I48.91 ATRIAL FIBRILLATION, NEW ONSET (HCC): ICD-10-CM

## 2025-08-05 RX ORDER — APIXABAN 5 MG/1
5 TABLET, FILM COATED ORAL 2 TIMES DAILY
Qty: 60 TABLET | Refills: 5 | Status: SHIPPED | OUTPATIENT
Start: 2025-08-05

## 2025-08-25 ENCOUNTER — TELEPHONE (OUTPATIENT)
Dept: INTERNAL MEDICINE CLINIC | Age: 75
End: 2025-08-25

## (undated) DEVICE — GLOVE SURG SZ 8 L12IN FNGR THK94MIL STD WHT LTX FREE

## (undated) DEVICE — DRAPE,LAP,CHOLE,W/TROUGHS,STERILE: Brand: MEDLINE

## (undated) DEVICE — SOLUTION IV IRRIG 500ML 0.9% SODIUM CHL 2F7123

## (undated) DEVICE — Device

## (undated) DEVICE — INTENDED FOR TISSUE SEPARATION, AND OTHER PROCEDURES THAT REQUIRE A SHARP SURGICAL BLADE TO PUNCTURE OR CUT.: Brand: BARD-PARKER ® CARBON RIB-BACK BLADES

## (undated) DEVICE — GLOVE ORANGE PI 7 1/2   MSG9075

## (undated) DEVICE — COVER,MAYO STAND,XL,STERILE: Brand: MEDLINE

## (undated) DEVICE — GOWN,SIRUS,POLYRNF,SETINSLV,XL,20/CS: Brand: MEDLINE

## (undated) DEVICE — CANNULA SAMP CO2 AD GRN 7FT CO2 AND 7FT O2 TBNG UNIV CONN

## (undated) DEVICE — PACK,BASIC: Brand: MEDLINE

## (undated) DEVICE — ZIMMER® STERILE DISPOSABLE TOURNIQUET CUFF WITH PLC, DUAL PORT, SINGLE BLADDER, 18 IN. (46 CM)

## (undated) DEVICE — GAUZE,SPONGE,4"X4",16PLY,XRAY,STRL,LF: Brand: MEDLINE

## (undated) DEVICE — GLOVE SURG SZ 7 L12IN FNGR THK75MIL WHT LTX POLYMER BEAD

## (undated) DEVICE — NEEDLE HYPO 25GA L1.5IN BLU POLYPR HUB S STL REG BVL STR

## (undated) DEVICE — BANDAGE COMPR W2INXL5YD TAN BRTH SELF ADH WRP W/ HND TEAR

## (undated) DEVICE — SKIN AFFIX SURG ADHESIVE 72/CS 0.55ML: Brand: MEDLINE

## (undated) DEVICE — GARMENT,MEDLINE,DVT,INT,CALF,MED, GEN2: Brand: MEDLINE

## (undated) DEVICE — SYRINGE MED 10ML TRNSLUC BRL PLUNG BLK MRK POLYPR CTRL

## (undated) DEVICE — SUTURE MCRYL SZ 4-0 L18IN ABSRB UD L19MM PS-2 3/8 CIR PRIM Y496G

## (undated) DEVICE — APPLICATOR PREP 26ML 0.7% IOD POVACRYLEX 74% ISO ALC ST

## (undated) DEVICE — TURNOVER KIT RM INF CTRL TECH

## (undated) DEVICE — FORCEPS BX L240CM WRK CHN 2.8MM STD CAP W/ NDL MIC MESH

## (undated) DEVICE — TROCAR: Brand: KII SHIELDED BLADED ACCESS SYSTEM

## (undated) DEVICE — GOWN,SIRUS,NON REINFRCD,LARGE,SET IN SL: Brand: MEDLINE

## (undated) DEVICE — SYRINGE MED 10ML LUERLOCK TIP W/O SFTY DISP

## (undated) DEVICE — SUTURE VCRL SZ 0 L27IN ABSRB UD L26MM CT-2 1/2 CIR J270H

## (undated) DEVICE — [HIGH FLOW INSUFFLATOR,  DO NOT USE IF PACKAGE IS DAMAGED,  KEEP DRY,  KEEP AWAY FROM SUNLIGHT,  PROTECT FROM HEAT AND RADIOACTIVE SOURCES.]: Brand: PNEUMOSURE

## (undated) DEVICE — UNDERGLOVE SURG SZ 8 FNGR THK0.21MIL GRN LTX BEAD CUF

## (undated) DEVICE — FORCEPS BX L240CM JAW DIA2.4MM ORNG L CAP W/ NDL DISP RAD

## (undated) DEVICE — ELECTRODE PT RET AD L9FT HI MOIST COND ADH HYDRGEL CORDED

## (undated) DEVICE — SUTURE CHROMIC GUT SZ 4-0 L18IN ABSRB BRN L19MM PS-2 3/8 1637G

## (undated) DEVICE — KIT,ANTI FOG,W/SPONGE & FLUID,SOFT PACK: Brand: MEDLINE

## (undated) DEVICE — BANDAGE COMPR W4INXL12FT E DISP ESMARCH EVEN

## (undated) DEVICE — NEEDLE HYPO 18GA L1.5IN THN WALL PIVOTING SHLD BVL ORIENTED

## (undated) DEVICE — SURGICAL SET UP - SURE SET: Brand: MEDLINE INDUSTRIES, INC.

## (undated) DEVICE — CORD ES L12FT BPLR FRCP

## (undated) DEVICE — STANDARD HYPODERMIC NEEDLE,POLYPROPYLENE HUB: Brand: MONOJECT

## (undated) DEVICE — COVER LT HNDL BLU PLAS

## (undated) DEVICE — SURE SET-DOUBLE BASIN-LF: Brand: MEDLINE INDUSTRIES, INC.

## (undated) DEVICE — CORD ES L10FT MPLR LAP

## (undated) DEVICE — GOWN,SIRUS,POLYRNF,SETINSLV,L,20/CS: Brand: MEDLINE

## (undated) DEVICE — GLOVE ORANGE PI 7   MSG9070

## (undated) DEVICE — SYSTEM PERM FIX L37CM 15 FAST CAPSUR

## (undated) DEVICE — TROCARS: Brand: KII® BALLOON BLUNT TIP SYSTEM